# Patient Record
Sex: FEMALE | Race: WHITE | HISPANIC OR LATINO | Employment: FULL TIME | URBAN - METROPOLITAN AREA
[De-identification: names, ages, dates, MRNs, and addresses within clinical notes are randomized per-mention and may not be internally consistent; named-entity substitution may affect disease eponyms.]

---

## 2017-06-07 ENCOUNTER — TELEPHONE (OUTPATIENT)
Dept: FAMILY MEDICINE | Facility: CLINIC | Age: 62
End: 2017-06-07

## 2017-06-07 DIAGNOSIS — Z00.00 GENERAL MEDICAL EXAM: Primary | ICD-10-CM

## 2017-06-10 ENCOUNTER — LAB VISIT (OUTPATIENT)
Dept: LAB | Facility: HOSPITAL | Age: 62
End: 2017-06-10
Attending: FAMILY MEDICINE
Payer: COMMERCIAL

## 2017-06-10 DIAGNOSIS — Z00.00 GENERAL MEDICAL EXAM: ICD-10-CM

## 2017-06-10 LAB
ALBUMIN SERPL BCP-MCNC: 3.8 G/DL
ALP SERPL-CCNC: 108 U/L
ALT SERPL W/O P-5'-P-CCNC: 23 U/L
ANION GAP SERPL CALC-SCNC: 11 MMOL/L
AST SERPL-CCNC: 17 U/L
BASOPHILS # BLD AUTO: 0.02 K/UL
BASOPHILS NFR BLD: 0.3 %
BILIRUB SERPL-MCNC: 0.6 MG/DL
BUN SERPL-MCNC: 14 MG/DL
CALCIUM SERPL-MCNC: 9.9 MG/DL
CHLORIDE SERPL-SCNC: 103 MMOL/L
CHOLEST/HDLC SERPL: 5.7 {RATIO}
CO2 SERPL-SCNC: 26 MMOL/L
CREAT SERPL-MCNC: 0.7 MG/DL
DIFFERENTIAL METHOD: ABNORMAL
EOSINOPHIL # BLD AUTO: 0.1 K/UL
EOSINOPHIL NFR BLD: 0.9 %
ERYTHROCYTE [DISTWIDTH] IN BLOOD BY AUTOMATED COUNT: 14.1 %
EST. GFR  (AFRICAN AMERICAN): >60 ML/MIN/1.73 M^2
EST. GFR  (NON AFRICAN AMERICAN): >60 ML/MIN/1.73 M^2
GLUCOSE SERPL-MCNC: 99 MG/DL
HCT VFR BLD AUTO: 45.2 %
HDL/CHOLESTEROL RATIO: 17.5 %
HDLC SERPL-MCNC: 240 MG/DL
HDLC SERPL-MCNC: 42 MG/DL
HGB BLD-MCNC: 14.2 G/DL
LDLC SERPL CALC-MCNC: 150.6 MG/DL
LYMPHOCYTES # BLD AUTO: 2.7 K/UL
LYMPHOCYTES NFR BLD: 36.1 %
MCH RBC QN AUTO: 28 PG
MCHC RBC AUTO-ENTMCNC: 31.4 %
MCV RBC AUTO: 89 FL
MONOCYTES # BLD AUTO: 0.5 K/UL
MONOCYTES NFR BLD: 6.1 %
NEUTROPHILS # BLD AUTO: 4.2 K/UL
NEUTROPHILS NFR BLD: 56.3 %
NONHDLC SERPL-MCNC: 198 MG/DL
PLATELET # BLD AUTO: 195 K/UL
PMV BLD AUTO: 12.2 FL
POTASSIUM SERPL-SCNC: 4.4 MMOL/L
PROT SERPL-MCNC: 7.4 G/DL
RBC # BLD AUTO: 5.08 M/UL
SODIUM SERPL-SCNC: 140 MMOL/L
TRIGL SERPL-MCNC: 237 MG/DL
TSH SERPL DL<=0.005 MIU/L-ACNC: 2.65 UIU/ML
WBC # BLD AUTO: 7.51 K/UL

## 2017-06-10 PROCEDURE — 36415 COLL VENOUS BLD VENIPUNCTURE: CPT | Mod: PO

## 2017-06-10 PROCEDURE — 83036 HEMOGLOBIN GLYCOSYLATED A1C: CPT

## 2017-06-10 PROCEDURE — 85025 COMPLETE CBC W/AUTO DIFF WBC: CPT

## 2017-06-10 PROCEDURE — 80061 LIPID PANEL: CPT

## 2017-06-10 PROCEDURE — 84443 ASSAY THYROID STIM HORMONE: CPT

## 2017-06-10 PROCEDURE — 80053 COMPREHEN METABOLIC PANEL: CPT

## 2017-06-11 LAB
ESTIMATED AVG GLUCOSE: 126 MG/DL
HBA1C MFR BLD HPLC: 6 %

## 2017-06-12 ENCOUNTER — OFFICE VISIT (OUTPATIENT)
Dept: FAMILY MEDICINE | Facility: CLINIC | Age: 62
End: 2017-06-12
Payer: COMMERCIAL

## 2017-06-12 VITALS
SYSTOLIC BLOOD PRESSURE: 140 MMHG | WEIGHT: 216.5 LBS | HEIGHT: 63 IN | TEMPERATURE: 98 F | OXYGEN SATURATION: 97 % | DIASTOLIC BLOOD PRESSURE: 80 MMHG | HEART RATE: 81 BPM | BODY MASS INDEX: 38.36 KG/M2

## 2017-06-12 DIAGNOSIS — E78.5 HYPERLIPIDEMIA, UNSPECIFIED HYPERLIPIDEMIA TYPE: ICD-10-CM

## 2017-06-12 DIAGNOSIS — E66.01 SEVERE OBESITY (BMI 35.0-35.9 WITH COMORBIDITY): ICD-10-CM

## 2017-06-12 DIAGNOSIS — Z00.00 ANNUAL PHYSICAL EXAM: Primary | ICD-10-CM

## 2017-06-12 DIAGNOSIS — I10 ESSENTIAL HYPERTENSION: ICD-10-CM

## 2017-06-12 PROCEDURE — 99396 PREV VISIT EST AGE 40-64: CPT | Mod: S$GLB,,, | Performed by: FAMILY MEDICINE

## 2017-06-12 PROCEDURE — 99999 PR PBB SHADOW E&M-EST. PATIENT-LVL IV: CPT | Mod: PBBFAC,,, | Performed by: FAMILY MEDICINE

## 2017-06-12 NOTE — PATIENT INSTRUCTIONS
Aerobic Exercise for a Healthy Heart  Exercise is a lot more than an energy booster and a stress reliever. It also strengthens your heart muscle, lowers your blood pressure and cholesterol, and burns calories. It can also improve your resting muscle tone, and your mood.     Remember, some activity is better than none.    Choose an aerobic activity  Choose an activity that makes your heart and lungs work harder than they do when you rest or walk normally. This aerobic exercise can improve the way your heart and other muscles use oxygen. Make it fun by exercising with a friend and choosing an activity you enjoy. Here are some ideas:  · Walking  · Swimming  · Bicycling  · Stair climbing  · Dancing  · Jogging  · Gardening  Exercise regularly  If you havent been exercising regularly,  get your doctors OK first. Then start slowly.  Here are some tips:  · Begin exercising 3 times a week for 5 to 10 minutes at a time.  · When you feel comfortable, add a few minutes each session.  · Slowly build up to exercising 3 to 4 times each week. Each session should last for 40 minutes, on average, and involve moderate- to vigorous-intensity physical activity.  · If you have been given nitroglycerin, be sure to carry it when you exercise.  · If you get chest pain (angina) when youre exercising, stop what youre doing, take your nitroglycerin, and call your doctor.  Date Last Reviewed: 6/2/2016  © 9646-0075 [employer]. [address] [place] [address], [place] [address]. All rights reserved. This information is not intended as a substitute for professional medical care. Always follow your healthcare professional's instructions.

## 2017-06-12 NOTE — PROGRESS NOTES
Routine Office Visit    Patient Name: Keyona Herrera    : 1955  MRN: [id number]    Subjective:  Keyona is a 61 y.o. female who presents today for     1. Annual exam / establish care / new to me - pt states that she has been working on her diet since her health physical. Her cholesterol level has been high. She has been trying to exercise.     Review of Systems   Constitutional: Negative for chills and fever.   HENT: Negative for congestion.    Eyes: Negative for blurred vision.   Respiratory: Negative for cough.    Cardiovascular: Negative for chest pain.   Gastrointestinal: Negative for abdominal pain, constipation, diarrhea, heartburn, nausea and vomiting.   Genitourinary: Negative for dysuria.   Musculoskeletal: Negative for myalgias.   Skin: Negative for itching and rash.   Neurological: Negative for dizziness and headaches.   Psychiatric/Behavioral: Negative for depression.       Active Problem List  Patient Active Problem List   Diagnosis    Hyperlipidemia    Obesity    Dizziness       Past Surgical History  Past Surgical History:   Procedure Laterality Date     SECTION      CHOLECYSTECTOMY      COLONOSCOPY N/A 2016    Procedure: COLONOSCOPY;  Surgeon: Edy Chanel MD;  [facility];  Service: Endoscopy;  Laterality: N/A;    HYSTERECTOMY      INCISIONAL BREAST BIOPSY      surgery on foot         Family History  Family History   Problem Relation Age of Onset    Dementia Mother     Aneurysm Father     Hypertension      Cancer Maternal Grandfather        Social History  Social History     Social History    Marital status:      Spouse name: N/A    Number of children: N/A    Years of education: N/A     Occupational History    Not on file.     Social History Main Topics    Smoking status: Never Smoker    Smokeless tobacco: Never Used    Alcohol use Yes      Comment: socail drinker/ not often    Drug use: No    Sexual activity: Yes     Partners: Male  "    Other Topics Concern    Not on file     Social History Narrative    No narrative on file       Medications and Allergies  Reviewed and updated.   Current Outpatient Prescriptions   Medication Sig    coenzyme Q10 100 mg capsule Take 100 mg by mouth once daily.    flaxseed oil 1,000 mg Cap Take by mouth. 1 Capsule Oral Every day    meclizine (ANTIVERT) 25 mg tablet Take 1 tablet (25 mg total) by mouth 3 (three) times daily as needed for Dizziness.    multivitamin capsule Take by mouth. 1 Capsule Oral Every day    omega-3 fatty acids 1,000 mg Cap Take by mouth. 1 Capsule Oral Every day    polyethylene glycol (COLYTE) 240-22.72-6.72 -5.84 gram SolR Take 4,000 mLs (4 L total) by mouth as directed.    valacyclovir (VALTREX) 1000 MG tablet      No current facility-administered medications for this visit.        Physical Exam  BP (!) 140/80 (BP Location: Right arm, Patient Position: Sitting, BP Method: Manual)   Pulse 81   Temp 98 °F (36.7 °C) (Oral)   Ht 5' 3" (1.6 m)   Wt 98.2 kg (216 lb 7.9 oz)   SpO2 97%   BMI 38.35 kg/m²   Physical Exam   Constitutional: She is oriented to person, place, and time. She appears well-developed and well-nourished.   HENT:   Head: Normocephalic and atraumatic.   Eyes: Conjunctivae and EOM are normal. Pupils are equal, round, and reactive to light.   Neck: Normal range of motion. Neck supple.   Cardiovascular: Normal rate, regular rhythm and normal heart sounds.  Exam reveals no gallop and no friction rub.    No murmur heard.  Pulmonary/Chest: Breath sounds normal. No respiratory distress.   Abdominal: Soft. Bowel sounds are normal. She exhibits no distension. There is no tenderness.   Musculoskeletal: Normal range of motion.   Lymphadenopathy:     She has no cervical adenopathy.   Neurological: She is alert and oriented to person, place, and time.   Skin: Skin is warm.   Psychiatric: She has a normal mood and affect.         Assessment/Plan:  Keyona Herrera is a 61 " y.o. female who presents today for :    Annual physical exam  I personally reviewed patient's labs with patient   Pt with hyperlipidemia and prediabetes     Hyperlipidemia, unspecified hyperlipidemia type  -     Ambulatory Referral to [facility]     Essential hypertension  -     Ambulatory Referral to [facility]  The current medical regimen is effective;  continue present plan and medications.    Severe obesity (BMI 35.0-35.9 with comorbidity)  The patient is asked to make an attempt to improve diet and exercise patterns to aid in medical management of this problem.   Advise she likely has metabolic syndrome   Recommend weight loss and lifestyle modification   Diet and exercise planning discussed.  Decrease portion control and carbohydrate consumption.  Recommend 30 minutes of moderate intensity exercise 5 days/week.  Recommend calorie counting with [employer] and exercise videos on youtube:[employer].          Return in about 6 months (around 12/12/2017).

## 2017-06-26 ENCOUNTER — HOSPITAL ENCOUNTER (EMERGENCY)
Facility: OTHER | Age: 62
Discharge: HOME | End: 2017-06-26
Attending: EMERGENCY MEDICINE
Payer: COMMERCIAL

## 2017-06-26 VITALS
SYSTOLIC BLOOD PRESSURE: 144 MMHG | HEIGHT: 63 IN | BODY MASS INDEX: 38.27 KG/M2 | OXYGEN SATURATION: 98 % | HEART RATE: 75 BPM | RESPIRATION RATE: 18 BRPM | WEIGHT: 216 LBS | DIASTOLIC BLOOD PRESSURE: 84 MMHG | TEMPERATURE: 99 F

## 2017-06-26 DIAGNOSIS — R51.9 HEADACHE: Primary | ICD-10-CM

## 2017-06-26 DIAGNOSIS — H53.9 VISUAL DISTURBANCE: ICD-10-CM

## 2017-06-26 LAB — POCT GLUCOSE: 101 MG/DL (ref 70–110)

## 2017-06-26 PROCEDURE — 99284 EMERGENCY DEPT VISIT MOD MDM: CPT

## 2017-06-26 NOTE — ED TRIAGE NOTES
To ed with c/o headache/blurred vision. Rates headache 1/10 at present. Reports blurred vision resolved. Denies dizziness. Speech clear, no facial droop noted. States pcp told her she was borderline htn and diabetes, states no prescribed meds, wants her to control with diet. Reports hx vertigo, last antivert use ~ 3 months

## 2017-06-26 NOTE — ED PROVIDER NOTES
Encounter Date: 2017       History     Chief Complaint   Patient presents with    Headache     + HA  since 1 pm today patient reports she ibuprofen with no relief patient reports she ws told x 1 week ago she needed to watch her blood sugar because she was boardline diabetic + blurred vision Neg CP Neg SOB    Dizziness     + lightheadness since improved from 1 pm. patient gait steady upon walking to the room     Chief complaint: Headache  This is 61-year-old lady who began developing a headache shortly before 1 PM this afternoon.  Patient said her headache was initially 7 out of 10 but now is almost gone after taking aspirin and ibuprofen.  Headache is located to the back of her head.  She is unable to describe the pain.  She was concerned because she had blurry vision to her left eye for about 45 minutes.  This has resolved.  She denied eye pain, photophobia, fever or neck stiffness.  Patient also reported a sore throat.  She denies difficulty walking or speaking.          Review of patient's allergies indicates:  No Known Allergies  Past Medical History:   Diagnosis Date    HTN (hypertension)     Hyperlipidemia     Obesity      Past Surgical History:   Procedure Laterality Date     SECTION      CHOLECYSTECTOMY      COLONOSCOPY N/A 2016    Procedure: COLONOSCOPY;  Surgeon: Edy Chanel MD;  Location: [facility];  Service: Endoscopy;  Laterality: N/A;    HYSTERECTOMY      INCISIONAL BREAST BIOPSY      surgery on foot       Family History   Problem Relation Age of Onset    Dementia Mother     Aneurysm Father     Hypertension      Cancer Maternal Grandfather      Social History   Substance Use Topics    Smoking status: Never Smoker    Smokeless tobacco: Never Used    Alcohol use Yes      Comment: socail drinker/ not often     Review of Systems   Constitutional: Negative for fever.   HENT: Negative for sore throat.    Eyes: Positive for visual disturbance.   Respiratory: Negative for  shortness of breath.    Cardiovascular: Negative for chest pain.   Gastrointestinal: Negative for nausea.   Genitourinary: Negative for dysuria.   Musculoskeletal: Negative for back pain.   Skin: Negative for rash.   Neurological: Positive for headaches. Negative for weakness.   Hematological: Does not bruise/bleed easily.       Physical Exam     Initial Vitals [06/26/17 1635]   BP Pulse Resp Temp SpO2   (!) 144/84 75 18 99 °F (37.2 °C) 98 %      MAP       104         Physical Exam    Nursing note and vitals reviewed.  Constitutional: She appears well-developed and well-nourished.   HENT:   Head: Normocephalic and atraumatic.   Eyes: Conjunctivae and EOM are normal. Pupils are equal, round, and reactive to light.   Neck: Normal range of motion. Neck supple.   Cardiovascular: Normal rate and regular rhythm.   Pulmonary/Chest: Breath sounds normal.   Abdominal: Soft. There is no tenderness. There is no rebound and no guarding.   Musculoskeletal: Normal range of motion. She exhibits no edema or tenderness.   Neurological: She is alert and oriented to person, place, and time. She has normal strength. No cranial nerve deficit or sensory deficit.   Normal gait, good finger to nose   Skin: Skin is warm and dry.   Psychiatric: She has a normal mood and affect.         ED Course   Procedures  Labs Reviewed   POCT GLUCOSE             Medical Decision Making:   Initial Assessment:   61-year-old lady who complains of a headache and blurry vision.  Patient's blurry vision has resolved.  Her headache is almost gone.  Patient denies other symptoms.  She has no neurological deficits.  ED Management:  Secondary to the location of the headache and the blurry vision CT of the head will be done.  CT of the head shows no significant abnormalities.  Patient says she has to follow-up with her eye doctor.  She was also referred back to her primary care physician                   ED Course     Clinical Impression:   The primary encounter  diagnosis was Headache. A diagnosis of Visual disturbance was also pertinent to this visit.                           Aliza Sagastume MD  06/26/17 4990

## 2017-07-06 ENCOUNTER — OFFICE VISIT (OUTPATIENT)
Dept: FAMILY MEDICINE | Facility: CLINIC | Age: 62
End: 2017-07-06
Payer: COMMERCIAL

## 2017-07-06 VITALS
DIASTOLIC BLOOD PRESSURE: 68 MMHG | BODY MASS INDEX: 38.47 KG/M2 | SYSTOLIC BLOOD PRESSURE: 122 MMHG | WEIGHT: 217.13 LBS | OXYGEN SATURATION: 96 % | HEART RATE: 83 BPM | TEMPERATURE: 98 F | HEIGHT: 63 IN

## 2017-07-06 DIAGNOSIS — H53.8 BLURRY VISION: Primary | ICD-10-CM

## 2017-07-06 PROCEDURE — 99213 OFFICE O/P EST LOW 20 MIN: CPT | Mod: S$GLB,,, | Performed by: NURSE PRACTITIONER

## 2017-07-06 PROCEDURE — 99999 PR PBB SHADOW E&M-EST. PATIENT-LVL IV: CPT | Mod: PBBFAC,,, | Performed by: NURSE PRACTITIONER

## 2017-07-06 NOTE — PROGRESS NOTES
This dictation has been generated using Dragon Dictation some phonetic errors may occur.     Keyona was seen today for blurred vision.    Diagnoses and all orders for this visit:    Blurry vision     blurry vision unilateral.  Follow-up with ophthalmology.  CT at urgent care/ER was negative.  No focal abnormalities noted.  Call us if ophthalmology states no problem.  We can consider further testing.      Return if symptoms worsen or fail to improve.      ________________________________________________________________  ________________________________________________________________        Chief Complaint   Patient presents with    Blurred Vision     History of present illness  This 61 y.o. presents today for complaint of left-sided blurry vision.  Patient had similar problem on Monday.  She notes that it was central and left lateral.  It started on  at 1 PM.  She sought care later that day however her symptoms had resolved about 50 minutes after onset.  A CT of the head.  Yesterday at work she had another episode which lasted about 25 minutes.  It was more left lateral blurry vision.  She noticed some eye irritation.  She denies any drainage.  She doesn't have any double vision.  She does note some floaters described as circles in the left eye.  She had some headache symptoms otherwise no other associated symptoms.  Review of systems  No fever or chills  No focal neurologic changes.  No speech changes.  No unilateral weakness or numbness.  No speech changes.  No word aphasia.  Next    Past medical and social history reviewed.  Nonsmoker    Father with history of aneurysm.      Past Medical History:   Diagnosis Date    HTN (hypertension)     Hyperlipidemia     Obesity        Past Surgical History:   Procedure Laterality Date     SECTION      CHOLECYSTECTOMY      COLONOSCOPY N/A 2016    Procedure: COLONOSCOPY;  Surgeon: Edy Chanel MD;  Location: [facility];  Service: Endoscopy;   Laterality: N/A;    HYSTERECTOMY      INCISIONAL BREAST BIOPSY      surgery on foot         Family History   Problem Relation Age of Onset    Dementia Mother     Aneurysm Father     Hypertension      Cancer Maternal Grandfather        Social History     Social History    Marital status:      Spouse name: N/A    Number of children: N/A    Years of education: N/A     Social History Main Topics    Smoking status: Never Smoker    Smokeless tobacco: Never Used    Alcohol use Yes      Comment: socail drinker/ not often    Drug use: No    Sexual activity: Yes     Partners: Male     Other Topics Concern    None     Social History Narrative    None       Current Outpatient Prescriptions   Medication Sig Dispense Refill    coenzyme Q10 100 mg capsule Take 100 mg by mouth once daily.      flaxseed oil 1,000 mg Cap Take by mouth. 1 Capsule Oral Every day      meclizine (ANTIVERT) 25 mg tablet Take 1 tablet (25 mg total) by mouth 3 (three) times daily as needed for Dizziness. 60 tablet 1    multivitamin capsule Take by mouth. 1 Capsule Oral Every day      omega-3 fatty acids 1,000 mg Cap Take by mouth. 1 Capsule Oral Every day      polyethylene glycol (COLYTE) 240-22.72-6.72 -5.84 gram SolR Take 4,000 mLs (4 L total) by mouth as directed. 1 Bottle 0    valacyclovir (VALTREX) 1000 MG tablet   2     No current facility-administered medications for this visit.        Review of patient's allergies indicates:  No Known Allergies    Physical examination  Vitals Reviewed  Gen. Well-dressed well-nourished no apparent distress  Skin warm dry and intact.  No rashes noted.  HEENT.  TM intact bilateral with normal light reflex.  No mastoid tenderness during percussion.  Nares patent bilateral.  Pharynx is unremarkable.  No maxillary or frontal sinus tenderness when percussed.    Neck is supple without adenopathy  Chest.  Respirations are even unlabored.  Lungs are clear to auscultation.  Cardiac regular rate  and rhythm.  No chest wall adenopathy noted.  Neuro. Awake alert oriented x4.  Normal judgment and cognition noted.  Cranial nerves II through XII grossly intact.    Extremities no clubbing cyanosis or edema noted.     Call or return to clinic prn if these symptoms worsen or fail to improve as anticipated.

## 2017-12-18 ENCOUNTER — OFFICE VISIT (OUTPATIENT)
Dept: FAMILY MEDICINE | Facility: CLINIC | Age: 62
End: 2017-12-18
Payer: COMMERCIAL

## 2017-12-18 VITALS
SYSTOLIC BLOOD PRESSURE: 134 MMHG | DIASTOLIC BLOOD PRESSURE: 90 MMHG | HEIGHT: 63 IN | HEART RATE: 90 BPM | BODY MASS INDEX: 38.83 KG/M2 | TEMPERATURE: 99 F | OXYGEN SATURATION: 96 % | WEIGHT: 219.13 LBS

## 2017-12-18 DIAGNOSIS — J06.9 UPPER RESPIRATORY TRACT INFECTION, UNSPECIFIED TYPE: ICD-10-CM

## 2017-12-18 DIAGNOSIS — J10.1 INFLUENZA B: Primary | ICD-10-CM

## 2017-12-18 DIAGNOSIS — M79.10 MYALGIA: ICD-10-CM

## 2017-12-18 DIAGNOSIS — R50.9 FEVER AND CHILLS: ICD-10-CM

## 2017-12-18 LAB
CTP QC/QA: YES
FLUAV AG NPH QL: NEGATIVE
FLUBV AG NPH QL: POSITIVE

## 2017-12-18 PROCEDURE — 87804 INFLUENZA ASSAY W/OPTIC: CPT | Mod: QW,S$GLB,, | Performed by: NURSE PRACTITIONER

## 2017-12-18 PROCEDURE — 99999 PR PBB SHADOW E&M-EST. PATIENT-LVL IV: CPT | Mod: PBBFAC,,, | Performed by: NURSE PRACTITIONER

## 2017-12-18 PROCEDURE — 99214 OFFICE O/P EST MOD 30 MIN: CPT | Mod: S$GLB,,, | Performed by: NURSE PRACTITIONER

## 2017-12-18 RX ORDER — OSELTAMIVIR PHOSPHATE 75 MG/1
75 CAPSULE ORAL 2 TIMES DAILY
Qty: 10 CAPSULE | Refills: 0 | Status: SHIPPED | OUTPATIENT
Start: 2017-12-18 | End: 2017-12-23

## 2017-12-18 RX ORDER — CODEINE PHOSPHATE AND GUAIFENESIN 10; 100 MG/5ML; MG/5ML
5 SOLUTION ORAL 3 TIMES DAILY PRN
Qty: 118 ML | Refills: 0 | Status: SHIPPED | OUTPATIENT
Start: 2017-12-18 | End: 2017-12-28

## 2017-12-18 NOTE — LETTER
December 18, 2017      Keyona Herrera   [address] [place] [address]             [facility]  [address] [facility]  [place] [address]  Phone: [phone]  Fax: [phone] Keyona Herrera    Was treated here on 12/18/2017    May Return to work/school on 12/21/2017    No Restrictions            Wicho Acharya NP

## 2017-12-18 NOTE — PROGRESS NOTES
Subjective:       Patient ID: Keyona Herrera is a 62 y.o. female.    Chief Complaint: Cough; Chills; and Generalized Body Aches    Cough   This is a new problem. The current episode started 1 to 4 weeks ago. The problem has been gradually worsening. The problem occurs every few minutes. The cough is non-productive. Associated symptoms include chills, a fever (subjective), headaches, myalgias, nasal congestion, postnasal drip, rhinorrhea and a sore throat. Pertinent negatives include no chest pain, ear congestion, ear pain, heartburn, hemoptysis, rash, shortness of breath, sweats, weight loss or wheezing. Nothing aggravates the symptoms.     Review of Systems   Constitutional: Positive for chills, fatigue and fever (subjective). Negative for activity change, appetite change and weight loss.   HENT: Positive for congestion, postnasal drip, rhinorrhea, sneezing and sore throat. Negative for ear pain, sinus pain, sinus pressure and voice change.    Respiratory: Positive for cough. Negative for hemoptysis, chest tightness, shortness of breath and wheezing.    Cardiovascular: Negative for chest pain.   Gastrointestinal: Negative for heartburn, nausea and vomiting.   Musculoskeletal: Positive for back pain and myalgias. Negative for arthralgias.   Skin: Negative for rash.   Neurological: Positive for headaches. Negative for dizziness, weakness and light-headedness.       Objective:      Physical Exam   Constitutional: She is oriented to person, place, and time. She appears well-developed and well-nourished. She has a sickly appearance. No distress.   HENT:   Head: Normocephalic and atraumatic.   Right Ear: Tympanic membrane, external ear and ear canal normal.   Left Ear: Tympanic membrane, external ear and ear canal normal.   Nose: Mucosal edema present.   Mouth/Throat: Oropharynx is clear and moist. Mucous membranes are not dry. No oropharyngeal exudate, posterior oropharyngeal edema or posterior oropharyngeal  erythema.   Eyes: Conjunctivae and EOM are normal. Pupils are equal, round, and reactive to light.   Cardiovascular: Normal rate, regular rhythm and normal heart sounds.    No murmur heard.  Pulmonary/Chest: Effort normal and breath sounds normal. She has no wheezes. She has no rales.   Lymphadenopathy:     She has no cervical adenopathy.   Neurological: She is alert and oriented to person, place, and time.   Skin: Skin is warm and dry. Capillary refill takes less than 2 seconds.   Psychiatric: She has a normal mood and affect.   Nursing note and vitals reviewed.      Assessment:       1. Influenza B    2. Upper respiratory tract infection, unspecified type    3. Fever and chills    4. Myalgia        Plan:       Keyona was seen today for cough, chills and generalized body aches.    Diagnoses and all orders for this visit:    Influenza B  -     oseltamivir (TAMIFLU) 75 MG capsule; Take 1 capsule (75 mg total) by mouth 2 (two) times daily.  -     guaifenesin-codeine 100-10 mg/5 ml (TUSSI-ORGANIDIN NR)  mg/5 mL syrup; Take 5 mLs by mouth 3 (three) times daily as needed for Cough.    Upper respiratory tract infection, unspecified type  -     guaifenesin-codeine 100-10 mg/5 ml (TUSSI-ORGANIDIN NR)  mg/5 mL syrup; Take 5 mLs by mouth 3 (three) times daily as needed for Cough.    Fever and chills  -     POCT Influenza A/B    Myalgia  -     POCT Influenza A/B    juice your temperature several times a day. If your fever is 100.4°F for more than a day, call your doctor.   Relax, lie down. Go to bed if you want. Just get off your feet and rest. Also, drink plenty of fluids to avoid dehydration.   Tylenol 500 mg or ibuprofen 200 mg 2 tabs every 6 hours as needed for fever and pain  Breathe steam to open blocked nasal passages.  a hot shower or use a vaporizer. Be careful not to get burned by the steam.   Saline nasal sprays and decongestant tablets help open a stuffy nose. Dry up a runny nose or postnasal  drip with antihistamines. Recommend Claritin D daily   Gargle every 2 hours with 1/4 teaspoon of salt dissolved in 1/2 cup of warm water. Suck on throat lozenges and cough drops to moisten your throat.   If coughing is a problem, take an expectorant to loosen mucus. If you have a dry cough, use a cough suppressant. Recommend Delsym.   Put fluid back into your body. Take frequent sips of clear liquids such as water or broth. Do not drink beverages with a lot of sugar in them, such as juices and sodas. These can make diarrhea worse. Also, do not drink sport drinks, such as Gatorade, which dont have the right mix of water, sugar, and minerals, and can make the symptoms worse.   As your appetite returns, you can resume your normal diet. Ask your doctor whether there are any foods you should avoid.     When to Call Your Doctor   When you first notice symptoms, ask your healthcare provider about antiviral medication. If taken soon after flu symptoms start, this can help you get well sooner. (Antibiotics should not be taken for colds or flu.) Also, call your doctor if you have any of the following symptoms or if you arent feeling better after 7 days:   Shortness of breath   Pain or pressure in the chest or abdomen   Worsening symptoms, especially after a period of improvement   Fever of 100.4°F or higher, or fever that doesnt go down with medication   Sudden dizziness or confusion   Severe or continued vomiting   Signs of dehydration, including extreme thirst, dark urine, infrequent urination, dry mouth   Spotted, red, or very sore throat    © 1758-6712 [employer], [address] [place] [address], [place] [address]. All rights reserved. This information is not intended as a substitute for professional medical care. Always follow your healthcare professional's instructions.

## 2017-12-18 NOTE — PATIENT INSTRUCTIONS

## 2017-12-29 ENCOUNTER — OFFICE VISIT (OUTPATIENT)
Dept: FAMILY MEDICINE | Facility: CLINIC | Age: 62
End: 2017-12-29
Payer: COMMERCIAL

## 2017-12-29 ENCOUNTER — HOSPITAL ENCOUNTER (OUTPATIENT)
Dept: RADIOLOGY | Facility: HOSPITAL | Age: 62
Discharge: HOME | End: 2017-12-29
Attending: NURSE PRACTITIONER
Payer: COMMERCIAL

## 2017-12-29 VITALS
BODY MASS INDEX: 38.67 KG/M2 | HEIGHT: 63 IN | DIASTOLIC BLOOD PRESSURE: 80 MMHG | SYSTOLIC BLOOD PRESSURE: 140 MMHG | WEIGHT: 218.25 LBS | TEMPERATURE: 99 F | OXYGEN SATURATION: 97 % | HEART RATE: 83 BPM

## 2017-12-29 DIAGNOSIS — R10.9 RIGHT FLANK PAIN: ICD-10-CM

## 2017-12-29 DIAGNOSIS — N39.0 URINARY TRACT INFECTION WITH HEMATURIA, SITE UNSPECIFIED: ICD-10-CM

## 2017-12-29 DIAGNOSIS — R10.30 LOWER ABDOMINAL PAIN: Primary | ICD-10-CM

## 2017-12-29 DIAGNOSIS — R10.31 ABDOMINAL PAIN, RLQ: ICD-10-CM

## 2017-12-29 DIAGNOSIS — R10.30 LOWER ABDOMINAL PAIN: ICD-10-CM

## 2017-12-29 DIAGNOSIS — R31.9 URINARY TRACT INFECTION WITH HEMATURIA, SITE UNSPECIFIED: ICD-10-CM

## 2017-12-29 DIAGNOSIS — M54.50 ACUTE RIGHT-SIDED LOW BACK PAIN WITHOUT SCIATICA: ICD-10-CM

## 2017-12-29 LAB
BILIRUB SERPL-MCNC: NEGATIVE MG/DL
BLOOD URINE, POC: 250
COLOR, POC UA: YELLOW
GLUCOSE UR QL STRIP: NEGATIVE
KETONES UR QL STRIP: NEGATIVE
LEUKOCYTE ESTERASE URINE, POC: NEGATIVE
NITRITE, POC UA: NEGATIVE
PH, POC UA: 5
PROTEIN, POC: NORMAL
SPECIFIC GRAVITY, POC UA: 1.02
UROBILINOGEN, POC UA: NEGATIVE

## 2017-12-29 PROCEDURE — 99214 OFFICE O/P EST MOD 30 MIN: CPT | Mod: 25,S$GLB,, | Performed by: NURSE PRACTITIONER

## 2017-12-29 PROCEDURE — 74177 CT ABD & PELVIS W/CONTRAST: CPT | Mod: 26,,, | Performed by: RADIOLOGY

## 2017-12-29 PROCEDURE — 96372 THER/PROPH/DIAG INJ SC/IM: CPT | Mod: S$GLB,,, | Performed by: INTERNAL MEDICINE

## 2017-12-29 PROCEDURE — 25500020 PHARM REV CODE 255: Performed by: NURSE PRACTITIONER

## 2017-12-29 PROCEDURE — 87086 URINE CULTURE/COLONY COUNT: CPT

## 2017-12-29 PROCEDURE — 87077 CULTURE AEROBIC IDENTIFY: CPT

## 2017-12-29 PROCEDURE — 74177 CT ABD & PELVIS W/CONTRAST: CPT | Mod: TC

## 2017-12-29 PROCEDURE — 81002 URINALYSIS NONAUTO W/O SCOPE: CPT | Mod: S$GLB,,, | Performed by: NURSE PRACTITIONER

## 2017-12-29 PROCEDURE — 87186 SC STD MICRODIL/AGAR DIL: CPT

## 2017-12-29 PROCEDURE — 99999 PR PBB SHADOW E&M-EST. PATIENT-LVL V: CPT | Mod: PBBFAC,,, | Performed by: NURSE PRACTITIONER

## 2017-12-29 PROCEDURE — 87088 URINE BACTERIA CULTURE: CPT

## 2017-12-29 RX ORDER — KETOROLAC TROMETHAMINE 30 MG/ML
60 INJECTION, SOLUTION INTRAMUSCULAR; INTRAVENOUS
Status: COMPLETED | OUTPATIENT
Start: 2017-12-29 | End: 2017-12-29

## 2017-12-29 RX ORDER — IBUPROFEN 600 MG/1
600 TABLET ORAL 3 TIMES DAILY
Qty: 30 TABLET | Refills: 0 | Status: SHIPPED | OUTPATIENT
Start: 2017-12-29 | End: 2018-11-29 | Stop reason: SDUPTHER

## 2017-12-29 RX ORDER — CIPROFLOXACIN 250 MG/1
250 TABLET, FILM COATED ORAL 2 TIMES DAILY
Qty: 6 TABLET | Refills: 0 | Status: SHIPPED | OUTPATIENT
Start: 2017-12-29 | End: 2018-01-01

## 2017-12-29 RX ORDER — CYCLOBENZAPRINE HCL 10 MG
10 TABLET ORAL 3 TIMES DAILY PRN
Qty: 30 TABLET | Refills: 0 | Status: SHIPPED | OUTPATIENT
Start: 2017-12-29 | End: 2018-01-28

## 2017-12-29 RX ADMIN — IOHEXOL 100 ML: 350 INJECTION, SOLUTION INTRAVENOUS at 04:12

## 2017-12-29 RX ADMIN — IOHEXOL 15 ML: 300 INJECTION, SOLUTION INTRAVENOUS at 04:12

## 2017-12-29 RX ADMIN — KETOROLAC TROMETHAMINE 60 MG: 30 INJECTION, SOLUTION INTRAMUSCULAR; INTRAVENOUS at 11:12

## 2017-12-29 NOTE — PATIENT INSTRUCTIONS
Abdominal Pain    Abdominal pain is pain in the stomach or belly area. Everyone has this pain from time to time. In many cases it goes away on its own. But abdominal pain can sometimes be due to a serious problem, such as appendicitis. So its important to know when to seek help.  Causes of abdominal pain  There are many possible causes of abdominal pain. Common causes in adults include:  · Constipation, diarrhea, or gas  · Stomach acid flowing back up into the esophagus (acid reflux or heartburn)  · Severe acid reflux, called GERD (gastroesophageal reflux disease)  · A sore in the lining of the stomach or small intestine (peptic ulcer)  · Inflammation of the gallbladder, liver, or pancreas  · Gallstones or kidney stones  · Appendicitis   · Intestinal blockage   · An internal organ pushing through a muscle or other tissue (hernia)  · Urinary tract infections  · In women, menstrual cramps, fibroids, or endometriosis  · Inflammation or infection of the intestines  Diagnosing the cause of abdominal pain  Your healthcare provider will do a physical exam help find the cause of your pain. If needed, tests will be ordered. Belly pain has many possible causes. So it can be hard to find the reason for your pain. Giving details about your pain can help. Tell your provider where and when you feel the pain, and what makes it better or worse. Also let your provider know if you have other symptoms such as:  · Fever  · Tiredness  · Upset stomach (nausea)  · Vomiting  · Changes in bathroom habits  Treating abdominal pain  Some causes of pain need emergency medical treatment right away. These include appendicitis or a bowel blockage. Other problems can be treated with rest, fluids, or medicines. Your healthcare provider can give you specific instructions for treatment or self-care based on what is causing your pain.  If you have vomiting or diarrhea, sip water or other clear fluids. When you are ready to eat solid foods again,  start with small amounts of easy-to-digest, low-fat foods. These include apple sauce, toast, or crackers.   When to seek medical care  Call 911 or go to the hospital right away if you:  · Cant pass stool and are vomiting  · Are vomiting blood or have bloody diarrhea or black, tarry diarrhea  · Have chest, neck, or shoulder pain  · Feel like you might pass out  · Have pain in your shoulder blades with nausea  · Have sudden, severe belly pain  · Have new, severe pain unlike any you have felt before  · Have a belly that is rigid, hard, and tender to touch  Call your healthcare provider if you have:  · Pain for more than 5 days  · Bloating for more than 2 days  · Diarrhea for more than 5 days  · A fever of 100.4°F (38.0°C) or higher, or as directed by your provider  · Pain that gets worse  · Weight loss for no reason  · Continued lack of appetite  · Blood in your stool  How to prevent abdominal pain  Here are some tips to help prevent abdominal pain:  · Eat smaller amounts of food at one time.  · Avoid greasy, fried, or other high-fat foods.  · Avoid foods that give you gas.  · Exercise regularly.  · Drink plenty of fluids.  To help prevent GERD symptoms:  · Quit smoking.  · Reduce alcohol and certain foods that increase stomach acid.  · Avoid aspirin and over-the-counter pain and fever medicines (NSAIDS or nonsteroidal anti-inflammatory drugs), if possible  · Lose extra weight.  · Finish eating at least 2 hours before you go to bed or lie down.  · Raise the head of your bed.  Date Last Reviewed: 7/1/2016  © 4782-1297 [employer]. [address] [place] [address], [place] [address]. All rights reserved. This information is not intended as a substitute for professional medical care. Always follow your healthcare professional's instructions.

## 2017-12-29 NOTE — PROGRESS NOTES
"Subjective:       Patient ID: Keyona Herrera is a 62 y.o. female.    Chief Complaint: Abdominal Pain (lower abdominal pain that moves towards her back)    Abdominal Pain   This is a new problem. The current episode started in the past 7 days. The problem occurs constantly. The problem has been unchanged. The pain is located in the suprapubic region and RLQ. The pain is at a severity of 6/10. The pain is moderate. The abdominal pain radiates to the back and right flank. Pertinent negatives include no anorexia, arthralgias, belching, constipation, diarrhea, dysuria, fever, flatus, frequency, headaches, hematochezia, hematuria, melena, myalgias, nausea, vomiting or weight loss. Associated symptoms comments: "urine smell strong"   . The pain is aggravated by certain positions and movement. The pain is relieved by sitting up. Treatments tried: advil. The treatment provided mild relief.     Review of Systems   Constitutional: Negative for fever and weight loss.   Gastrointestinal: Positive for abdominal pain. Negative for anorexia, constipation, diarrhea, flatus, hematochezia, melena, nausea and vomiting.   Genitourinary: Negative for dysuria, frequency and hematuria.   Musculoskeletal: Negative for arthralgias and myalgias.   Neurological: Negative for headaches.       Objective:      Physical Exam   Constitutional: She is oriented to person, place, and time. Vital signs are normal. She appears well-developed and well-nourished.   HENT:   Head: Normocephalic and atraumatic.   Right Ear: External ear normal.   Left Ear: External ear normal.   Nose: Nose normal.   Mouth/Throat: Oropharynx is clear and moist. No oropharyngeal exudate.   Cardiovascular: Normal rate, regular rhythm and normal heart sounds.    Pulmonary/Chest: Effort normal and breath sounds normal.   Abdominal: Soft. Bowel sounds are normal. There is no hepatosplenomegaly, splenomegaly or hepatomegaly. There is tenderness in the right lower quadrant and " suprapubic area. There is no rigidity, no rebound, no guarding, no CVA tenderness, no tenderness at McBurney's point and negative No's sign.   Neurological: She is alert and oriented to person, place, and time.   Skin: Skin is warm, dry and intact. Capillary refill takes less than 2 seconds.   Psychiatric: She has a normal mood and affect.       Assessment:       1. Lower abdominal pain    2. Urinary tract infection with hematuria, site unspecified    3. Acute right-sided low back pain without sciatica    4. Right flank pain    5. Abdominal pain, RLQ        Plan:       Keyona was seen today for abdominal pain.    Diagnoses and all orders for this visit:    Lower abdominal pain  -     POCT URINE DIPSTICK WITHOUT MICROSCOPE  -     Urine culture  -     CT Abdomen Pelvis With Contrast; Future    Urinary tract infection with hematuria, site unspecified  -     ciprofloxacin HCl (CIPRO) 250 MG tablet; Take 1 tablet (250 mg total) by mouth 2 (two) times daily.    Acute right-sided low back pain without sciatica  -     cyclobenzaprine (FLEXERIL) 10 MG tablet; Take 1 tablet (10 mg total) by mouth 3 (three) times daily as needed.  -     ibuprofen (ADVIL,MOTRIN) 600 MG tablet; Take 1 tablet (600 mg total) by mouth 3 (three) times daily.  -     ketorolac injection 60 mg; Inject 2 mLs (60 mg total) into the muscle one time.    Right flank pain  -     CT Abdomen Pelvis With Contrast; Future  -     Comprehensive metabolic panel; Future    Abdominal pain, RLQ  -     CT Abdomen Pelvis With Contrast; Future  -     Comprehensive metabolic panel; Future      Patient discharged to home in stable condition with instructions to:   1. Please take all meds as prescribed.  2. Follow-up with your primary care doctor   3. Return precautions discussed and patient and/or family/caretaker understands to return to the emergency room for any concerns including worsening of your current symptoms, fever, chills, night sweats, worsening pain,  chest pain, shortness of breath, nausea, vomiting, diarrhea, bleeding, headache, difficulty talking, visual disturbances, weakness, numbness or any other acute concerns

## 2018-01-02 ENCOUNTER — TELEPHONE (OUTPATIENT)
Dept: FAMILY MEDICINE | Facility: CLINIC | Age: 63
End: 2018-01-02

## 2018-01-02 LAB — BACTERIA UR CULT: NORMAL

## 2018-01-02 NOTE — TELEPHONE ENCOUNTER
----- Message from Abiola Hernandez sent at 1/2/2018  9:58 AM CST -----  Contact: Self  Pt requesting results. Please call [phone].

## 2018-08-11 ENCOUNTER — HOSPITAL ENCOUNTER (OUTPATIENT)
Dept: RADIOLOGY | Facility: HOSPITAL | Age: 63
Discharge: HOME | End: 2018-08-11
Attending: NURSE PRACTITIONER
Payer: COMMERCIAL

## 2018-08-11 ENCOUNTER — OFFICE VISIT (OUTPATIENT)
Dept: FAMILY MEDICINE | Facility: CLINIC | Age: 63
End: 2018-08-11
Payer: COMMERCIAL

## 2018-08-11 VITALS
WEIGHT: 218.25 LBS | BODY MASS INDEX: 38.67 KG/M2 | HEART RATE: 79 BPM | TEMPERATURE: 98 F | OXYGEN SATURATION: 97 % | DIASTOLIC BLOOD PRESSURE: 88 MMHG | HEIGHT: 63 IN | SYSTOLIC BLOOD PRESSURE: 147 MMHG

## 2018-08-11 DIAGNOSIS — M25.562 ACUTE PAIN OF LEFT KNEE: Primary | ICD-10-CM

## 2018-08-11 DIAGNOSIS — M25.562 ACUTE PAIN OF LEFT KNEE: ICD-10-CM

## 2018-08-11 PROCEDURE — 96372 THER/PROPH/DIAG INJ SC/IM: CPT | Mod: S$GLB,,, | Performed by: NURSE PRACTITIONER

## 2018-08-11 PROCEDURE — 73562 X-RAY EXAM OF KNEE 3: CPT | Mod: TC,FY,PO,LT

## 2018-08-11 PROCEDURE — 3077F SYST BP >= 140 MM HG: CPT | Mod: CPTII,S$GLB,, | Performed by: NURSE PRACTITIONER

## 2018-08-11 PROCEDURE — 99213 OFFICE O/P EST LOW 20 MIN: CPT | Mod: 25,S$GLB,, | Performed by: NURSE PRACTITIONER

## 2018-08-11 PROCEDURE — 73562 X-RAY EXAM OF KNEE 3: CPT | Mod: 26,LT,, | Performed by: RADIOLOGY

## 2018-08-11 PROCEDURE — 99999 PR PBB SHADOW E&M-EST. PATIENT-LVL IV: CPT | Mod: PBBFAC,,, | Performed by: NURSE PRACTITIONER

## 2018-08-11 PROCEDURE — 3079F DIAST BP 80-89 MM HG: CPT | Mod: CPTII,S$GLB,, | Performed by: NURSE PRACTITIONER

## 2018-08-11 PROCEDURE — 3008F BODY MASS INDEX DOCD: CPT | Mod: CPTII,S$GLB,, | Performed by: NURSE PRACTITIONER

## 2018-08-11 RX ORDER — KETOROLAC TROMETHAMINE 30 MG/ML
30 INJECTION, SOLUTION INTRAMUSCULAR; INTRAVENOUS
Status: COMPLETED | OUTPATIENT
Start: 2018-08-11 | End: 2018-08-11

## 2018-08-11 RX ORDER — CELECOXIB 400 MG/1
400 CAPSULE ORAL 2 TIMES DAILY
Qty: 60 CAPSULE | Refills: 0 | Status: SHIPPED | OUTPATIENT
Start: 2018-08-11 | End: 2019-01-21

## 2018-08-11 RX ADMIN — KETOROLAC TROMETHAMINE 30 MG: 30 INJECTION, SOLUTION INTRAMUSCULAR; INTRAVENOUS at 03:08

## 2018-08-11 NOTE — PROGRESS NOTES
Subjective:       Patient ID: Keyona Herrera is a 62 y.o. female who presents to urgent care with complaints of new onset of left knee pain, sits most of day at work, denies any injury or falls, says the pain began this week and has progressively gotten worse, has been taking tylenol with little help, knee is very stiff with first movement but after a few steps the pain is improved.      Chief Complaint: Knee Pain (left)    HPI  Review of Systems   Constitutional: Negative for appetite change, chills, fatigue, fever and unexpected weight change.   Respiratory: Negative for cough, chest tightness, shortness of breath and wheezing.    Cardiovascular: Negative for chest pain and leg swelling.   Gastrointestinal: Negative for abdominal distention, abdominal pain, anal bleeding, blood in stool, constipation, diarrhea, nausea, rectal pain and vomiting.   Genitourinary: Negative for difficulty urinating, dysuria, flank pain, frequency and hematuria.   Musculoskeletal: Positive for gait problem and joint swelling. Negative for back pain.   Neurological: Negative for syncope, weakness and numbness.   Hematological: Does not bruise/bleed easily.   Psychiatric/Behavioral: Negative for agitation, confusion, decreased concentration and hallucinations. The patient is not nervous/anxious and is not hyperactive.        Objective:      Physical Exam   Constitutional: She is oriented to person, place, and time. She appears well-developed and well-nourished. No distress.   HENT:   Head: Normocephalic.   Eyes: Pupils are equal, round, and reactive to light.   Cardiovascular: Normal rate, regular rhythm and normal heart sounds.    Pulmonary/Chest: Effort normal and breath sounds normal. No respiratory distress. She has no wheezes. She has no rales.   Abdominal: Soft. She exhibits no mass. There is no rebound and no guarding.   Musculoskeletal:        Left knee: She exhibits normal patellar mobility, no bony tenderness, normal  meniscus and no MCL laxity. Tenderness found. No medial joint line, no lateral joint line, no MCL, no LCL and no patellar tendon tenderness noted.   Neurological: She is alert and oriented to person, place, and time.   Skin: Skin is warm and dry.   Psychiatric: She has a normal mood and affect. Her behavior is normal. Judgment and thought content normal.   Nursing note and vitals reviewed.      Assessment:       1. Acute pain of left knee        Plan:       Knee xray:  [employer] DJD most severe at the femoral patellar joint    Keyona was seen today for knee pain.    Diagnoses and all orders for this visit:    Acute pain of left knee  -     Cancel: X-Ray Knee Complete 4 or More Views Left; Future  -     Ambulatory referral to Orthopedics    Other orders  -     ketorolac injection 30 mg; Inject 1 mL (30 mg total) into the muscle one time.  -     celecoxib (CELEBREX) 400 MG capsule; Take 1 capsule (400 mg total) by mouth 2 (two) times daily.        Education  Pt has been given instructions populated from [employer] database and those entered into patient instructions field and has verbalized understanding of the after visit summary and information contained wherein.    Follow Up  If no better 3-5 days fu with pcp  Will make referral to ortho  Xray results discussed with pt.    In Case of Emergency   May go to ER for acute shortness of breath, lightheadedness, fever, or any other emergent complaints or changes in condition.

## 2018-08-11 NOTE — PATIENT INSTRUCTIONS
Knee Pain  Knee pain is very common. Its especially common in active people who put a lot of pressure on their knees, like runners. It affects women more often than men.  Your kneecap (patella) is a thick, round bone. It covers and protects the front portion of your knee joint. It moves along a groove in your thighbone (femur) as part of the patellofemoral joint. A layer of cartilage surrounds the underside of your kneecap. This layer protects it from grinding against your femur.  When this cartilage softens and breaks down, it can cause knee pain. This is partly because of repetitive stress. The stress irritates the lining of the joint. This causes pain in the underlying bone.  What causes knee pain?  Many things can cause knee pain. You may have more than one cause. Some of these include:  · Overuse of the knee joint  · The kneecap doesnt line up with the tissue around it  · Damage to small nerves in the area  · Damage to the ligament-like structure that holds the kneecap in place (retinaculum)  · Breakdown of the bone under the cartilage  · Swelling in the soft tissues around the kneecap  · Injury  You might be more likely to have knee pain if you:  · Exercise a lot  · Recently increased the intensity of your workouts  · Have a body mass index (BMI) greater than 25  · Have poor alignment of your kneecap  · Walk with your feet turned overly outward or inward  · Have weakness in surrounding muscle groups (inner quad or hip adductor muscles)  · Have too much tightness in surrounding muscle groups (hamstrings or iliotibial band)  · Have a recent history of injury to the area  · Are female  Symptoms of knee pain  This type of knee pain is a dull, aching pain in the front of the knee in the area under and around the kneecap. This pain may start quickly or slowly. Your pain might be worse when you squat, run, or sit for a long time. You might also sometimes feel like your knee is giving out. You may have symptoms in  one or both of your knees.  Diagnosing knee pain  Your healthcare provider will ask about your medical history and your symptoms. Be sure to describe any activities that make your knee pain worse. He or she will look at your knee. This will include tests of your range of motion, strength, and areas of pain of your knee. Your knee alignment will be checked.  Your healthcare provider will need to rule out other causes of your knee pain, such as arthritis. You may need an imaging test, such as an X-ray or MRI.  Treatment for knee pain  Treatments that can help ease your symptoms may include:  · Avoiding activities for a while that make your pain worse, returning to activity over time  · Icing the outside of your knee when it causes you pain  · Taking over-the-counter pain medicine  · Wearing a knee brace or taping your knee to support it  · Wearing special shoe inserts to help keep your feet in the proper alignment  · Doing special exercises to stretch and strengthen the muscles around your hip and your knee  These steps help most people manage knee pain. But some cases of knee pain need to be treated with surgery. You may need surgery right away. Or you may need it later if other treatments dont work. Your healthcare provider may refer you to an orthopedic surgeon. He or she will talk with you about your choices.  Preventing knee pain  Losing weight and correcting excess muscle tightness or muscle weakness may help lower your risk.  In some cases, you can prevent knee pain. To help prevent a flare-up of knee pain, you do these things:  · Regularly do all the exercises your doctor or physical therapist advises  · Support your knee as advised by your doctor or physical therapist  · Increase training gradually, and ease up on training when needed  · Have an expert check your gait for running or other sporting activities  · Stretch properly before and after exercise  · Replace your running shoes regularly  · Lose excess  weight     When to call your healthcare provider  Call your healthcare provider right away if:  · Your symptoms dont get better after a few weeks of treatment  · You have any new symptoms   Date Last Reviewed: 4/1/2017  © 0770-3763 [employer]. [address] [place] [address], [place] [address]. All rights reserved. This information is not intended as a substitute for professional medical care. Always follow your healthcare professional's instructions.

## 2018-08-17 DIAGNOSIS — Z11.59 NEED FOR HEPATITIS C SCREENING TEST: ICD-10-CM

## 2018-11-29 ENCOUNTER — OFFICE VISIT (OUTPATIENT)
Dept: FAMILY MEDICINE | Facility: CLINIC | Age: 63
End: 2018-11-29
Payer: COMMERCIAL

## 2018-11-29 VITALS
WEIGHT: 221.88 LBS | HEART RATE: 78 BPM | OXYGEN SATURATION: 97 % | HEIGHT: 63 IN | DIASTOLIC BLOOD PRESSURE: 86 MMHG | SYSTOLIC BLOOD PRESSURE: 134 MMHG | TEMPERATURE: 98 F | BODY MASS INDEX: 39.31 KG/M2

## 2018-11-29 DIAGNOSIS — R03.0 BLOOD PRESSURE ELEVATED WITHOUT HISTORY OF HTN: ICD-10-CM

## 2018-11-29 DIAGNOSIS — G89.29 CHRONIC PAIN OF BOTH KNEES: Primary | ICD-10-CM

## 2018-11-29 DIAGNOSIS — M25.561 CHRONIC PAIN OF BOTH KNEES: Primary | ICD-10-CM

## 2018-11-29 DIAGNOSIS — M25.562 CHRONIC PAIN OF BOTH KNEES: Primary | ICD-10-CM

## 2018-11-29 DIAGNOSIS — M17.0 PRIMARY OSTEOARTHRITIS OF BOTH KNEES: ICD-10-CM

## 2018-11-29 DIAGNOSIS — J01.90 ACUTE RHINOSINUSITIS: ICD-10-CM

## 2018-11-29 PROCEDURE — 3075F SYST BP GE 130 - 139MM HG: CPT | Mod: CPTII,S$GLB,, | Performed by: NURSE PRACTITIONER

## 2018-11-29 PROCEDURE — 99214 OFFICE O/P EST MOD 30 MIN: CPT | Mod: 25,S$GLB,, | Performed by: NURSE PRACTITIONER

## 2018-11-29 PROCEDURE — 3079F DIAST BP 80-89 MM HG: CPT | Mod: CPTII,S$GLB,, | Performed by: NURSE PRACTITIONER

## 2018-11-29 PROCEDURE — 99999 PR PBB SHADOW E&M-EST. PATIENT-LVL IV: CPT | Mod: PBBFAC,,, | Performed by: NURSE PRACTITIONER

## 2018-11-29 PROCEDURE — 96372 THER/PROPH/DIAG INJ SC/IM: CPT | Mod: S$GLB,,, | Performed by: NURSE PRACTITIONER

## 2018-11-29 PROCEDURE — 3008F BODY MASS INDEX DOCD: CPT | Mod: CPTII,S$GLB,, | Performed by: NURSE PRACTITIONER

## 2018-11-29 RX ORDER — FLUTICASONE PROPIONATE 50 MCG
1 SPRAY, SUSPENSION (ML) NASAL DAILY
Qty: 1 BOTTLE | Refills: 0 | Status: ON HOLD | OUTPATIENT
Start: 2018-11-29 | End: 2022-11-14 | Stop reason: HOSPADM

## 2018-11-29 RX ORDER — IBUPROFEN 600 MG/1
600 TABLET ORAL DAILY
Qty: 30 TABLET | Refills: 0 | Status: SHIPPED | OUTPATIENT
Start: 2018-11-29 | End: 2019-01-21

## 2018-11-29 RX ORDER — KETOROLAC TROMETHAMINE 30 MG/ML
60 INJECTION, SOLUTION INTRAMUSCULAR; INTRAVENOUS
Status: COMPLETED | OUTPATIENT
Start: 2018-11-29 | End: 2018-11-29

## 2018-11-29 RX ORDER — BENZONATATE 200 MG/1
200 CAPSULE ORAL 3 TIMES DAILY PRN
Qty: 30 CAPSULE | Refills: 0 | Status: SHIPPED | OUTPATIENT
Start: 2018-11-29 | End: 2018-12-09

## 2018-11-29 RX ADMIN — KETOROLAC TROMETHAMINE 60 MG: 30 INJECTION, SOLUTION INTRAMUSCULAR; INTRAVENOUS at 04:11

## 2018-11-29 NOTE — PATIENT INSTRUCTIONS
Arthralgia    Arthralgia is the term for pain in or around the joint. It is a symptom, not a disease. This pain may involve one or more joints. In some cases, the pain moves from joint to joint.  There are many causes for joint pain. These include:  · Injury  · Osteoarthritis (wearing out of the joint surface)  · Gout (inflammation of the joint due to crystals in the joint fluid)  · Infection inside the joint    · Bursitis (inflammation of the fluid-filled sacs around the joint)  · Autoimmune disorders such as rheumatoid arthritis or lupus  · Tendonitis (inflammation of chords that attach muscle to bone)  Home care  · Rest the involved joint(s) until your symptoms improve.   · You may be prescribed pain medicine. If none is prescribed, you may use acetaminophen or ibuprofen to control pain and inflammation.  Follow-up care  Follow up with your healthcare provider or as advised.  When to seek medical advice  Contact your healthcare provider right away if any of the following occurs:  · Pain, swelling, or redness of joint increases  · Pain worsens or recurs after a period of improvement  · Pain moves to other joints  · You cannot bear weight on the affected joint   · You cannot move the affected joint  · Joint appears deformed  · New rash appears  · Fever of 100.4ºF (38ºC) or higher, or as directed by your healthcare provider  Date Last Reviewed: 3/1/2017  © 9913-4565 [employer]. [address] [place] [address], [place] [address]. All rights reserved. This information is not intended as a substitute for professional medical care. Always follow your healthcare professional's instructions.

## 2018-11-30 NOTE — PROGRESS NOTES
History of Present Illness   Keyona Herrera is a 63 y.o. woman with medical history as listed below who presents today for evaluation of knee pain and URI symptoms.    1. She reports persistent bilateral knee pain. She was initially seen during walk-in clinic about three months ago for left knee pain. Her x-ray revealed DJD. She reports she now has pain to bilateral knees L>R. She describes the pain as an ache with stiffness that is worse in the morning and when getting up after prolonged sitting. The pain does not radiate. There is no associated swelling, crepitus, redness, warmth, or obvious deformity. She is taking Ibuprofen with no relief.    2. She also reports two day history of cough/cold symptoms. She reports scratchy throat, nasal congestion, post nasal drip, dry cough, and sinus pressure. She has no fevers. She has not tried OTC medications for her symptoms.     She has no additional complaints and is otherwise healthy on today's visit.      Past Medical History:   Diagnosis Date    HTN (hypertension)     Hyperlipidemia     Obesity        Past Surgical History:   Procedure Laterality Date     SECTION      CHOLECYSTECTOMY      COLONOSCOPY N/A 2016    Procedure: COLONOSCOPY;  Surgeon: Edy Chanel MD;  Location: [facility];  Service: Endoscopy;  Laterality: N/A;    COLONOSCOPY N/A 2016    Performed by Edy [facility]el [facility] at [facility] ENDO    HYSTERECTOMY      INCISIONAL BREAST BIOPSY      surgery on foot         Social History     Socioeconomic History    Marital status:      Spouse name: None    Number of children: None    Years of education: None    Highest education level: None   Social Needs    Financial resource strain: None    Food insecurity - worry: None    Food insecurity - inability: None    Transportation needs - medical: None    Transportation needs - non-medical: None   Occupational History    None   Tobacco Use    Smoking status: Never Smoker     "Smokeless tobacco: Never Used   Substance and Sexual Activity    Alcohol use: Yes     Comment: socail drinker/ not often    Drug use: No    Sexual activity: Yes     Partners: Male   Other Topics Concern    None   Social History Narrative    None       Family History   Problem Relation Age of Onset    Dementia Mother     Aneurysm Father     Hypertension Unknown     Cancer Maternal Grandfather        Review of Systems  Review of Systems   Constitutional: Positive for malaise/fatigue. Negative for chills and fever.   HENT: Positive for congestion, ear pain and sore throat. Negative for ear discharge.    Eyes: Negative for discharge and redness.   Respiratory: Positive for cough. Negative for sputum production, shortness of breath and wheezing.    Cardiovascular: Negative for chest pain.   Gastrointestinal: Negative for nausea and vomiting.   Musculoskeletal: Positive for joint pain. Negative for falls.     A complete review of systems was otherwise negative.    Physical Exam  /86 (BP Location: Right arm, Patient Position: Sitting, BP Method: X-Large (Manual))   Pulse 78   Temp 98.4 °F (36.9 °C) (Oral)   Ht 5' 3" (1.6 m)   Wt 100.7 kg (221 lb 14.4 oz)   SpO2 97%   BMI 39.31 kg/m²   General appearance: alert, appears stated age, cooperative and no distress  Eyes: negative findings: lids and lashes normal and conjunctivae and sclerae normal  Ears: normal TM's and external ear canals both ears and bilateral middle ear effusion  Nose: clear discharge, moderate congestion, turbinates red, swollen, no sinus tenderness  Throat: lips, mucosa, and tongue normal; teeth and gums normal and moderate oropharyngeal erythema with clear post nasal drainage  Lungs: clear to auscultation bilaterally  Heart: regular rate and rhythm, S1, S2 normal, no murmur, click, rub or gallop  Extremities: extremities normal, atraumatic, no cyanosis or edema  Pulses: 2+ and symmetric  Skin: Skin color, texture, turgor normal. No " rashes or lesions  Lymph nodes: Cervical, supraclavicular, and axillary nodes normal.  Neurologic: Grossly normal  Musculoskeletal: Bilateral knees with FROM, no swelling/obvious deformity/erythema/crepitus, no evidence for instability, negative anterior and posterior drawer sign    Assessment/Plan  Chronic pain of both knees  One time IM dose of Toradol provided in clinic.  The Celebrex is making her drowsy, so Ibuprofen in the morning with Celebrex at bedtime.  Referral to orthopedics to discuss possible joint injections.  We discussed that she would also likely benefit from PT.  RTC RPN.  -     ketorolac injection 60 mg  -     Ambulatory referral to Orthopedics  -     ibuprofen (ADVIL,MOTRIN) 600 MG tablet; Take 1 tablet (600 mg total) by mouth once daily.  Dispense: 30 tablet; Refill: 0    Primary osteoarthritis of both knees  As above.  -     ketorolac injection 60 mg  -     Ambulatory referral to Orthopedics  -     ibuprofen (ADVIL,MOTRIN) 600 MG tablet; Take 1 tablet (600 mg total) by mouth once daily.  Dispense: 30 tablet; Refill: 0    Acute rhinosinusitis  Viral, antibiotics not indicated.  Flonase daily. May add second generation anti-histamine as needed.  Tessalon PRN cough.  Rest and stay hydrated.  RTC PRN.  -     fluticasone (FLONASE) 50 mcg/actuation nasal spray; 1 spray (50 mcg total) by Each Nare route once daily.  Dispense: 1 Bottle; Refill: 0  -     benzonatate (TESSALON) 200 MG capsule; Take 1 capsule (200 mg total) by mouth 3 (three) times daily as needed.  Dispense: 30 capsule; Refill: 0    Blood pressure elevated without history of HTN  Repeat blood pressure within normal limits.  We will have her return for a nurse visit, if elevated we will schedule her a follow-up appointment to discuss HTN medication.    She has verbalized understanding and is in agreement with plan of care.    Follow-up in about 1 week (around 12/6/2018), or if symptoms worsen or fail to improve, for nurse BP visit.

## 2018-12-07 ENCOUNTER — CLINICAL SUPPORT (OUTPATIENT)
Dept: FAMILY MEDICINE | Facility: CLINIC | Age: 63
End: 2018-12-07
Payer: COMMERCIAL

## 2018-12-07 ENCOUNTER — OFFICE VISIT (OUTPATIENT)
Dept: ORTHOPEDICS | Facility: CLINIC | Age: 63
End: 2018-12-07
Payer: COMMERCIAL

## 2018-12-07 VITALS
HEART RATE: 76 BPM | WEIGHT: 222 LBS | HEIGHT: 63 IN | SYSTOLIC BLOOD PRESSURE: 128 MMHG | BODY MASS INDEX: 39.34 KG/M2 | DIASTOLIC BLOOD PRESSURE: 80 MMHG

## 2018-12-07 VITALS — OXYGEN SATURATION: 98 % | SYSTOLIC BLOOD PRESSURE: 120 MMHG | DIASTOLIC BLOOD PRESSURE: 80 MMHG | HEART RATE: 76 BPM

## 2018-12-07 DIAGNOSIS — R03.0 BLOOD PRESSURE ELEVATED WITHOUT HISTORY OF HTN: Primary | ICD-10-CM

## 2018-12-07 DIAGNOSIS — M25.562 PAIN IN BOTH KNEES, UNSPECIFIED CHRONICITY: Primary | ICD-10-CM

## 2018-12-07 DIAGNOSIS — M17.0 PRIMARY OSTEOARTHRITIS OF BOTH KNEES: ICD-10-CM

## 2018-12-07 DIAGNOSIS — M25.561 PAIN IN BOTH KNEES, UNSPECIFIED CHRONICITY: Primary | ICD-10-CM

## 2018-12-07 PROCEDURE — 99999 PR PBB SHADOW E&M-EST. PATIENT-LVL II: CPT | Mod: PBBFAC,,,

## 2018-12-07 PROCEDURE — 3008F BODY MASS INDEX DOCD: CPT | Mod: CPTII,S$GLB,, | Performed by: ORTHOPAEDIC SURGERY

## 2018-12-07 PROCEDURE — 99499 UNLISTED E&M SERVICE: CPT | Mod: S$GLB,,, | Performed by: FAMILY MEDICINE

## 2018-12-07 PROCEDURE — 3079F DIAST BP 80-89 MM HG: CPT | Mod: CPTII,S$GLB,, | Performed by: ORTHOPAEDIC SURGERY

## 2018-12-07 PROCEDURE — 99999 PR PBB SHADOW E&M-EST. PATIENT-LVL III: CPT | Mod: PBBFAC,,, | Performed by: ORTHOPAEDIC SURGERY

## 2018-12-07 PROCEDURE — 3074F SYST BP LT 130 MM HG: CPT | Mod: CPTII,S$GLB,, | Performed by: ORTHOPAEDIC SURGERY

## 2018-12-07 PROCEDURE — 99204 OFFICE O/P NEW MOD 45 MIN: CPT | Mod: S$GLB,,, | Performed by: ORTHOPAEDIC SURGERY

## 2018-12-07 NOTE — PATIENT INSTRUCTIONS
Your knees were injected with two medications today: a numbing medicine (lidocaine) and a corticosteroid (kenalog).  The numbing medicine works immediately and works for a few hours. The steroid medication takes 2-3 days to work.   You may notice that your pain returns or even worsens after the numbing medicine wears off.    If pain worsens, treat with ice 30 minutes on and 10 minutes off the area, over the counter or prescription anti-inflammatories and rest.    Call for any redness, fevers, chills or inability to move the joint. Call the office if pain does not improve in 2-3 days.

## 2018-12-07 NOTE — LETTER
December 7, 2018      Kaela Wan, EDAURDO  [address]  [place] [address]           [facility]  [address] [place] [address]  Phone: [phone]          Patient: Keyona Herrera   MR Number: [id number]   YOB: 1955   Date of Visit: 12/7/2018       Dear Kaela Wan:    Thank you for referring Keyona Herrera to me for evaluation. Attached you will find relevant portions of my assessment and plan of care.    If you have questions, please do not hesitate to call me. I look forward to following Keyona Herrera along with you.    Sincerely,    Araseli Liang MD    Enclosure  CC:  No Recipients    If you would like to receive this communication electronically, please contact [email or web address] or [phone] to request more information on [employer] Link access.    For providers and/or their staff who would like to refer a patient to [facility], please contact us through our one-stop-shop provider referral line, [facility], at [phone].    If you feel you have received this communication in error or would no longer like to receive these types of communications, please e-mail [email or web address]

## 2018-12-07 NOTE — PROGRESS NOTES
Keyona Herrera 63 y.o. female is here today for Blood Pressure check.   History of HTN no.    Review of patient's allergies indicates:  No Known Allergies  Creatinine   Date Value Ref Range Status   12/29/2017 0.7 0.5 - 1.4 mg/dL Final     Sodium   Date Value Ref Range Status   12/29/2017 141 136 - 145 mmol/L Final     Potassium   Date Value Ref Range Status   12/29/2017 4.5 3.5 - 5.1 mmol/L Final   ]  Patient denies taking blood pressure medications on a regular basis at the same time of the day. Patient is not currently diagnosed with HTN and does not take medication.    Current Outpatient Medications:     benzonatate (TESSALON) 200 MG capsule, Take 1 capsule (200 mg total) by mouth 3 (three) times daily as needed., Disp: 30 capsule, Rfl: 0    celecoxib (CELEBREX) 400 MG capsule, Take 1 capsule (400 mg total) by mouth 2 (two) times daily., Disp: 60 capsule, Rfl: 0    coenzyme Q10 100 mg capsule, Take 100 mg by mouth once daily., Disp: , Rfl:     flaxseed oil 1,000 mg Cap, Take by mouth. 1 Capsule Oral Every day, Disp: , Rfl:     fluticasone (FLONASE) 50 mcg/actuation nasal spray, 1 spray (50 mcg total) by Each Nare route once daily., Disp: 1 Bottle, Rfl: 0    ibuprofen (ADVIL,MOTRIN) 600 MG tablet, Take 1 tablet (600 mg total) by mouth once daily., Disp: 30 tablet, Rfl: 0    meclizine (ANTIVERT) 25 mg tablet, Take 1 tablet (25 mg total) by mouth 3 (three) times daily as needed for Dizziness., Disp: 60 tablet, Rfl: 1    multivitamin capsule, Take by mouth. 1 Capsule Oral Every day, Disp: , Rfl:     omega-3 fatty acids 1,000 mg Cap, Take by mouth. 1 Capsule Oral Every day, Disp: , Rfl:   Does patient have record of home blood pressure readings no.    Last dose of blood pressure medication was taken at N/A.  Patient is asymptomatic.   Complains of none.    Vitals:    12/07/18 1116   BP: 120/80   BP Location: Left arm   Patient Position: Sitting   BP Method: Large (Manual)   Pulse: 76   SpO2: 98%          Dr. Palomo notified.

## 2018-12-07 NOTE — PROGRESS NOTES
CC: bilateral knee pain    This patient was seen in consultation at the request of Kaela Wan      HPI: Keyona Herrera is a 63 y.o. female who presents today complaining of bilateral knee pain  R> L  Duration of symptoms:  Since August -- 4 months   Trauma or new activity: no  Pain is constant   2/10 at best and 10/10 at its worst  Aggravating factors: Getting up after sitting at her desk for a long period of time, stairs, walking long periods of time   Relieving factors: Walking   Radicular symptoms: no numbness, paresthesias and radiation of pain to the foot   Associated symptoms:  swelling and popping.    Prior treatment:  prescription NSAIDs with improvement in pain.   She takes ibuprofen during the day because she finds that the celebrex from her PCP makes her sleepy   Pain does interfere with activities of daily living .    This is the extent of the patient's complaints at this time.      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    HENT: Negative.    Eyes: Negative.    Respiratory: Negative.    Cardiovascular: Negative.    Gastrointestinal: Negative.    Genitourinary: Negative.    Musculoskeletal: Positive for joint pain.   Skin: Negative.    Neurological: Negative.    Endo/Heme/Allergies: Negative.    Psychiatric/Behavioral: Negative.          Review of patient's allergies indicates:  No Known Allergies      Current Outpatient Medications:     benzonatate (TESSALON) 200 MG capsule, Take 1 capsule (200 mg total) by mouth 3 (three) times daily as needed., Disp: 30 capsule, Rfl: 0    celecoxib (CELEBREX) 400 MG capsule, Take 1 capsule (400 mg total) by mouth 2 (two) times daily., Disp: 60 capsule, Rfl: 0    coenzyme Q10 100 mg capsule, Take 100 mg by mouth once daily., Disp: , Rfl:     flaxseed oil 1,000 mg Cap, Take by mouth. 1 Capsule Oral Every day, Disp: , Rfl:     fluticasone (FLONASE) 50 mcg/actuation nasal spray, 1  spray (50 mcg total) by Each Nare route once daily., Disp: 1 Bottle, Rfl: 0    ibuprofen (ADVIL,MOTRIN) 600 MG tablet, Take 1 tablet (600 mg total) by mouth once daily., Disp: 30 tablet, Rfl: 0    meclizine (ANTIVERT) 25 mg tablet, Take 1 tablet (25 mg total) by mouth 3 (three) times daily as needed for Dizziness., Disp: 60 tablet, Rfl: 1    multivitamin capsule, Take by mouth. 1 Capsule Oral Every day, Disp: , Rfl:     omega-3 fatty acids 1,000 mg Cap, Take by mouth. 1 Capsule Oral Every day, Disp: , Rfl:     Past Medical History:   Diagnosis Date    HTN (hypertension)     Hyperlipidemia     Obesity        Patient Active Problem List   Diagnosis    Hyperlipidemia    Obesity    Dizziness       Past Surgical History:   Procedure Laterality Date     SECTION      CHOLECYSTECTOMY      COLONOSCOPY N/A 2016    Procedure: COLONOSCOPY;  Surgeon: Edy Chanel MD;  Location: [facility] END[facility];  Service: Endoscopy;  Laterality: N/A;    COLONOSCOPY N/A 2016    Performed by Edy Chanel MD at [facility]    HYSTERECTOMY      INCISIONAL BREAST BIOPSY      surgery on foot         Social History     Tobacco Use    Smoking status: Never Smoker    Smokeless tobacco: Never Used   Substance Use Topics    Alcohol use: Yes     Comment: socail drinker/ not often    Drug use: No       Family History   Problem Relation Age of Onset    Dementia Mother     Aneurysm Father     Hypertension Unknown     Cancer Maternal Grandfather        Physical Exam:     Vitals:    18 0806   BP: 128/80   Pulse: 76      General: Weight: 100.7 kg (222 lb 0.1 oz) Body mass index is 39.33 kg/m².  Patient is alert, awake and oriented to time, place and person. Mood and affect are appropriate.  Patient does not appear to be in any distress, denies any constitutional symptoms and appears stated age.   HEENT: Pupils are equal and round, sclera are not injected. External examination of ears and nose reveals no abnormalities. Cranial  nerves II-X are grossly intact  Skin: no rashes, abrasions or open wounds on the affected extremity   Resp: No respiratory distress or audible wheezing   CV: 2+  pulses, all extremities warm and well perfused     Bilateral knees:     Localizes pain over medial joint line and anterior knee   Moderate effusion worse on right than left   No swelling or erythema   Active ROM: 0 - 110  Passive ROM: 0 - 110  Pain over anterior knee with deep flexion   + correctable varus deformity   Tender to palpation over patella and medial joint line   good  quadricep muscle tone and bulk  normal (0 - 2 mm) Anterior drawer   normal (0 - 2 mm) posterior drawer   negative valgus instability   negative varus instability   Normal  patellar tracking   + pain with  patellar compression   ltsi s/s/sp/dp/t   + ehl/fhl/ta/gs  2 + DP     Imaging:  3 views bilateral knees:  severe tricompartmental OA with subchondral sclerosis, joint space narrowing, osteophyte formation and loose bodies.      Assessment: 63 y.o. female with bilateral knee OA    I explained my diagnostic impression and the reasoning behind it in detail, using layman's terms.  Models and/or pictures were used to help in the explanation.  We discussed non operative and operative treatment options.    Plan:   - injection bilateral knees, see associated note   - Continue celebrex and ibuprofen     - Return to clinic in 6 months. Return sooner if symptoms worsen or fail to improve.    All questions were answered in detail. The patient is in full agreement with the treatment plan and will proceed accordingly.    A note notifying Kaela Wan of my findings was sent via the electronic medical record

## 2019-01-21 ENCOUNTER — OFFICE VISIT (OUTPATIENT)
Dept: ORTHOPEDICS | Facility: CLINIC | Age: 64
End: 2019-01-21
Payer: COMMERCIAL

## 2019-01-21 VITALS
HEIGHT: 63 IN | WEIGHT: 218.69 LBS | DIASTOLIC BLOOD PRESSURE: 100 MMHG | SYSTOLIC BLOOD PRESSURE: 120 MMHG | BODY MASS INDEX: 38.75 KG/M2 | HEART RATE: 82 BPM

## 2019-01-21 DIAGNOSIS — M17.0 PRIMARY OSTEOARTHRITIS OF BOTH KNEES: ICD-10-CM

## 2019-01-21 DIAGNOSIS — M17.11 PRIMARY OSTEOARTHRITIS OF RIGHT KNEE: Primary | ICD-10-CM

## 2019-01-21 PROCEDURE — 3008F BODY MASS INDEX DOCD: CPT | Mod: CPTII,S$GLB,, | Performed by: ORTHOPAEDIC SURGERY

## 2019-01-21 PROCEDURE — 3080F PR MOST RECENT DIASTOLIC BLOOD PRESSURE >= 90 MM HG: ICD-10-PCS | Mod: CPTII,S$GLB,, | Performed by: ORTHOPAEDIC SURGERY

## 2019-01-21 PROCEDURE — 99213 OFFICE O/P EST LOW 20 MIN: CPT | Mod: S$GLB,,, | Performed by: ORTHOPAEDIC SURGERY

## 2019-01-21 PROCEDURE — 3074F SYST BP LT 130 MM HG: CPT | Mod: CPTII,S$GLB,, | Performed by: ORTHOPAEDIC SURGERY

## 2019-01-21 PROCEDURE — 99999 PR PBB SHADOW E&M-EST. PATIENT-LVL III: CPT | Mod: PBBFAC,,, | Performed by: ORTHOPAEDIC SURGERY

## 2019-01-21 PROCEDURE — 99213 PR OFFICE/OUTPT VISIT, EST, LEVL III, 20-29 MIN: ICD-10-PCS | Mod: S$GLB,,, | Performed by: ORTHOPAEDIC SURGERY

## 2019-01-21 PROCEDURE — 3080F DIAST BP >= 90 MM HG: CPT | Mod: CPTII,S$GLB,, | Performed by: ORTHOPAEDIC SURGERY

## 2019-01-21 PROCEDURE — 3074F PR MOST RECENT SYSTOLIC BLOOD PRESSURE < 130 MM HG: ICD-10-PCS | Mod: CPTII,S$GLB,, | Performed by: ORTHOPAEDIC SURGERY

## 2019-01-21 PROCEDURE — 99999 PR PBB SHADOW E&M-EST. PATIENT-LVL III: ICD-10-PCS | Mod: PBBFAC,,, | Performed by: ORTHOPAEDIC SURGERY

## 2019-01-21 PROCEDURE — 3008F PR BODY MASS INDEX (BMI) DOCUMENTED: ICD-10-PCS | Mod: CPTII,S$GLB,, | Performed by: ORTHOPAEDIC SURGERY

## 2019-01-21 RX ORDER — CELECOXIB 400 MG/1
400 CAPSULE ORAL 2 TIMES DAILY
Qty: 60 CAPSULE | Refills: 0 | Status: SHIPPED | OUTPATIENT
Start: 2019-01-21 | End: 2019-01-25 | Stop reason: ALTCHOICE

## 2019-01-21 NOTE — PROGRESS NOTES
Follow up visit    History of Present Illness:   Keyona comes to the office for follow up evaluation of bilateral knee pain.  She was last seen in December and underwent injections to both knees. She got good relief in both knees but reports that the pain recently came back in the right knee (about 5 weeks of relief).  Her pain has not returned in the left knee.  She is out of celebrex.    ROS: unremarkable and no change since last visit    Physical Examination:    NAD  Right knee   No swelling or erythema   Active ROM: 0 - 110  Passive ROM: 0 - 110  Pain over anterior knee with deep flexion   Tender to palpation over patella and medial joint line   good  quadricep muscle tone and bulk  ltsi s/s/sp/dp/t   + ehl/fhl/ta/gs  2 + DP    NV status: Unchanged    Radiographic imaging: no new imaging    Assessment/Plan:  1. Bilateral knee arthritis, right more symptomatic than left     We discussed the etiology of persistent pain and further treatment options including operative treatment and further non operative treatment  1. Celebrex refill, take PRN  2. Activity modification. Patient will continue with activity modification till the pain is controlled and then gradually introduce strenous activities.  3. Physical therapy: A prescription of physical therapy was given to the patient  4. Pre auth for synvisc series to right knee   5. Will call to schedule injections once approved     All questions were answered in detail. The patient  verbalized the understanding of the treatment plan and is in full agreement with the treatment plan.

## 2019-01-24 ENCOUNTER — TELEPHONE (OUTPATIENT)
Dept: ORTHOPEDICS | Facility: CLINIC | Age: 64
End: 2019-01-24

## 2019-01-24 NOTE — TELEPHONE ENCOUNTER
Spoken with patient with the change of medication: celebrex to meloxicam and she should not take the mobic and the celebrex. She cannot take the mobic with any OTC pain medications except tylenol. Patient understood what was told to her. Thanks

## 2019-01-25 ENCOUNTER — TELEPHONE (OUTPATIENT)
Dept: ORTHOPEDICS | Facility: CLINIC | Age: 64
End: 2019-01-25

## 2019-01-25 RX ORDER — MELOXICAM 7.5 MG/1
7.5 TABLET ORAL DAILY
Qty: 30 TABLET | Refills: 0 | Status: SHIPPED | OUTPATIENT
Start: 2019-01-25 | End: 2022-06-03

## 2019-01-26 ENCOUNTER — OFFICE VISIT (OUTPATIENT)
Dept: FAMILY MEDICINE | Facility: CLINIC | Age: 64
End: 2019-01-26
Payer: COMMERCIAL

## 2019-01-26 VITALS
OXYGEN SATURATION: 96 % | RESPIRATION RATE: 17 BRPM | SYSTOLIC BLOOD PRESSURE: 130 MMHG | BODY MASS INDEX: 38.98 KG/M2 | HEIGHT: 63 IN | WEIGHT: 220 LBS | DIASTOLIC BLOOD PRESSURE: 85 MMHG | TEMPERATURE: 99 F | HEART RATE: 81 BPM

## 2019-01-26 DIAGNOSIS — M17.0 BILATERAL PRIMARY OSTEOARTHRITIS OF KNEE: Primary | ICD-10-CM

## 2019-01-26 PROCEDURE — 99214 OFFICE O/P EST MOD 30 MIN: CPT | Mod: 25,S$GLB,, | Performed by: FAMILY MEDICINE

## 2019-01-26 PROCEDURE — 99214 PR OFFICE/OUTPT VISIT, EST, LEVL IV, 30-39 MIN: ICD-10-PCS | Mod: 25,S$GLB,, | Performed by: FAMILY MEDICINE

## 2019-01-26 PROCEDURE — 96372 PR INJECTION,THERAP/PROPH/DIAG2ST, IM OR SUBCUT: ICD-10-PCS | Mod: S$GLB,,, | Performed by: FAMILY MEDICINE

## 2019-01-26 PROCEDURE — 3008F PR BODY MASS INDEX (BMI) DOCUMENTED: ICD-10-PCS | Mod: CPTII,S$GLB,, | Performed by: FAMILY MEDICINE

## 2019-01-26 PROCEDURE — 3075F PR MOST RECENT SYSTOLIC BLOOD PRESS GE 130-139MM HG: ICD-10-PCS | Mod: CPTII,S$GLB,, | Performed by: FAMILY MEDICINE

## 2019-01-26 PROCEDURE — 99999 PR PBB SHADOW E&M-EST. PATIENT-LVL III: ICD-10-PCS | Mod: PBBFAC,,, | Performed by: FAMILY MEDICINE

## 2019-01-26 PROCEDURE — 3079F PR MOST RECENT DIASTOLIC BLOOD PRESSURE 80-89 MM HG: ICD-10-PCS | Mod: CPTII,S$GLB,, | Performed by: FAMILY MEDICINE

## 2019-01-26 PROCEDURE — 3075F SYST BP GE 130 - 139MM HG: CPT | Mod: CPTII,S$GLB,, | Performed by: FAMILY MEDICINE

## 2019-01-26 PROCEDURE — 3079F DIAST BP 80-89 MM HG: CPT | Mod: CPTII,S$GLB,, | Performed by: FAMILY MEDICINE

## 2019-01-26 PROCEDURE — 96372 THER/PROPH/DIAG INJ SC/IM: CPT | Mod: S$GLB,,, | Performed by: FAMILY MEDICINE

## 2019-01-26 PROCEDURE — 3008F BODY MASS INDEX DOCD: CPT | Mod: CPTII,S$GLB,, | Performed by: FAMILY MEDICINE

## 2019-01-26 PROCEDURE — 99999 PR PBB SHADOW E&M-EST. PATIENT-LVL III: CPT | Mod: PBBFAC,,, | Performed by: FAMILY MEDICINE

## 2019-01-26 RX ORDER — KETOROLAC TROMETHAMINE 10 MG/1
10 TABLET, FILM COATED ORAL EVERY 8 HOURS PRN
Qty: 60 TABLET | Refills: 0 | Status: SHIPPED | OUTPATIENT
Start: 2019-01-26 | End: 2019-08-20

## 2019-01-26 RX ORDER — KETOROLAC TROMETHAMINE 10 MG/1
10 TABLET, FILM COATED ORAL EVERY 8 HOURS PRN
Qty: 60 TABLET | Refills: 0 | Status: SHIPPED | OUTPATIENT
Start: 2019-01-26 | End: 2019-01-26 | Stop reason: SDUPTHER

## 2019-01-26 RX ORDER — KETOROLAC TROMETHAMINE 30 MG/ML
60 INJECTION, SOLUTION INTRAMUSCULAR; INTRAVENOUS
Status: COMPLETED | OUTPATIENT
Start: 2019-01-26 | End: 2019-01-26

## 2019-01-26 RX ADMIN — KETOROLAC TROMETHAMINE 60 MG: 30 INJECTION, SOLUTION INTRAMUSCULAR; INTRAVENOUS at 09:01

## 2019-01-26 NOTE — PROGRESS NOTES
Subjective:       Patient ID: Keyona Herrera is a 63 y.o. female.    Chief Complaint: Knee Pain (rt knee pain)    HPI   64 yo female presents for R knee pain. Has a hx of OA and follows ortho. Currently she is waiting approval for synvisc. Pt was given mobic but feels that has not helped. States the pain is severe. Endorses difficulty ambulating.     Review of Systems   Constitutional: Negative.    HENT: Negative.    Respiratory: Negative.    Cardiovascular: Negative.    Gastrointestinal: Negative.    Genitourinary: Negative.    Musculoskeletal:        B/l knee pain. R>L   Neurological: Negative.    Psychiatric/Behavioral: Negative.         Past Medical History:   Diagnosis Date    HTN (hypertension)     Hyperlipidemia     Obesity      Past Surgical History:   Procedure Laterality Date     SECTION      CHOLECYSTECTOMY      COLONOSCOPY N/A 2016    [facility]    HYSTERECTOMY      INCISIONAL BREAST BIOPSY      surgery on foot       Family History   Problem Relation Age of Onset    Dementia Mother     Aneurysm Father     Hypertension Unknown     Cancer Maternal Grandfather      Social History     Socioeconomic History    Marital status:      Spouse name: None    Number of children: None    Years of education: None    Highest education level: None   Social Needs    Financial resource strain: None    Food insecurity - worry: None    Food insecurity - inability: None    Transportation needs - medical: None    Transportation needs - non-medical: None   Occupational History    None   Tobacco Use    Smoking status: Never Smoker    Smokeless tobacco: Never Used   Substance and Sexual Activity    Alcohol use: Yes     Comment: socail drinker/ not often    Drug use: No    Sexual activity: Yes     Partners: Male   Other Topics Concern    None   Social History Narrative    None        Objective:      Vitals:    19 0847   BP: 130/85   Pulse:  81   Resp: 17   Temp: 98.6 °F (37 °C)     Physical Exam   Constitutional: She is oriented to person, place, and time. No distress.   HENT:   Head: Normocephalic and atraumatic.   Eyes: Conjunctivae are normal.   Neck: Neck supple.   Cardiovascular: Normal rate, regular rhythm and normal heart sounds. Exam reveals no gallop and no friction rub.   No murmur heard.  Pulmonary/Chest: Effort normal and breath sounds normal. She has no wheezes. She has no rales.   Musculoskeletal:        Right knee: She exhibits decreased range of motion. Tenderness found.   Neurological: She is alert and oriented to person, place, and time.   Skin: Skin is warm and dry.   Psychiatric: She has a normal mood and affect. Her behavior is normal. Judgment and thought content normal.        Assessment:       1. Bilateral primary osteoarthritis of knee        Plan:       Bilateral primary osteoarthritis of knee  - Advised pt to switch mobic to toradol. Advised pt to f/u with ortho about pain. Will give toradol injection today.  -     ketorolac injection 60 mg  -     ketorolac (TORADOL) 10 mg tablet; Take 1 tablet (10 mg total) by mouth every 8 (eight) hours as needed for Pain.  Dispense: 60 tablet; Refill: 0      Follow-up if symptoms worsen or fail to improve.            Bentley Borrego MD  1/26/2019 9:09 AM

## 2019-02-05 ENCOUNTER — OFFICE VISIT (OUTPATIENT)
Dept: ORTHOPEDICS | Facility: CLINIC | Age: 64
End: 2019-02-05
Payer: COMMERCIAL

## 2019-02-05 ENCOUNTER — HOSPITAL ENCOUNTER (OUTPATIENT)
Dept: RADIOLOGY | Facility: HOSPITAL | Age: 64
Discharge: HOME | End: 2019-02-05
Attending: ORTHOPAEDIC SURGERY
Payer: COMMERCIAL

## 2019-02-05 VITALS
BODY MASS INDEX: 39.23 KG/M2 | SYSTOLIC BLOOD PRESSURE: 135 MMHG | RESPIRATION RATE: 18 BRPM | WEIGHT: 221.44 LBS | HEART RATE: 80 BPM | DIASTOLIC BLOOD PRESSURE: 85 MMHG

## 2019-02-05 DIAGNOSIS — M25.561 CHRONIC PAIN OF BOTH KNEES: Primary | ICD-10-CM

## 2019-02-05 DIAGNOSIS — G89.29 CHRONIC PAIN OF BOTH KNEES: Primary | ICD-10-CM

## 2019-02-05 DIAGNOSIS — G89.29 CHRONIC PAIN OF BOTH KNEES: ICD-10-CM

## 2019-02-05 DIAGNOSIS — M17.11 PRIMARY OSTEOARTHRITIS OF RIGHT KNEE: ICD-10-CM

## 2019-02-05 DIAGNOSIS — M25.561 CHRONIC PAIN OF BOTH KNEES: ICD-10-CM

## 2019-02-05 DIAGNOSIS — M25.562 CHRONIC PAIN OF BOTH KNEES: Primary | ICD-10-CM

## 2019-02-05 DIAGNOSIS — M25.562 CHRONIC PAIN OF BOTH KNEES: ICD-10-CM

## 2019-02-05 PROCEDURE — 99214 PR OFFICE/OUTPT VISIT, EST, LEVL IV, 30-39 MIN: ICD-10-PCS | Mod: 57,S$GLB,, | Performed by: ORTHOPAEDIC SURGERY

## 2019-02-05 PROCEDURE — 3008F BODY MASS INDEX DOCD: CPT | Mod: CPTII,S$GLB,, | Performed by: ORTHOPAEDIC SURGERY

## 2019-02-05 PROCEDURE — 99999 PR PBB SHADOW E&M-EST. PATIENT-LVL III: CPT | Mod: PBBFAC,,, | Performed by: ORTHOPAEDIC SURGERY

## 2019-02-05 PROCEDURE — 99214 OFFICE O/P EST MOD 30 MIN: CPT | Mod: 57,S$GLB,, | Performed by: ORTHOPAEDIC SURGERY

## 2019-02-05 PROCEDURE — 73565 X-RAY EXAM OF KNEES: CPT | Mod: TC

## 2019-02-05 PROCEDURE — 73565 X-RAY EXAM OF KNEES: CPT | Mod: 26,,, | Performed by: RADIOLOGY

## 2019-02-05 PROCEDURE — 3079F PR MOST RECENT DIASTOLIC BLOOD PRESSURE 80-89 MM HG: ICD-10-PCS | Mod: CPTII,S$GLB,, | Performed by: ORTHOPAEDIC SURGERY

## 2019-02-05 PROCEDURE — 3075F SYST BP GE 130 - 139MM HG: CPT | Mod: CPTII,S$GLB,, | Performed by: ORTHOPAEDIC SURGERY

## 2019-02-05 PROCEDURE — 99999 PR PBB SHADOW E&M-EST. PATIENT-LVL III: ICD-10-PCS | Mod: PBBFAC,,, | Performed by: ORTHOPAEDIC SURGERY

## 2019-02-05 PROCEDURE — 3008F PR BODY MASS INDEX (BMI) DOCUMENTED: ICD-10-PCS | Mod: CPTII,S$GLB,, | Performed by: ORTHOPAEDIC SURGERY

## 2019-02-05 PROCEDURE — 3075F PR MOST RECENT SYSTOLIC BLOOD PRESS GE 130-139MM HG: ICD-10-PCS | Mod: CPTII,S$GLB,, | Performed by: ORTHOPAEDIC SURGERY

## 2019-02-05 PROCEDURE — 3079F DIAST BP 80-89 MM HG: CPT | Mod: CPTII,S$GLB,, | Performed by: ORTHOPAEDIC SURGERY

## 2019-02-05 PROCEDURE — 73565 XR KNEE AP STANDING BILATERAL: ICD-10-PCS | Mod: 26,,, | Performed by: RADIOLOGY

## 2019-02-05 NOTE — LETTER
February 5, 2019      Gretel Faustin, EDUARDO  [address]  [facility] [address]           [facility]  [address]  [facility] [address]  Phone: [phone]          Patient: Keyona Herrera   MR Number: [id number]   YOB: 1955   Date of Visit: 2/5/2019       Dear Gretel Faustin:    Thank you for referring Keyona Herrera to me for evaluation. Attached you will find relevant portions of my assessment and plan of care.    If you have questions, please do not hesitate to call me. I look forward to following Keyona Herrera along with you.    Sincerely,    [facility] MD    Enclosure  CC:  No Recipients    If you would like to receive this communication electronically, please contact [email or web address] or [phone] to request more information on [employer] Link access.    For providers and/or their staff who would like to refer a patient to [facility], please contact us through our one-stop-shop provider referral line, [facility], at [phone].    If you feel you have received this communication in error or would no longer like to receive these types of communications, please e-mail [email or web address]

## 2019-02-05 NOTE — PROGRESS NOTES
HPI:    Keyona Herrera is a 63 y.o. female who is here today for   Chief Complaint   Patient presents with    Left Knee - Pain    Right Knee - Pain    Consult     right knee   .     Duration: 12 months  Intensity: severe  Character of pain: dull  Location: She reports that the pain is predominately  medial  Patient's pain increases with activity.  Pain is increased with weightbearing and interferes with activities of daily living.    She has tried conservative management including NSAIDS, injections, and activity modification without relief.    She has discussed options with her family and wishes to schedule TKA.     PMSFSH reviewed per clinic record       Past Medical History:   Diagnosis Date    HTN (hypertension)     Hyperlipidemia     Obesity           Current Outpatient Medications:     coenzyme Q10 100 mg capsule, Take 100 mg by mouth once daily., Disp: , Rfl:     flaxseed oil 1,000 mg Cap, Take by mouth. 1 Capsule Oral Every day, Disp: , Rfl:     fluticasone (FLONASE) 50 mcg/actuation nasal spray, 1 spray (50 mcg total) by Each Nare route once daily., Disp: 1 Bottle, Rfl: 0    ketorolac (TORADOL) 10 mg tablet, Take 1 tablet (10 mg total) by mouth every 8 (eight) hours as needed for Pain., Disp: 60 tablet, Rfl: 0    meclizine (ANTIVERT) 25 mg tablet, Take 1 tablet (25 mg total) by mouth 3 (three) times daily as needed for Dizziness., Disp: 60 tablet, Rfl: 1    multivitamin capsule, Take by mouth. 1 Capsule Oral Every day, Disp: , Rfl:     omega-3 fatty acids 1,000 mg Cap, Take by mouth. 1 Capsule Oral Every day, Disp: , Rfl:     meloxicam (MOBIC) 7.5 MG tablet, Take 1 tablet (7.5 mg total) by mouth once daily. Take once a day for two weeks then as needed. Take with food, Disp: 30 tablet, Rfl: 0     Review of patient's allergies indicates:  No Known Allergies     ROS  Constitutional: Negative for fever, Negative for weight loss  HENT Negative for congestion  Cardiovascular: Negative chest  pain  Respiratory: Negative Shortness of breath  Heme: Negative excessive bleeding  Skin:NegativeItching, Negative breakdown  Musculoskeletal:Positive for back pain, Positive for joint pain, Negative muscle pain, Negative muscle weakness  Neurological: Negative for numbness and paresthesias   Psychiatric/Behavioral: Negative altered mental status, Negative for depression    Physical Exam:   /85   Pulse 80   Resp 18   Wt 100.4 kg (221 lb 7.2 oz)   BMI 39.23 kg/m²   General appearance: This is a well-developed, Well nourished female No obvious acute distress.  Psychiatric: normal mood and affect;  pleasant and conversant; judgment and thought content normal  Gait is coordinated. Patient has antalgic gait to the right  Cardiovascular: There are no swelling or varicosities present.   Respiratory: normal respiratory effort   Lymphatic: no adenopathy   Neurologic: alert and oriented to person, place, and time   Deep Tendon Reflexes are normal;  Coordination and Balance: Normal   Musculoskeletal   Neck    ROM shows normal flexion and extension and lateral rotation    Palpation: Non-tender    Stability is normal    Strength is normal    Skin is normal without masses and lesions    Sensation is intact to light touch   Back    ROM showsabnormal flexion, extension and     rotation    Palpation shows no masses    Stability is normal    Strength to flexion and extension well maintained    Core strength is diminished    Skin shows no rashes or cafe au lait spots;     Sensation is intact to light touch  Right hip   Range of motion normal    Left Hip  Range of motionnormal    Right Knee  Swelling Mild  TendernessMedial joint line  Range of Motion: 5-110  Motion is painfulYes  Crepitance presentYes    Right Leg  Neurologic Intact  Pulses Intact    Left Knee: Swelling None  TendernessMedial joint line  Range of Motion: 120   Motion is painful No    Left Leg   Neurologic Intact  Pulses Intact    Radiograph: Show severe  degenerative arthritis with subchondral sclerosis, periarticular osteophytes and narrowing of joint space.  Angle: mild varus    Physical therapy is contraindicated due to potential bone loss on this severe arthritic joint.    Assessment:  Knee arthritis right      She will need to be cleared in our PreOp center.         .    She  has a past medical history of HTN (hypertension), Hyperlipidemia, and Obesity. . We will have to take this into account. She  will be followed by the hospitalist service while in the hospital.       We have gone over the hospitalization and recovery with her as well.  This is typically around 2 weeks on a walker and transition to a cane after that.  She will have home health likely for 3-4 weeks and then transition to outpatient if necessary.  She is agreement with this plan of care and is ready to proceed.  I will see her back for clinical recheck at the 2-week postop cheryl.  I will see her back for clinical recheck for any other questions or problems as needed and certainly for any other issues in the interim.    We have discussed risks of total knee replacement which include but are not limited to blood clots in the legs that can travel to the lungs (pulmonary embolism). Pulmonary embolism can cause shortness of breath, chest pain, and even shock. Other risks include urinary tract infection, nausea and vomiting (usually related to pain medication), chronic knee pain and stiffness, bleeding into the knee joint, nerve damage, blood vessel injury, and infection of the knee which can require re-operation. Furthermore, the risks of anesthesia include potential heart, lung, kidney, and liver damage.

## 2019-02-11 ENCOUNTER — TELEPHONE (OUTPATIENT)
Dept: ORTHOPEDICS | Facility: CLINIC | Age: 64
End: 2019-02-11

## 2019-02-11 NOTE — TELEPHONE ENCOUNTER
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 Patient also needs information for her pre-op.    left a message that I didn't forget about her  I 'm working elsewhere   Will call her before I leave at 4 :30

## 2019-02-12 ENCOUNTER — ANESTHESIA EVENT (OUTPATIENT)
Dept: SURGERY | Facility: HOSPITAL | Age: 64
End: 2019-02-12
Payer: COMMERCIAL

## 2019-02-12 DIAGNOSIS — M79.606 PAIN OF LOWER EXTREMITY, UNSPECIFIED LATERALITY: Primary | ICD-10-CM

## 2019-02-12 DIAGNOSIS — Z91.89 AT RISK OF UTI: ICD-10-CM

## 2019-02-12 DIAGNOSIS — M17.9 OSTEOARTHRITIS OF KNEE, UNSPECIFIED (CODE): Primary | ICD-10-CM

## 2019-02-12 NOTE — ANESTHESIA PREPROCEDURE EVALUATION
Anesthesia Assessment: Preoperative EQUATION  Patient Active Problem List   Diagnosis    Hyperlipidemia    Obesity    Primary osteoarthritis of both knees    Primary osteoarthritis of right knee       Planned Procedure: Procedure(s) (LRB):  REPLACEMENT-KNEE-TOTAL (Right)  Requested Anesthesia Type:Femoral Block  Surgeon: John L. [facility] Jr., MD  Service: Orthopedics  Known or anticipated Date of Surgery:3/4/2019    Plan:    Testing:  Hematology Profile, BMP, PT/INR, EKG and UA   Pre-anesthesia  visit       Visit focus: possible regional anesthesia and/or nerve block      Consultation:IM Perioperative Hospitalist     Moraima Wolff RN 02/12/2019 02/12/2019  Keyona Herrera is a 63 y.o., female.    Anesthesia Evaluation    I have reviewed the Patient Summary Reports.    I have reviewed the Nursing Notes.   I have reviewed the Medications.     Review of Systems  Anesthesia Hx:  No problems with previous Anesthesia    Social:  Non-Smoker, No Alcohol Use    Hematology/Oncology:  Hematology Normal   Oncology Normal     EENT/Dental:EENT/Dental Normal   Cardiovascular:   Hypertension (off medication since weight loss- 5-10 years ago) hyperlipidemia Housework, cooks, grocery shopping.  Can climb a flight of stairs. Denies chest pain or sob    Was evaluated in 2015 for chest pain.  Had stress test and echo.  Patient reports she was told everything was normal.    Pulmonary:  Possible Obstructive Sleep Apnea , (STOP/BANG) Symptoms A - Age > 50, P - Pressure being treated for high BP, B - BMI > 35 and N - Neck circumference > 40 cms    Renal/:  Renal/ Normal     Hepatic/GI:   Denies PUD. GERD (rolaids prn. can lie flat) Denies Liver Disease.    Musculoskeletal:  Musculoskeletal General/Symptoms: Functional capacity is ambulatory without assistance. Occasional leg cramps Joint Disease:  Arthritis,  Osteoarthritis    Neurological:   Denies CVA. Denies Seizures.  Pain , onset is chronic , location of knee , quality of sharp, aching/dull , severity is a 5 , precipitating factors are walking and standing too long , alleviating factors are rest, stretching exercise. Osteoarthritis  Denies Peripheral Neuropathy    Endocrine:  Endocrine Normal    Dermatological:  Skin Normal    Psych:  Psychiatric Normal           Physical Exam  General:  Well nourished    Airway/Jaw/Neck:  Airway Findings: Mouth Opening: Small, but > 3cm Tongue: Normal  General Airway Assessment: Adult  Jaw/Neck Findings:  Neck ROM: Normal ROM  Neck Findings:  Girth Increased, Short Neck      Dental:  Dental Findings: (upper left bridge)        Mental Status:  Mental Status Findings:  Cooperative, Alert and Oriented         Anesthesia Plan  Type of Anesthesia, risks & benefits discussed:  Anesthesia Type:  general, MAC, regional, spinal, epidural  Patient's Preference:   Intra-op Monitoring Plan:   Intra-op Monitoring Plan Comments:   Post Op Pain Control Plan:   Post Op Pain Control Plan Comments:   Induction:   IV  Beta Blocker:  Patient is not currently on a Beta-Blocker (No further documentation required).       Informed Consent: Patient understands risks and agrees with Anesthesia plan.  Questions answered. Anesthesia consent signed with patient.  ASA Score: 1     Day of Surgery Review of History & Physical:    H&P update referred to the surgeon.         Ready For Surgery From Anesthesia Perspective.      Labs and ekg reviewed, UA-occult blood 2+-reviewed by Dr. Billings.     Discharge plans:  Lalo [phone] and daughter Margaret Hess [phone]    Please refer to , Internal Medicine, perioperative risk assessment and recommendations.2/26     Moraima Wolff, RN 02/20/2019

## 2019-02-12 NOTE — PRE ADMISSION SCREENING
Anesthesia Assessment: Preoperative EQUATION    Planned Procedure: Procedure(s) (LRB):  REPLACEMENT-KNEE-TOTAL (Right)  Requested Anesthesia Type:Femoral Block  Surgeon: [facility] L. [facility] Jr., MD  Service: Orthopedics  Known or anticipated Date of Surgery:3/4/2019    Plan:    Testing:  Hematology Profile, BMP, PT/INR, EKG and UA   Pre-anesthesia  visit       Visit focus: possible regional anesthesia and/or nerve block      Consultation:IM Perioperative Hospitalist     Moraima Wolff RN 02/12/2019

## 2019-02-13 ENCOUNTER — TELEPHONE (OUTPATIENT)
Dept: ORTHOPEDICS | Facility: CLINIC | Age: 64
End: 2019-02-13

## 2019-02-13 NOTE — TELEPHONE ENCOUNTER
We spoke  I went over all of her appointments coming up  I mailed the ortho, appts separately   She  Said she understood

## 2019-02-20 ENCOUNTER — HOSPITAL ENCOUNTER (OUTPATIENT)
Dept: CARDIOLOGY | Facility: CLINIC | Age: 64
Discharge: HOME | End: 2019-02-20
Payer: COMMERCIAL

## 2019-02-20 ENCOUNTER — HOSPITAL ENCOUNTER (OUTPATIENT)
Dept: PREADMISSION TESTING | Facility: HOSPITAL | Age: 64
Discharge: HOME | End: 2019-02-20
Attending: ANESTHESIOLOGY
Payer: COMMERCIAL

## 2019-02-20 VITALS
OXYGEN SATURATION: 98 % | BODY MASS INDEX: 38.87 KG/M2 | TEMPERATURE: 99 F | HEIGHT: 63 IN | SYSTOLIC BLOOD PRESSURE: 144 MMHG | HEART RATE: 81 BPM | DIASTOLIC BLOOD PRESSURE: 74 MMHG | WEIGHT: 219.38 LBS

## 2019-02-20 DIAGNOSIS — M79.606 PAIN OF LOWER EXTREMITY, UNSPECIFIED LATERALITY: ICD-10-CM

## 2019-02-20 PROCEDURE — 93000 EKG 12-LEAD: ICD-10-PCS | Mod: S$GLB,,, | Performed by: INTERNAL MEDICINE

## 2019-02-20 PROCEDURE — 93000 ELECTROCARDIOGRAM COMPLETE: CPT | Mod: S$GLB,,, | Performed by: INTERNAL MEDICINE

## 2019-02-20 RX ORDER — BROMPHENIRAMINE MALEATE, DEXTROMETHORPHAN HBR, PHENYLEPHRINE HCL, DIPHENHYDRAMINE HCL, PHENYLEPHRINE HCL 0.52G
0.52 KIT ORAL DAILY
COMMUNITY
End: 2022-06-03

## 2019-02-20 RX ORDER — IBUPROFEN 200 MG
200 TABLET ORAL EVERY 6 HOURS PRN
COMMUNITY
End: 2019-08-20

## 2019-02-20 RX ORDER — ASPIRIN 81 MG/1
81 TABLET ORAL EVERY OTHER DAY
Status: ON HOLD | COMMUNITY
End: 2019-03-04 | Stop reason: HOSPADM

## 2019-02-20 NOTE — DISCHARGE INSTRUCTIONS
Your surgery has been scheduled for:__________________________________________    You should report to:  ____[facility], located on the [place] side of the first floor of the           [facility] ([phone])  ____[facility], located on the [facility] side of the            Second floor of the [facility] ([phone])  ____3rd Floor [facility] located on the [facility] side of the [facility] [phone]  Please Note   - Tell your doctor if you take Aspirin, products containing Aspirin, herbal medications  or blood thinners, such as Coumadin, Ticlid, or Plavix.  (Consult your provider regarding holding or stopping before surgery).  - Arrange for someone to drive you home following surgery.  You will not be allowed to leave the surgical facility alone or drive yourself home following sedation and anesthesia.  Before Surgery  - Stop taking all herbal medications 14days prior to surgery  - No Motrin/Advil (Ibuprofen) 7 days before surgery  - No Aleve (Naproxen) 7 days before surgery  - No Goody's/BC powder 7 days before surgery  - Stop Taking Asprin, products containing Asprin _____days before surgery  - Stop taking blood thinners_______days before surgery  - Refrain from drinking alcoholic beverages for 24hours before and after surgery  - Stop or limit smoking _________days before surgery  - You may take Tylenol for pain  Night before Surgery  - Take a shower or bath (shower is recommended).  Bathe with Hibiclens soap or an antibacterial soap from the neck down.  If not supplied by your surgeon, hibiclens soap will need to be purchased over the counter in pharmacy.  Rinse soap off thoroughly.  - Shampoo your hair with your regular shampoo                           Food and Beverage Instructions  1. Stop ALL solid food, gum, candy (including vitamins) 8 hours before surgery/procedure time.  2. The patient should be  ENCOURAGED to drink carbohydrate-rich clear liquids (sports drinks, clear juices) until 2 hours prior to surgery/procedure time.  3. CLEAR liquids include only water, black coffee NO creamer, clear oral rehydration drinks, clear sports drinks or clear fruit juices (no orange juice, no pulpy juices, no apple cider). Advise patients if they can read newsprint through the liquid, it qualifies as clear liquid.   4. IF IN DOUBT, drink water instead.   5. NOTHING  TO DRINK 2 hours before to arrival for surgery/procedure time. If you are told to take medication on the morning of surgery, it may be taken with a sip of water.     FOLLOW YOUR SURGEON'S INSTRUCTIONS REGARDING FOOD AND BEVERAGES IF DIFFERENT THAN ABOVE INSTRUCTIONS.    The Day of Surgery  - Take another bath or shower with hibiclens or any antibacterial soap, to reduce the chance of infection.  - Take heart and blood pressure medications with a small sip of water, as advised by the perioperative team.  - Do not take fluid pills  - You may brush your teeth and rinse your mouth, but do not swall any additional water.   - Do not apply perfumes, powder, body lotions or deodorant on the day of surgery.  - Nail polish should be removed.  - Do not wear makeup or moisturizer  - Wear comfortable clothes, such as a button front shirt and loose fitting pants.  - Leave all jewelry, including body piercings, and valuables at home.    - Bring any devices you will neeed after surgery such as crutches or canes.  - If you have sleep apnea, please bring your CPAP machine  In the event that your physical condition changes including the onset of a cold or respiratory illness, or if you have to delay or cancel your surgery, please notify your surgeon.    Anesthesia: Regional Anesthesia    Youre scheduled for surgery. During surgery, youll receive medicine called anesthesia to keep you comfortable and pain-free. Your surgeon has decided that youll receive regional anesthesia.  This sheet tells you what to expect with this type of anesthesia.  What is regional anesthesia?  Regional anesthesia numbs one region of your body. The anesthesia may be given around nerves or into veins in your arms, neck, or legs (nerve block or [place] block). Or it may be sent into the spinal fluid (spinal anesthesia) or into the space just outside the spinal fluid (epidural anesthesia). You may also be given sedatives to help you relax.  Nerve block or [place] block  A small area of the body, such as an arm or leg, can be numbed using a nerve block or Beatriz block.  · Nerve block. During a nerve block, your skin is numbed. A needle is then inserted near nerves that serve the area to be numbed. Anesthetic is sent through the needle.  · IV regional or [place] block. For this type of block, an IV line is put into a vein. The blood flow to the area to be numbed is blocked for a short time. Anesthetic is sent through the IV.  Spinal anesthesia  Spinal anesthesia numbs your body from about the waist down.  · Anesthetic is injected into the spinal fluid. This is a substance that surrounds the spinal cord in your spinal column. The anesthetic blocks pain traveling from the body to the brain.  · To receive the anesthetic, your skin is numbed at the injection site on your back.  · A needle is then inserted into the spinal space. Anesthetic is sent into the spinal fluid through the needle.  Epidural anesthesia  Epidural anesthesia is most commonly used during childbirth and may also be used after surgical procedures of the chest, belly, and legs.  · Anesthetic is injected into the epidural space. This is just outside the dural sac which contains the spinal fluid.  · To receive the anesthetic, your skin is numbed at the injection site on your back.  · A needle is then inserted into the epidural space. Anesthetic is sent into the epidural space through the needle.  · A small flexible catheter may be attached to the needle and left in  place. This allows for continuous injections or infusions of anesthetic.  Anesthesia tools and medicines that might be near you during your procedure  · Local anesthetic. This medicine is given through a needle numbs one region of your body.  · Electrocardiography leads (electrodes). These are used to record your heart rate and rhythm.  · Blood pressure cuff. A cuff is placed on your arm to keep track of your blood pressure.  · Pulse oximeter. This small clip is placed on the end of the finger. It measures your blood oxygen level.  · Sedatives. These medicines may be given through an IV. They help to relax you and keep you comfortable. You may stay awake or sleep lightly.  · Oxygen. You may be given oxygen through a facemask.  Risks and possible complications  Regional anesthesia carries some risks. These include:  · Nausea and vomiting  · Headache  · Backache  · Decreased blood pressure  · Allergic reaction to the anesthetic  · Ongoing numbness (rare)  · Irregular heartbeat (rare)  · Cardiac arrest (rare)   Date Last Reviewed: 12/1/2016  © 7522-6077 [employer] [place]. [address] [place] [address]. All rights reserved. This information is not intended as a substitute for professional medical care. Always follow your healthcare professional's instructions.

## 2019-02-26 ENCOUNTER — HOSPITAL ENCOUNTER (OUTPATIENT)
Dept: RADIOLOGY | Facility: HOSPITAL | Age: 64
Discharge: HOME | End: 2019-02-26
Attending: PHYSICIAN ASSISTANT
Payer: COMMERCIAL

## 2019-02-26 ENCOUNTER — INITIAL CONSULT (OUTPATIENT)
Dept: INTERNAL MEDICINE | Facility: CLINIC | Age: 64
End: 2019-02-26
Payer: COMMERCIAL

## 2019-02-26 ENCOUNTER — OFFICE VISIT (OUTPATIENT)
Dept: ORTHOPEDICS | Facility: CLINIC | Age: 64
End: 2019-02-26
Payer: COMMERCIAL

## 2019-02-26 VITALS
HEIGHT: 63 IN | SYSTOLIC BLOOD PRESSURE: 133 MMHG | OXYGEN SATURATION: 98 % | DIASTOLIC BLOOD PRESSURE: 84 MMHG | BODY MASS INDEX: 38.98 KG/M2 | TEMPERATURE: 98 F | WEIGHT: 220 LBS | RESPIRATION RATE: 18 BRPM | HEART RATE: 84 BPM

## 2019-02-26 VITALS — BODY MASS INDEX: 38.98 KG/M2 | WEIGHT: 220 LBS | HEIGHT: 63 IN

## 2019-02-26 DIAGNOSIS — M25.561 RIGHT KNEE PAIN, UNSPECIFIED CHRONICITY: ICD-10-CM

## 2019-02-26 DIAGNOSIS — E66.9 CLASS 2 OBESITY WITH BODY MASS INDEX (BMI) OF 38.0 TO 38.9 IN ADULT, UNSPECIFIED OBESITY TYPE, UNSPECIFIED WHETHER SERIOUS COMORBIDITY PRESENT: ICD-10-CM

## 2019-02-26 DIAGNOSIS — M17.0 PRIMARY OSTEOARTHRITIS OF BOTH KNEES: ICD-10-CM

## 2019-02-26 DIAGNOSIS — Z01.818 PREOP EXAMINATION: Primary | ICD-10-CM

## 2019-02-26 DIAGNOSIS — M17.11 PRIMARY OSTEOARTHRITIS OF RIGHT KNEE: Primary | ICD-10-CM

## 2019-02-26 DIAGNOSIS — M25.561 RIGHT KNEE PAIN, UNSPECIFIED CHRONICITY: Primary | ICD-10-CM

## 2019-02-26 DIAGNOSIS — E78.5 HYPERLIPIDEMIA, UNSPECIFIED HYPERLIPIDEMIA TYPE: ICD-10-CM

## 2019-02-26 PROCEDURE — 99244 OFF/OP CNSLTJ NEW/EST MOD 40: CPT | Mod: S$GLB,,, | Performed by: HOSPITALIST

## 2019-02-26 PROCEDURE — 73560 XR KNEE 1 OR 2 VIEW RIGHT: ICD-10-PCS | Mod: 26,RT,, | Performed by: RADIOLOGY

## 2019-02-26 PROCEDURE — 99999 PR PBB SHADOW E&M-EST. PATIENT-LVL III: CPT | Mod: PBBFAC,,, | Performed by: HOSPITALIST

## 2019-02-26 PROCEDURE — 99499 NO LOS: ICD-10-PCS | Mod: S$GLB,,, | Performed by: PHYSICIAN ASSISTANT

## 2019-02-26 PROCEDURE — 99999 PR PBB SHADOW E&M-EST. PATIENT-LVL III: ICD-10-PCS | Mod: PBBFAC,,, | Performed by: PHYSICIAN ASSISTANT

## 2019-02-26 PROCEDURE — 99499 UNLISTED E&M SERVICE: CPT | Mod: S$GLB,,, | Performed by: PHYSICIAN ASSISTANT

## 2019-02-26 PROCEDURE — 99244 PR OFFICE CONSULTATION,LEVEL IV: ICD-10-PCS | Mod: S$GLB,,, | Performed by: HOSPITALIST

## 2019-02-26 PROCEDURE — 99999 PR PBB SHADOW E&M-EST. PATIENT-LVL III: CPT | Mod: PBBFAC,,, | Performed by: PHYSICIAN ASSISTANT

## 2019-02-26 PROCEDURE — 73560 X-RAY EXAM OF KNEE 1 OR 2: CPT | Mod: TC,RT

## 2019-02-26 PROCEDURE — 73560 X-RAY EXAM OF KNEE 1 OR 2: CPT | Mod: 26,RT,, | Performed by: RADIOLOGY

## 2019-02-26 PROCEDURE — 99999 PR PBB SHADOW E&M-EST. PATIENT-LVL III: ICD-10-PCS | Mod: PBBFAC,,, | Performed by: HOSPITALIST

## 2019-02-26 NOTE — PROGRESS NOTES
[facility] - Pre Op Consult  Progress Note    Patient Name: Keyona Herrera  MRN: [id number]  Date of Evaluation- 02/26/2019  PCP- Cher Palomo MD    Future cases for Keyona Herrera [[id number]]     Case ID Status Date Time Phillip Procedure Provider Location    [id number] [facility] 3/4/2019  1:30  REPLACEMENT-KNEE-TOTAL [facility]. [facility] Jr., MD [[id number]] [facility] OR 2ND FLR      Rt    HPI:  History of present illness- I had the pleasure of meeting this pleasant 63 y.o. lady in the pre op clinic prior to her elective Orthopedic surgery. The patient is new to me . Keyona was accompanied by  Lalo, Daughter Margaret.  Goes by Joseline    I have obtained the history by speaking to the patient and by reviewing the electronic health records.    Events leading up to surgery / History of presenting illness -    She has been troubled with moderate Rt knee pain for about 6-7 months . Pain increases with activity and decreases with resting.    Relevant health conditions of significance for the perioperative period/ History of presenting illness -    Subjectively describes health as good    Not known to have heart disease , Diabetes Mellitus, Lung disease , HTN    Lives with    Works as a  for [employer]     With regards to Physical activity , reduced lately - due to Rt knee pain  During the weekends , does house work, garden work  Lives in a 2 story house with up stairs bed room ( about 10-11 steps )             Subjective/ Objective:          Chief complaint-Preoperative evaluation, Perioperative Medical management, complication reduction plan     Active cardiac conditions- none    Revised cardiac risk index predictors- none    Functional capacity -Examples of physical activity  house work and can take 1 flight of stairs----- She can undertake all the above activities without  chest pain,chest tightness, Shortness of breath ,dizziness,lightheadedness making her exercise tolerance more  than 4  "Mets.   Tiredness on exertion, on occasions not new ,stable over time - possibly attributes to weight     Review of Systems   Constitutional: Negative for chills and fever.        No unusual weight changes       HENT:        STOPBANG score 3 / 8      BMI> 35   Age over 50   Neck size over 40 CM     Eyes:        No new visual changes   Respiratory:        No cough , phlegm    No Hemoptysis   Cardiovascular:        As noted   Gastrointestinal:        No overt GI/ blood losses  Bowel movements- Regular   Gets colonoscopies    Endocrine:        Prednisone use > 20 mg daily for 3 weeks- None   Genitourinary: Negative for dysuria.        No urinary hesitancy    Musculoskeletal:        As above      Skin: Negative for rash.   Neurological: Negative for syncope.        No unilateral weakness   Hematological:          Aspirin use for preventive reasons    Psychiatric/Behavioral:        No Depression,Anxiety       No vascular stenting   No past medical history pertinent negatives.        No anesthesia, bleeding, cardiac problems , PONV with previous surgeries/procedures.  Medications and Allergies reviewed in epic.   FH- No anesthesia,bleeding / venous thrombosis , early onset heart disease in family   Family help post op     Physical Exam  Blood pressure 133/84, pulse 84, temperature 98 °F (36.7 °C), temperature source Oral, resp. rate 18, height 5' 3" (1.6 m), weight 99.8 kg (220 lb), SpO2 98 %.      Physical Exam  Constitutional- Vitals - Body mass index is 38.97 kg/m².,   Vitals:    02/26/19 0805   BP: 133/84   Pulse: 84   Resp:    Temp:      General appearance-Conscious,Coherent  Eyes- No conjunctival icterus,pupils  round  and reactive to light   ENT-Oral cavity- moist  , Hearing grossly normal   Neck- No thyromegaly ,Trachea -central, No jugular venous distension,   No Carotid Bruit   Cardiovascular -Heart Sounds- Normal  and  no murmur   , No gallop rhythm   Respiratory - Normal Respiratory Effort, Normal breath " sounds,  no wheeze  and  no forced expiratory wheeze    Peripheral pitting pedal edema-- none , no calf pain   Gastrointestinal -Soft abdomen, No palpable masses, Non Tender,Liver,Spleen not palpable. No-- free fluid and shifting dullness  Musculoskeletal- No finger Clubbing. Strength grossly normal   Lymphatic-No Palpable cervical, axillary,Inguinal lymphadenopathy   Psychiatric - normal effect,Orientation  Rt Dorsalis pedis pulses-palpable    Lt Dorsalis pedis pulses- palpable   Rt Posterior tibial pulses -palpable   Left posterior tibial pulses -palpable   Miscellaneous -  no renal bruit  Investigations  Lab and Imaging have been reviewed in [facility].    Review of Medicine tests    EKG- I had independently reviewed the EKG from--2/20/2019   It was reported to be showing     Normal sinus rhythm  Left anterior fascicular block  Lateral infarct (cited on or before 25-NOV-2014)  Abnormal ECG  When compared with ECG of 24-NOV-2015 14:42,  No significant change was found    May 2014     NORMAL MYOCARDIAL PERFUSION  1. The perfusion scan is free of evidence for myocardial ischemia or injury.   2. Resting wall motion is physiologic.   3. Resting LV function is normal.   4. The ventricular volumes are normal at rest and stress.   5. The extracardiac distribution of radioactivity is normal.     May 2014     1 - Hyperdynamic left ventricular systolic function (EF >65%).     2 - Trivial mitral regurgitation.     3 - Trivial tricuspid regurgitation.     Review of clinical lab tests:  Lab Results   Component Value Date    CREATININE 0.7 02/20/2019    HGB 14.5 02/20/2019     02/20/2019           Review of old records- Was done and information gathered regards to events leading to surgery and health conditions of significance in the perioperative period.        Preoperative cardiac risk assessment-  The patient does not have any active cardiac conditions . Revised cardiac risk index predictors-0 ---.Functional capacity is  more than 4 Mets. She will be undergoing a Orthopedic procedure that carries a intermediate risk     The estimated risk of the rate of adverse cardiac outcomes  0.4%    No further cardiac work up is indicated prior to proceeding with the surgery          American Society of Anesthesiologists Physical status classification ( ASA ) class: 3     Postoperative pulmonary complication risk assessment:      ARISCAT ( [place]) risk index- risk class -  Low, if duration of surgery is under 3 hours, intermediate, if duration of surgery is over 3 hours          Assessment/Plan:     Hyperlipidemia  Currently not able to exercise due to Rt knee problem   Hopefully likely to be more active after knee replacement   Tries to follow diet    Obesity  Gained weight since 2012 , since changed job to a  ( reduced physical activity )   Also less mobile from rt knee   Not known to  have sleep apnea , diabetes , fatty liver   reports mild snoring , but no apnea-  Encouraged weight loss    Primary osteoarthritis of both knees  Planning on rt knee replacement   She is seeing Orthopedics today - going to clarify Total versus partial         Preventive perioperative care    Thromboembolic prophylaxis:  Her risk factors for thrombosis include obesity, surgical procedure and age.I suggest  thromboembolic prophylaxis ( mechanical/pharmacological, weighing the risk benefits of pharmacological agent use considering amie procedural bleeding )  during the perioperative period.I suggested being active in the post operative period. The patient is a candidate for extended DVT prophylaxis     Postoperative pulmonary complication prophylaxis-Risk factors for post operative pulmonary complications include ASA class >2- I suggest incentive spirometry use, early ambulation and end tidal carbon dioxide monitoring  , oral care , head end of bed elevation      Renal complication prophylaxis- I suggest keeping her well hydrated .I suggested  drinking 2 litre's of water a day      Surgical site Infection Prophylaxis-I  suggest appropriate antibiotic for Prophylaxis against Surgical site infections        In view of regional anesthesia  the patient  is at risk of postoperative urinary retention.  I suggest avoidance / minimizing the of  Benzodiazepines,Anticholinergic medication,antihistamines ( Benadryl) , if possible in the perioperative period. I suggest using the minimum possible use of opioids for the minimum period of time in the perioperative period. Benadryl avoidance suggested      This visit was focused on Preoperative evaluation, Perioperative Medical management, complication reduction plans. I suggest that the patient follows up with primary care or relevant sub specialists for ongoing health care.    I appreciate the opportunity to be involved in this patients care. Please feel free to contact me if there were any questions about this consultation.    Patient is optimized     Patient  was instructed to call and update me about any changes to health,  medication, office visits ,testing out side of the [facility] operative care center , hospitalizations between now and surgery     Cheryl Billings MD  Perioperative Medicine  [facility]   Pager [phone]  -  Had chest discomfort in the past   Had Cardiac evaluation   History sounds musculoskeletal in nature   No longer having chest discomfort   ---  2/26- 16 55     UA from 2/26 showed no UTI

## 2019-02-26 NOTE — HPI
History of present illness- I had the pleasure of meeting this pleasant 63 y.o. lady in the pre op clinic prior to her elective Orthopedic surgery. The patient is new to me . Keyona was accompanied by  Lalo, Daughter Margaret.  Goes by Joseline    I have obtained the history by speaking to the patient and by reviewing the electronic health records.    Events leading up to surgery / History of presenting illness -    She has been troubled with moderate Rt knee pain for about 6-7 months . Pain increases with activity and decreases with resting.    Relevant health conditions of significance for the perioperative period/ History of presenting illness -    Subjectively describes health as good    Not known to have heart disease , Diabetes Mellitus, Lung disease , HTN    Lives with    Works as a  for [employer]     With regards to Physical activity , reduced lately - due to Rt knee pain  During the weekends , does house work, garden work  Lives in a 2 story house with up stairs bed room ( about 10-11 steps )

## 2019-02-26 NOTE — ASSESSMENT & PLAN NOTE
Planning on rt knee replacement   She is seeing Orthopedics today - going to clarify Total versus partial

## 2019-02-26 NOTE — ASSESSMENT & PLAN NOTE
Currently not able to exercise due to Rt knee problem   Hopefully likely to be more active after knee replacement   Tries to follow diet

## 2019-02-26 NOTE — OUTPATIENT SUBJECTIVE & OBJECTIVE
"Outpatient Subjective & Objective     Chief complaint-Preoperative evaluation, Perioperative Medical management, complication reduction plan     Active cardiac conditions- none    Revised cardiac risk index predictors- none    Functional capacity -Examples of physical activity  house work and can take 1 flight of stairs----- She can undertake all the above activities without  chest pain,chest tightness, Shortness of breath ,dizziness,lightheadedness making her exercise tolerance more  than 4 Mets.   Tiredness on exertion, on occasions not new ,stable over time - possibly attributes to weight     Review of Systems   Constitutional: Negative for chills and fever.        No unusual weight changes       HENT:        STOPBANG score 3 / 8      BMI> 35   Age over 50   Neck size over 40 CM     Eyes:        No new visual changes   Respiratory:        No cough , phlegm    No Hemoptysis   Cardiovascular:        As noted   Gastrointestinal:        No overt GI/ blood losses  Bowel movements- Regular   Gets colonoscopies    Endocrine:        Prednisone use > 20 mg daily for 3 weeks- None   Genitourinary: Negative for dysuria.        No urinary hesitancy    Musculoskeletal:        As above      Skin: Negative for rash.   Neurological: Negative for syncope.        No unilateral weakness   Hematological:          Aspirin use for preventive reasons    Psychiatric/Behavioral:        No Depression,Anxiety       No vascular stenting   No past medical history pertinent negatives.        No anesthesia, bleeding, cardiac problems , PONV with previous surgeries/procedures.  Medications and Allergies reviewed in epic.   FH- No anesthesia,bleeding / venous thrombosis , early onset heart disease in family   Family help post op     Physical Exam  Blood pressure 133/84, pulse 84, temperature 98 °F (36.7 °C), temperature source Oral, resp. rate 18, height 5' 3" (1.6 m), weight 99.8 kg (220 lb), SpO2 98 %.      Physical Exam  Constitutional- " Vitals - Body mass index is 38.97 kg/m².,   Vitals:    02/26/19 0805   BP: 133/84   Pulse: 84   Resp:    Temp:      General appearance-Conscious,Coherent  Eyes- No conjunctival icterus,pupils  round  and reactive to light   ENT-Oral cavity- moist  , Hearing grossly normal   Neck- No thyromegaly ,Trachea -central, No jugular venous distension,   No Carotid Bruit   Cardiovascular -Heart Sounds- Normal  and  no murmur   , No gallop rhythm   Respiratory - Normal Respiratory Effort, Normal breath sounds,  no wheeze  and  no forced expiratory wheeze    Peripheral pitting pedal edema-- none , no calf pain   Gastrointestinal -Soft abdomen, No palpable masses, Non Tender,Liver,Spleen not palpable. No-- free fluid and shifting dullness  Musculoskeletal- No finger Clubbing. Strength grossly normal   Lymphatic-No Palpable cervical, axillary,Inguinal lymphadenopathy   Psychiatric - normal effect,Orientation  Rt Dorsalis pedis pulses-palpable    Lt Dorsalis pedis pulses- palpable   Rt Posterior tibial pulses -palpable   Left posterior tibial pulses -palpable   Miscellaneous -  no renal bruit  Investigations  Lab and Imaging have been reviewed in epic.    Review of Medicine tests    EKG- I had independently reviewed the EKG from--2/20/2019   It was reported to be showing     Normal sinus rhythm  Left anterior fascicular block  Lateral infarct (cited on or before 25-NOV-2014)  Abnormal ECG  When compared with ECG of 24-NOV-2015 14:42,  No significant change was found    May 2014     NORMAL MYOCARDIAL PERFUSION  1. The perfusion scan is free of evidence for myocardial ischemia or injury.   2. Resting wall motion is physiologic.   3. Resting LV function is normal.   4. The ventricular volumes are normal at rest and stress.   5. The extracardiac distribution of radioactivity is normal.     May 2014     1 - Hyperdynamic left ventricular systolic function (EF >65%).     2 - Trivial mitral regurgitation.     3 - Trivial tricuspid  regurgitation.     Review of clinical lab tests:  Lab Results   Component Value Date    CREATININE 0.7 02/20/2019    HGB 14.5 02/20/2019     02/20/2019           Review of old records- Was done and information gathered regards to events leading to surgery and health conditions of significance in the perioperative period.    Outpatient Subjective & Objective

## 2019-02-26 NOTE — ASSESSMENT & PLAN NOTE
Gained weight since 2012 , since changed job to a  ( reduced physical activity )   Also less mobile from rt knee   Not known to  have sleep apnea , diabetes , fatty liver   reports mild snoring , but no apnea-  Encouraged weight loss

## 2019-02-26 NOTE — LETTER
February 26, 2019      Rhonda G Leopold, MD  [address]  [place] [address]           [facility]  [address] [facility] [address]  Phone: [phone]          Patient: Keyona Herrera   MR Number: [id number]   YOB: 1955   Date of Visit: 2/26/2019       Dear Dr. Rhonda G Leopold:    Thank you for referring Keyona Herrera to me for evaluation. Attached you will find relevant portions of my assessment and plan of care.    If you have questions, please do not hesitate to call me. I look forward to following Keyona Herrera along with you.    Sincerely,    Cheryl Billings MD    Enclosure  CC:  No Recipients    If you would like to receive this communication electronically, please contact [email or web address] or [phone] to request more information on [employer] access.    For providers and/or their staff who would like to refer a patient to [facility], please contact us through our one-stop-shop provider referral line, [facility], at [phone].    If you feel you have received this communication in error or would no longer like to receive these types of communications, please e-mail [email or web address]

## 2019-02-27 RX ORDER — ONDANSETRON 2 MG/ML
4 INJECTION INTRAMUSCULAR; INTRAVENOUS EVERY 8 HOURS PRN
Status: CANCELLED | OUTPATIENT
Start: 2019-02-27

## 2019-02-27 RX ORDER — PREGABALIN 25 MG/1
150 CAPSULE ORAL NIGHTLY
Status: CANCELLED | OUTPATIENT
Start: 2019-02-27

## 2019-02-27 RX ORDER — SODIUM CHLORIDE 0.9 % (FLUSH) 0.9 %
3 SYRINGE (ML) INJECTION EVERY 8 HOURS PRN
Status: CANCELLED | OUTPATIENT
Start: 2019-02-27

## 2019-02-27 RX ORDER — OXYCODONE HYDROCHLORIDE 5 MG/1
15 TABLET ORAL
Status: CANCELLED | OUTPATIENT
Start: 2019-02-27

## 2019-02-27 RX ORDER — PREGABALIN 25 MG/1
150 CAPSULE ORAL
Status: CANCELLED | OUTPATIENT
Start: 2019-02-27

## 2019-02-27 RX ORDER — MUPIROCIN 20 MG/G
1 OINTMENT TOPICAL 2 TIMES DAILY
Status: CANCELLED | OUTPATIENT
Start: 2019-02-27 | End: 2019-03-04

## 2019-02-27 RX ORDER — MUPIROCIN 20 MG/G
1 OINTMENT TOPICAL
Status: CANCELLED | OUTPATIENT
Start: 2019-02-27

## 2019-02-27 RX ORDER — CELECOXIB 100 MG/1
200 CAPSULE ORAL DAILY
Status: CANCELLED | OUTPATIENT
Start: 2019-02-27

## 2019-02-27 RX ORDER — FENTANYL CITRATE 50 UG/ML
25 INJECTION, SOLUTION INTRAMUSCULAR; INTRAVENOUS EVERY 5 MIN PRN
Status: CANCELLED | OUTPATIENT
Start: 2019-02-27

## 2019-02-27 RX ORDER — ROPIVACAINE HYDROCHLORIDE 2 MG/ML
8 INJECTION, SOLUTION EPIDURAL; INFILTRATION; PERINEURAL CONTINUOUS
Status: CANCELLED | OUTPATIENT
Start: 2019-02-27

## 2019-02-27 RX ORDER — LIDOCAINE HYDROCHLORIDE 10 MG/ML
1 INJECTION, SOLUTION EPIDURAL; INFILTRATION; INTRACAUDAL; PERINEURAL
Status: CANCELLED | OUTPATIENT
Start: 2019-02-27

## 2019-02-27 RX ORDER — OXYCODONE HYDROCHLORIDE 5 MG/1
10 TABLET ORAL
Status: CANCELLED | OUTPATIENT
Start: 2019-02-27

## 2019-02-27 RX ORDER — MIDAZOLAM HYDROCHLORIDE 5 MG/ML
1 INJECTION INTRAMUSCULAR; INTRAVENOUS EVERY 5 MIN PRN
Status: CANCELLED | OUTPATIENT
Start: 2019-02-27

## 2019-02-27 RX ORDER — MORPHINE SULFATE 10 MG/ML
2 INJECTION, SOLUTION INTRAMUSCULAR; INTRAVENOUS
Status: CANCELLED | OUTPATIENT
Start: 2019-02-27

## 2019-02-27 RX ORDER — SODIUM CHLORIDE 9 MG/ML
INJECTION, SOLUTION INTRAVENOUS CONTINUOUS
Status: CANCELLED | OUTPATIENT
Start: 2019-02-27 | End: 2019-02-28

## 2019-02-27 RX ORDER — BISACODYL 10 MG
10 SUPPOSITORY, RECTAL RECTAL EVERY 12 HOURS PRN
Status: CANCELLED | OUTPATIENT
Start: 2019-02-27

## 2019-02-27 RX ORDER — ACETAMINOPHEN 10 MG/ML
1000 INJECTION, SOLUTION INTRAVENOUS ONCE
Status: CANCELLED | OUTPATIENT
Start: 2019-02-27 | End: 2019-02-27

## 2019-02-27 RX ORDER — RAMELTEON 8 MG/1
8 TABLET ORAL NIGHTLY PRN
Status: CANCELLED | OUTPATIENT
Start: 2019-02-27

## 2019-02-27 RX ORDER — SODIUM CHLORIDE 9 MG/ML
INJECTION, SOLUTION INTRAVENOUS
Status: CANCELLED | OUTPATIENT
Start: 2019-02-27

## 2019-02-27 RX ORDER — NALOXONE HCL 0.4 MG/ML
0.02 VIAL (ML) INJECTION
Status: CANCELLED | OUTPATIENT
Start: 2019-02-27

## 2019-02-27 RX ORDER — ASPIRIN 81 MG/1
81 TABLET ORAL 2 TIMES DAILY
Status: CANCELLED | OUTPATIENT
Start: 2019-02-27

## 2019-02-27 RX ORDER — POLYETHYLENE GLYCOL 3350 17 G/17G
17 POWDER, FOR SOLUTION ORAL DAILY
Status: CANCELLED | OUTPATIENT
Start: 2019-02-27

## 2019-02-27 RX ORDER — OXYCODONE HYDROCHLORIDE 5 MG/1
5 TABLET ORAL
Status: CANCELLED | OUTPATIENT
Start: 2019-02-27

## 2019-02-27 RX ORDER — ACETAMINOPHEN 500 MG
1000 TABLET ORAL EVERY 6 HOURS
Status: CANCELLED | OUTPATIENT
Start: 2019-02-27 | End: 2019-03-01

## 2019-02-27 RX ORDER — CELECOXIB 100 MG/1
400 CAPSULE ORAL
Status: CANCELLED | OUTPATIENT
Start: 2019-02-27

## 2019-02-27 RX ORDER — FAMOTIDINE 20 MG/1
20 TABLET, FILM COATED ORAL 2 TIMES DAILY
Status: CANCELLED | OUTPATIENT
Start: 2019-02-27

## 2019-02-27 RX ORDER — AMOXICILLIN 250 MG
1 CAPSULE ORAL 2 TIMES DAILY
Status: CANCELLED | OUTPATIENT
Start: 2019-02-27

## 2019-02-27 NOTE — H&P (VIEW-ONLY)
CC: Right knee pain    Keyona Herrera is a 63 y.o. female with 6 month history of Right knee pain. Pain is worse with activity and weight bearing.  Patient has experienced interference of activities of daily living due to decreased range of motion and an increase in joint pain and swelling.  Patient has failed non-operative treatment including NSAIDs, corticosteroid injections, viscosupplement injections, and activity modification.  Keyona Herrera currently ambulates independently.     Relevant medical conditions of significance in perioperative period:  Obesity: BMI ~39. Pt attempting to lose weight but knee pain making it difficult to be active.    Past Medical History:   Diagnosis Date    HTN (hypertension)     Hyperlipidemia     Obesity        Past Surgical History:   Procedure Laterality Date     SECTION      2    CHOLECYSTECTOMY      COLONOSCOPY N/A 2016    [facility]    HYSTERECTOMY      INCISIONAL BREAST BIOPSY      surgery on foot         Family History   Problem Relation Age of Onset    Dementia Mother     Aneurysm Father         AAA    Hypertension Unknown     Cancer Maternal Grandfather         colon cancer - ? age of onset       Review of patient's allergies indicates:  No Known Allergies      Current Outpatient Medications:     aspirin (ECOTRIN) 81 MG EC tablet, Take 81 mg by mouth every other day. , Disp: , Rfl:     coenzyme Q10 100 mg capsule, Take 100 mg by mouth once daily., Disp: , Rfl:     DM/acetaminophen/doxylamine (CORICIDIN HBP COLD-MULTI SYMPT ORAL), Take by mouth., Disp: , Rfl:     flaxseed oil 1,000 mg Cap, Take by mouth. 1 Capsule Oral Every day, Disp: , Rfl:     fluticasone (FLONASE) 50 mcg/actuation nasal spray, 1 spray (50 mcg total) by Each Nare route once daily., Disp: 1 Bottle, Rfl: 0    ibuprofen (ADVIL,MOTRIN) 200 MG tablet, Take 200 mg by mouth every 6 (six) hours as needed for Pain., Disp: , Rfl:      "ketorolac (TORADOL) 10 mg tablet, Take 1 tablet (10 mg total) by mouth every 8 (eight) hours as needed for Pain., Disp: 60 tablet, Rfl: 0    meclizine (ANTIVERT) 25 mg tablet, Take 1 tablet (25 mg total) by mouth 3 (three) times daily as needed for Dizziness., Disp: 60 tablet, Rfl: 1    meloxicam (MOBIC) 7.5 MG tablet, Take 1 tablet (7.5 mg total) by mouth once daily. Take once a day for two weeks then as needed. Take with food, Disp: 30 tablet, Rfl: 0    multivitamin capsule, Take by mouth. 1 Capsule Oral Every day, Disp: , Rfl:     omega-3 fatty acids 1,000 mg Cap, Take by mouth. 1 Capsule Oral Every day, Disp: , Rfl:     psyllium 0.52 gram capsule, Take 0.52 g by mouth once daily., Disp: , Rfl:     Review of Systems:  Constitutional: no fever or chills  Eyes: no visual changes  ENT: no nasal congestion or sore throat  Respiratory: no cough or shortness of breath  Cardiovascular: no chest pain or palpitations  Gastrointestinal: no nausea or vomiting, tolerating diet  Genitourinary: no hematuria or dysuria  Integument/Breast: no rash or pruritis  Hematologic/Lymphatic: no easy bruising or lymphadenopathy  Musculoskeletal: positive for knee pain  Neurological: no seizures or tremors  Behavioral/Psych: no auditory or visual hallucinations  Endocrine: no heat or cold intolerance    PE:  Ht 5' 3" (1.6 m)   Wt 99.8 kg (220 lb 0.3 oz)   BMI 38.97 kg/m²   General: Pleasant, cooperative, NAD   Gait: antalgic  HEENT: NCAT, sclera nonicteric   Lungs: Respirations clear bilaterally; equal and unlabored.   CV: S1S2; 2+ bilateral upper and lower extremity pulses.   Skin: Intact throughout with no rashes, erythema, or lesions  Extremities: No LE edema,  no erythema or warmth of the skin in either lower extremity.    Right knee exam:  Knee Range of Motion:5-110 active, pain with passive range of motion  Effusion:none  Condition of skin:intact  Location of tenderness:Medial joint line   Strength:4 of 5 quadriceps strength " and 4 of 5 hamstring strength  Stability: stable to testing    Hip Examination:full painless range of motion, without tenderness    Radiographs: Radiographs reveal advanced degenerative changes including subchondral cyst formation, subchondral sclerosis, osteophyte formation, joint space narrowing.     Knee Alignment:  Mild varus    Diagnosis: osteoarthritis Right knee    Plan: Right total knee arthroplasty    Due to the serious nature of total joint infection and the high prevalence of community acquired MRSA, vancomycin will be used perioperatively.

## 2019-02-27 NOTE — PROGRESS NOTES
Keyona Herrera is a 63 y.o. year old here today for a pre-operative visit in preparation for a Right total knee arthroplasty to be performed by  Dr. [facility] on 3/4/2019.  she was last seen and treated in the clinic on 2/5/2019. she will be medically optimized by the pre op center. There has been no significant change in medical status since last visit. No fever, chills, malaise, or unexplained weight change.      Allergies, Medications, past medical and surgical history reviewed.    Focused examination performed.    Dr. [facility] saw this patient today in clinic. All questions answered. Patient encouraged to call with questions. Contact information given.     Pre, amie, and post operative procedures and expectations discussed. Questions were answered. Keyona Herrera has been educated and is ready to proceed with surgery. Approximately 30 minutes was spent discussing surgical outcomes, plans, procedures pre, amie, and post operative expections and care.  Surgical consent signed.    Keyona Herrera will contact us if there are any questions, concerns, or changes in medical status prior to surgery.

## 2019-02-27 NOTE — H&P
CC: Right knee pain    Keyona Herrera is a 63 y.o. female with 6 month history of Right knee pain. Pain is worse with activity and weight bearing.  Patient has experienced interference of activities of daily living due to decreased range of motion and an increase in joint pain and swelling.  Patient has failed non-operative treatment including NSAIDs, corticosteroid injections, viscosupplement injections, and activity modification.  Keyona Herrera currently ambulates independently.     Relevant medical conditions of significance in perioperative period:  Obesity: BMI ~39. Pt attempting to lose weight but knee pain making it difficult to be active.    Past Medical History:   Diagnosis Date    HTN (hypertension)     Hyperlipidemia     Obesity        Past Surgical History:   Procedure Laterality Date     SECTION      2    CHOLECYSTECTOMY      COLONOSCOPY N/A 2016    [facility]    HYSTERECTOMY      INCISIONAL BREAST BIOPSY      surgery on foot         Family History   Problem Relation Age of Onset    Dementia Mother     Aneurysm Father         AAA    Hypertension Unknown     Cancer Maternal Grandfather         colon cancer - ? age of onset       Review of patient's allergies indicates:  No Known Allergies      Current Outpatient Medications:     aspirin (ECOTRIN) 81 MG EC tablet, Take 81 mg by mouth every other day. , Disp: , Rfl:     coenzyme Q10 100 mg capsule, Take 100 mg by mouth once daily., Disp: , Rfl:     DM/acetaminophen/doxylamine (CORICIDIN HBP COLD-MULTI SYMPT ORAL), Take by mouth., Disp: , Rfl:     flaxseed oil 1,000 mg Cap, Take by mouth. 1 Capsule Oral Every day, Disp: , Rfl:     fluticasone (FLONASE) 50 mcg/actuation nasal spray, 1 spray (50 mcg total) by Each Nare route once daily., Disp: 1 Bottle, Rfl: 0    ibuprofen (ADVIL,MOTRIN) 200 MG tablet, Take 200 mg by mouth every 6 (six) hours as needed for Pain., Disp: , Rfl:      "ketorolac (TORADOL) 10 mg tablet, Take 1 tablet (10 mg total) by mouth every 8 (eight) hours as needed for Pain., Disp: 60 tablet, Rfl: 0    meclizine (ANTIVERT) 25 mg tablet, Take 1 tablet (25 mg total) by mouth 3 (three) times daily as needed for Dizziness., Disp: 60 tablet, Rfl: 1    meloxicam (MOBIC) 7.5 MG tablet, Take 1 tablet (7.5 mg total) by mouth once daily. Take once a day for two weeks then as needed. Take with food, Disp: 30 tablet, Rfl: 0    multivitamin capsule, Take by mouth. 1 Capsule Oral Every day, Disp: , Rfl:     omega-3 fatty acids 1,000 mg Cap, Take by mouth. 1 Capsule Oral Every day, Disp: , Rfl:     psyllium 0.52 gram capsule, Take 0.52 g by mouth once daily., Disp: , Rfl:     Review of Systems:  Constitutional: no fever or chills  Eyes: no visual changes  ENT: no nasal congestion or sore throat  Respiratory: no cough or shortness of breath  Cardiovascular: no chest pain or palpitations  Gastrointestinal: no nausea or vomiting, tolerating diet  Genitourinary: no hematuria or dysuria  Integument/Breast: no rash or pruritis  Hematologic/Lymphatic: no easy bruising or lymphadenopathy  Musculoskeletal: positive for knee pain  Neurological: no seizures or tremors  Behavioral/Psych: no auditory or visual hallucinations  Endocrine: no heat or cold intolerance    PE:  Ht 5' 3" (1.6 m)   Wt 99.8 kg (220 lb 0.3 oz)   BMI 38.97 kg/m²   General: Pleasant, cooperative, NAD   Gait: antalgic  HEENT: NCAT, sclera nonicteric   Lungs: Respirations clear bilaterally; equal and unlabored.   CV: S1S2; 2+ bilateral upper and lower extremity pulses.   Skin: Intact throughout with no rashes, erythema, or lesions  Extremities: No LE edema,  no erythema or warmth of the skin in either lower extremity.    Right knee exam:  Knee Range of Motion:5-110 active, pain with passive range of motion  Effusion:none  Condition of skin:intact  Location of tenderness:Medial joint line   Strength:4 of 5 quadriceps strength " and 4 of 5 hamstring strength  Stability: stable to testing    Hip Examination:full painless range of motion, without tenderness    Radiographs: Radiographs reveal advanced degenerative changes including subchondral cyst formation, subchondral sclerosis, osteophyte formation, joint space narrowing.     Knee Alignment:  Mild varus    Diagnosis: osteoarthritis Right knee    Plan: Right total knee arthroplasty    Due to the serious nature of total joint infection and the high prevalence of community acquired MRSA, vancomycin will be used perioperatively.

## 2019-03-01 ENCOUNTER — TELEPHONE (OUTPATIENT)
Dept: ORTHOPEDICS | Facility: CLINIC | Age: 64
End: 2019-03-01

## 2019-03-01 NOTE — TELEPHONE ENCOUNTER
we spoke : Reminded to eat light the night before surgery, NPO after midnight, take hibclens shower the night before surgery  & again in the am , sleep on clean sheets  in clean clothes, no laxatives or stool softeners , report to the 2nd floor surgery unit for 10 :30 am Monday morning  She voiced understanding

## 2019-03-04 ENCOUNTER — HOSPITAL ENCOUNTER (OUTPATIENT)
Facility: HOSPITAL | Age: 64
LOS: 1 days | Discharge: HOME | End: 2019-03-05
Attending: ORTHOPAEDIC SURGERY | Admitting: ORTHOPAEDIC SURGERY
Payer: COMMERCIAL

## 2019-03-04 ENCOUNTER — ANESTHESIA (OUTPATIENT)
Dept: SURGERY | Facility: HOSPITAL | Age: 64
End: 2019-03-04
Payer: COMMERCIAL

## 2019-03-04 DIAGNOSIS — M17.11 PRIMARY OSTEOARTHRITIS OF RIGHT KNEE: Primary | ICD-10-CM

## 2019-03-04 LAB
ANION GAP SERPL CALC-SCNC: 8 MMOL/L
BUN SERPL-MCNC: 11 MG/DL
CALCIUM SERPL-MCNC: 8.6 MG/DL
CHLORIDE SERPL-SCNC: 107 MMOL/L
CO2 SERPL-SCNC: 24 MMOL/L
CREAT SERPL-MCNC: 0.7 MG/DL
EST. GFR  (AFRICAN AMERICAN): >60 ML/MIN/1.73 M^2
EST. GFR  (NON AFRICAN AMERICAN): >60 ML/MIN/1.73 M^2
GLUCOSE SERPL-MCNC: 104 MG/DL
POTASSIUM SERPL-SCNC: 4.4 MMOL/L
SODIUM SERPL-SCNC: 139 MMOL/L

## 2019-03-04 PROCEDURE — 64448 ADDUCTOR CANAL CATHETER: ICD-10-PCS | Mod: 59,RT,, | Performed by: ANESTHESIOLOGY

## 2019-03-04 PROCEDURE — 36000711: Performed by: ORTHOPAEDIC SURGERY

## 2019-03-04 PROCEDURE — 63600175 PHARM REV CODE 636 W HCPCS: Performed by: NURSE ANESTHETIST, CERTIFIED REGISTERED

## 2019-03-04 PROCEDURE — 27800517 HC TRAY,EPIDURAL-CONTINUOUS: Performed by: ANESTHESIOLOGY

## 2019-03-04 PROCEDURE — 63600175 PHARM REV CODE 636 W HCPCS: Performed by: PHYSICIAN ASSISTANT

## 2019-03-04 PROCEDURE — 63600175 PHARM REV CODE 636 W HCPCS: Performed by: ANESTHESIOLOGY

## 2019-03-04 PROCEDURE — 27447 PR TOTAL KNEE ARTHROPLASTY: ICD-10-PCS | Mod: RT,,, | Performed by: ORTHOPAEDIC SURGERY

## 2019-03-04 PROCEDURE — 25000003 PHARM REV CODE 250: Performed by: NURSE ANESTHETIST, CERTIFIED REGISTERED

## 2019-03-04 PROCEDURE — 36415 COLL VENOUS BLD VENIPUNCTURE: CPT

## 2019-03-04 PROCEDURE — 27201423 OPTIME MED/SURG SUP & DEVICES STERILE SUPPLY: Performed by: ORTHOPAEDIC SURGERY

## 2019-03-04 PROCEDURE — 25000003 PHARM REV CODE 250: Performed by: PHYSICIAN ASSISTANT

## 2019-03-04 PROCEDURE — C1776 JOINT DEVICE (IMPLANTABLE): HCPCS | Performed by: ORTHOPAEDIC SURGERY

## 2019-03-04 PROCEDURE — 71000039 HC RECOVERY, EACH ADD'L HOUR: Performed by: ORTHOPAEDIC SURGERY

## 2019-03-04 PROCEDURE — 36000710: Performed by: ORTHOPAEDIC SURGERY

## 2019-03-04 PROCEDURE — D9220A PRA ANESTHESIA: Mod: ANES,,, | Performed by: ANESTHESIOLOGY

## 2019-03-04 PROCEDURE — D9220A PRA ANESTHESIA: ICD-10-PCS | Mod: CRNA,,, | Performed by: NURSE ANESTHETIST, CERTIFIED REGISTERED

## 2019-03-04 PROCEDURE — 25000003 PHARM REV CODE 250: Performed by: ORTHOPAEDIC SURGERY

## 2019-03-04 PROCEDURE — 37000009 HC ANESTHESIA EA ADD 15 MINS: Performed by: ORTHOPAEDIC SURGERY

## 2019-03-04 PROCEDURE — 27447 TOTAL KNEE ARTHROPLASTY: CPT | Mod: RT,,, | Performed by: ORTHOPAEDIC SURGERY

## 2019-03-04 PROCEDURE — D9220A PRA ANESTHESIA: ICD-10-PCS | Mod: ANES,,, | Performed by: ANESTHESIOLOGY

## 2019-03-04 PROCEDURE — 27000221 HC OXYGEN, UP TO 24 HOURS

## 2019-03-04 PROCEDURE — 76942 ADDUCTOR CANAL CATHETER: ICD-10-PCS | Mod: 26,,, | Performed by: ANESTHESIOLOGY

## 2019-03-04 PROCEDURE — 88305 TISSUE EXAM BY PATHOLOGIST: CPT | Mod: 26,,, | Performed by: PATHOLOGY

## 2019-03-04 PROCEDURE — 88305 TISSUE EXAM BY PATHOLOGIST: CPT | Performed by: PATHOLOGY

## 2019-03-04 PROCEDURE — D9220A PRA ANESTHESIA: Mod: CRNA,,, | Performed by: NURSE ANESTHETIST, CERTIFIED REGISTERED

## 2019-03-04 PROCEDURE — 88311 TISSUE SPECIMEN TO PATHOLOGY - SURGERY: ICD-10-PCS | Mod: 26,,, | Performed by: PATHOLOGY

## 2019-03-04 PROCEDURE — 88311 DECALCIFY TISSUE: CPT | Mod: 26,,, | Performed by: PATHOLOGY

## 2019-03-04 PROCEDURE — 71000033 HC RECOVERY, INTIAL HOUR: Performed by: ORTHOPAEDIC SURGERY

## 2019-03-04 PROCEDURE — 63600175 PHARM REV CODE 636 W HCPCS: Performed by: ORTHOPAEDIC SURGERY

## 2019-03-04 PROCEDURE — 94761 N-INVAS EAR/PLS OXIMETRY MLT: CPT

## 2019-03-04 PROCEDURE — 88305 TISSUE SPECIMEN TO PATHOLOGY - SURGERY: ICD-10-PCS | Mod: 26,,, | Performed by: PATHOLOGY

## 2019-03-04 PROCEDURE — 27100025 HC TUBING, SET FLUID WARMER: Performed by: NURSE ANESTHETIST, CERTIFIED REGISTERED

## 2019-03-04 PROCEDURE — 80048 BASIC METABOLIC PNL TOTAL CA: CPT

## 2019-03-04 PROCEDURE — 37000008 HC ANESTHESIA 1ST 15 MINUTES: Performed by: ORTHOPAEDIC SURGERY

## 2019-03-04 PROCEDURE — 76942 ECHO GUIDE FOR BIOPSY: CPT | Performed by: ANESTHESIOLOGY

## 2019-03-04 PROCEDURE — 64448 NJX AA&/STRD FEM NRV NFS IMG: CPT | Mod: 59,RT,, | Performed by: ANESTHESIOLOGY

## 2019-03-04 PROCEDURE — C1713 ANCHOR/SCREW BN/BN,TIS/BN: HCPCS | Performed by: ORTHOPAEDIC SURGERY

## 2019-03-04 DEVICE — BASEPLATE TIB PSN CEM SZ C 5 R: Type: IMPLANTABLE DEVICE | Site: KNEE | Status: FUNCTIONAL

## 2019-03-04 DEVICE — CEMENT BONE WHOLE BATCH: Type: IMPLANTABLE DEVICE | Site: KNEE | Status: FUNCTIONAL

## 2019-03-04 DEVICE — PLUG BONE #5: Type: IMPLANTABLE DEVICE | Site: KNEE | Status: FUNCTIONAL

## 2019-03-04 DEVICE — IMPLANTABLE DEVICE: Type: IMPLANTABLE DEVICE | Site: KNEE | Status: FUNCTIONAL

## 2019-03-04 DEVICE — PATELLA PSN CEM POLY 8X29MM: Type: IMPLANTABLE DEVICE | Site: KNEE | Status: FUNCTIONAL

## 2019-03-04 DEVICE — PSN ASF PS 11 MM VE R 3-5 CD: Type: IMPLANTABLE DEVICE | Site: KNEE | Status: FUNCTIONAL

## 2019-03-04 RX ORDER — ASPIRIN 81 MG/1
81 TABLET ORAL 2 TIMES DAILY
Status: DISCONTINUED | OUTPATIENT
Start: 2019-03-04 | End: 2019-03-05 | Stop reason: HOSPADM

## 2019-03-04 RX ORDER — DOCUSATE SODIUM 100 MG/1
100 CAPSULE, LIQUID FILLED ORAL 2 TIMES DAILY PRN
Qty: 60 CAPSULE | Refills: 0 | Status: SHIPPED | OUTPATIENT
Start: 2019-03-04 | End: 2022-06-03

## 2019-03-04 RX ORDER — PREGABALIN 50 MG/1
150 CAPSULE ORAL NIGHTLY
Status: DISCONTINUED | OUTPATIENT
Start: 2019-03-04 | End: 2019-03-05 | Stop reason: HOSPADM

## 2019-03-04 RX ORDER — BISACODYL 10 MG
10 SUPPOSITORY, RECTAL RECTAL EVERY 12 HOURS PRN
Status: DISCONTINUED | OUTPATIENT
Start: 2019-03-04 | End: 2019-03-05 | Stop reason: HOSPADM

## 2019-03-04 RX ORDER — ROPIVACAINE HYDROCHLORIDE 5 MG/ML
INJECTION, SOLUTION EPIDURAL; INFILTRATION; PERINEURAL
Status: COMPLETED | OUTPATIENT
Start: 2019-03-04 | End: 2019-03-04

## 2019-03-04 RX ORDER — SODIUM CHLORIDE 0.9 % (FLUSH) 0.9 %
3 SYRINGE (ML) INJECTION EVERY 8 HOURS PRN
Status: DISCONTINUED | OUTPATIENT
Start: 2019-03-04 | End: 2019-03-05 | Stop reason: HOSPADM

## 2019-03-04 RX ORDER — PROPOFOL 10 MG/ML
VIAL (ML) INTRAVENOUS CONTINUOUS PRN
Status: DISCONTINUED | OUTPATIENT
Start: 2019-03-04 | End: 2019-03-04

## 2019-03-04 RX ORDER — PHENYLEPHRINE HYDROCHLORIDE 10 MG/ML
INJECTION INTRAVENOUS
Status: DISCONTINUED | OUTPATIENT
Start: 2019-03-04 | End: 2019-03-04

## 2019-03-04 RX ORDER — ROPIVACAINE HYDROCHLORIDE 2 MG/ML
8 INJECTION, SOLUTION EPIDURAL; INFILTRATION; PERINEURAL CONTINUOUS
Status: DISCONTINUED | OUTPATIENT
Start: 2019-03-04 | End: 2019-03-05

## 2019-03-04 RX ORDER — MIDAZOLAM HYDROCHLORIDE 1 MG/ML
1 INJECTION INTRAMUSCULAR; INTRAVENOUS EVERY 5 MIN PRN
Status: DISCONTINUED | OUTPATIENT
Start: 2019-03-04 | End: 2019-03-04 | Stop reason: HOSPADM

## 2019-03-04 RX ORDER — NALOXONE HCL 0.4 MG/ML
0.02 VIAL (ML) INJECTION
Status: DISCONTINUED | OUTPATIENT
Start: 2019-03-04 | End: 2019-03-05 | Stop reason: HOSPADM

## 2019-03-04 RX ORDER — FLUTICASONE PROPIONATE 50 MCG
1 SPRAY, SUSPENSION (ML) NASAL DAILY
Status: DISCONTINUED | OUTPATIENT
Start: 2019-03-05 | End: 2019-03-05 | Stop reason: HOSPADM

## 2019-03-04 RX ORDER — LIDOCAINE HYDROCHLORIDE 10 MG/ML
1 INJECTION, SOLUTION EPIDURAL; INFILTRATION; INTRACAUDAL; PERINEURAL
Status: COMPLETED | OUTPATIENT
Start: 2019-03-04 | End: 2019-03-04

## 2019-03-04 RX ORDER — FAMOTIDINE 20 MG/1
20 TABLET, FILM COATED ORAL 2 TIMES DAILY
Status: DISCONTINUED | OUTPATIENT
Start: 2019-03-04 | End: 2019-03-05 | Stop reason: HOSPADM

## 2019-03-04 RX ORDER — DEXAMETHASONE SODIUM PHOSPHATE 4 MG/ML
INJECTION, SOLUTION INTRA-ARTICULAR; INTRALESIONAL; INTRAMUSCULAR; INTRAVENOUS; SOFT TISSUE
Status: DISCONTINUED | OUTPATIENT
Start: 2019-03-04 | End: 2019-03-04

## 2019-03-04 RX ORDER — ASPIRIN 325 MG
325 TABLET, DELAYED RELEASE (ENTERIC COATED) ORAL 2 TIMES DAILY
Qty: 60 TABLET | Refills: 0 | Status: SHIPPED | OUTPATIENT
Start: 2019-03-04 | End: 2022-10-27

## 2019-03-04 RX ORDER — MORPHINE SULFATE 2 MG/ML
2 INJECTION, SOLUTION INTRAMUSCULAR; INTRAVENOUS
Status: DISCONTINUED | OUTPATIENT
Start: 2019-03-04 | End: 2019-03-05 | Stop reason: HOSPADM

## 2019-03-04 RX ORDER — VANCOMYCIN HYDROCHLORIDE 500 MG/10ML
INJECTION, POWDER, LYOPHILIZED, FOR SOLUTION INTRAVENOUS
Status: DISCONTINUED | OUTPATIENT
Start: 2019-03-04 | End: 2019-03-04 | Stop reason: HOSPADM

## 2019-03-04 RX ORDER — CEFAZOLIN SODIUM 1 G/3ML
2 INJECTION, POWDER, FOR SOLUTION INTRAMUSCULAR; INTRAVENOUS
Status: DISCONTINUED | OUTPATIENT
Start: 2019-03-04 | End: 2019-03-04 | Stop reason: HOSPADM

## 2019-03-04 RX ORDER — SODIUM CHLORIDE 9 MG/ML
INJECTION, SOLUTION INTRAVENOUS
Status: COMPLETED | OUTPATIENT
Start: 2019-03-04 | End: 2019-03-04

## 2019-03-04 RX ORDER — AMOXICILLIN 250 MG
1 CAPSULE ORAL 2 TIMES DAILY
Status: DISCONTINUED | OUTPATIENT
Start: 2019-03-04 | End: 2019-03-05 | Stop reason: HOSPADM

## 2019-03-04 RX ORDER — PREGABALIN 150 MG/1
150 CAPSULE ORAL
Status: COMPLETED | OUTPATIENT
Start: 2019-03-04 | End: 2019-03-04

## 2019-03-04 RX ORDER — SODIUM CHLORIDE 9 MG/ML
INJECTION, SOLUTION INTRAVENOUS CONTINUOUS
Status: ACTIVE | OUTPATIENT
Start: 2019-03-04 | End: 2019-03-05

## 2019-03-04 RX ORDER — VANCOMYCIN HYDROCHLORIDE
1500
Status: COMPLETED | OUTPATIENT
Start: 2019-03-05 | End: 2019-03-05

## 2019-03-04 RX ORDER — OXYCODONE AND ACETAMINOPHEN 10; 325 MG/1; MG/1
1 TABLET ORAL EVERY 4 HOURS PRN
Qty: 50 TABLET | Refills: 0 | Status: SHIPPED | OUTPATIENT
Start: 2019-03-04 | End: 2019-03-19 | Stop reason: SDUPTHER

## 2019-03-04 RX ORDER — MUPIROCIN 20 MG/G
1 OINTMENT TOPICAL
Status: COMPLETED | OUTPATIENT
Start: 2019-03-04 | End: 2019-03-04

## 2019-03-04 RX ORDER — FENTANYL CITRATE 50 UG/ML
25 INJECTION, SOLUTION INTRAMUSCULAR; INTRAVENOUS EVERY 5 MIN PRN
Status: DISCONTINUED | OUTPATIENT
Start: 2019-03-04 | End: 2019-03-04 | Stop reason: HOSPADM

## 2019-03-04 RX ORDER — OXYCODONE HYDROCHLORIDE 10 MG/1
10 TABLET ORAL
Status: DISCONTINUED | OUTPATIENT
Start: 2019-03-04 | End: 2019-03-05 | Stop reason: HOSPADM

## 2019-03-04 RX ORDER — RAMELTEON 8 MG/1
8 TABLET ORAL NIGHTLY PRN
Status: DISCONTINUED | OUTPATIENT
Start: 2019-03-04 | End: 2019-03-05 | Stop reason: HOSPADM

## 2019-03-04 RX ORDER — OXYCODONE HYDROCHLORIDE 5 MG/1
5 TABLET ORAL
Status: DISCONTINUED | OUTPATIENT
Start: 2019-03-04 | End: 2019-03-05 | Stop reason: HOSPADM

## 2019-03-04 RX ORDER — POLYETHYLENE GLYCOL 3350 17 G/17G
17 POWDER, FOR SOLUTION ORAL DAILY
Status: DISCONTINUED | OUTPATIENT
Start: 2019-03-05 | End: 2019-03-05 | Stop reason: HOSPADM

## 2019-03-04 RX ORDER — GENTAMICIN SULFATE 40 MG/ML
INJECTION, SOLUTION INTRAMUSCULAR; INTRAVENOUS
Status: DISCONTINUED | OUTPATIENT
Start: 2019-03-04 | End: 2019-03-04 | Stop reason: HOSPADM

## 2019-03-04 RX ORDER — GLYCOPYRROLATE 0.2 MG/ML
INJECTION INTRAMUSCULAR; INTRAVENOUS
Status: DISCONTINUED | OUTPATIENT
Start: 2019-03-04 | End: 2019-03-04

## 2019-03-04 RX ORDER — ONDANSETRON 2 MG/ML
4 INJECTION INTRAMUSCULAR; INTRAVENOUS EVERY 8 HOURS PRN
Status: DISCONTINUED | OUTPATIENT
Start: 2019-03-04 | End: 2019-03-05 | Stop reason: HOSPADM

## 2019-03-04 RX ORDER — FENTANYL CITRATE 50 UG/ML
25 INJECTION, SOLUTION INTRAMUSCULAR; INTRAVENOUS EVERY 5 MIN PRN
Status: COMPLETED | OUTPATIENT
Start: 2019-03-04 | End: 2019-03-04

## 2019-03-04 RX ORDER — SODIUM CHLORIDE 9 MG/ML
INJECTION, SOLUTION INTRAVENOUS CONTINUOUS PRN
Status: DISCONTINUED | OUTPATIENT
Start: 2019-03-04 | End: 2019-03-04

## 2019-03-04 RX ORDER — CELECOXIB 200 MG/1
400 CAPSULE ORAL
Status: COMPLETED | OUTPATIENT
Start: 2019-03-04 | End: 2019-03-04

## 2019-03-04 RX ORDER — MIDAZOLAM HYDROCHLORIDE 1 MG/ML
INJECTION, SOLUTION INTRAMUSCULAR; INTRAVENOUS
Status: DISCONTINUED | OUTPATIENT
Start: 2019-03-04 | End: 2019-03-04

## 2019-03-04 RX ORDER — CELECOXIB 200 MG/1
200 CAPSULE ORAL DAILY
Status: DISCONTINUED | OUTPATIENT
Start: 2019-03-05 | End: 2019-03-05 | Stop reason: HOSPADM

## 2019-03-04 RX ORDER — LIDOCAINE HCL/PF 100 MG/5ML
SYRINGE (ML) INTRAVENOUS
Status: DISCONTINUED | OUTPATIENT
Start: 2019-03-04 | End: 2019-03-04

## 2019-03-04 RX ORDER — ACETAMINOPHEN 500 MG
1000 TABLET ORAL EVERY 6 HOURS
Status: DISCONTINUED | OUTPATIENT
Start: 2019-03-04 | End: 2019-03-05 | Stop reason: HOSPADM

## 2019-03-04 RX ORDER — MUPIROCIN 20 MG/G
1 OINTMENT TOPICAL 2 TIMES DAILY
Status: DISCONTINUED | OUTPATIENT
Start: 2019-03-04 | End: 2019-03-05 | Stop reason: HOSPADM

## 2019-03-04 RX ORDER — SODIUM CHLORIDE 0.9 % (FLUSH) 0.9 %
3 SYRINGE (ML) INJECTION
Status: DISCONTINUED | OUTPATIENT
Start: 2019-03-04 | End: 2019-03-04 | Stop reason: HOSPADM

## 2019-03-04 RX ORDER — VANCOMYCIN HYDROCHLORIDE
1500
Status: COMPLETED | OUTPATIENT
Start: 2019-03-04 | End: 2019-03-04

## 2019-03-04 RX ORDER — KETAMINE HCL IN 0.9 % NACL 50 MG/5 ML
SYRINGE (ML) INTRAVENOUS
Status: DISCONTINUED | OUTPATIENT
Start: 2019-03-04 | End: 2019-03-04

## 2019-03-04 RX ORDER — CEFAZOLIN SODIUM 1 G/3ML
2 INJECTION, POWDER, FOR SOLUTION INTRAMUSCULAR; INTRAVENOUS
Status: COMPLETED | OUTPATIENT
Start: 2019-03-04 | End: 2019-03-04

## 2019-03-04 RX ORDER — ASPIRIN 81 MG/1
81 TABLET ORAL 2 TIMES DAILY
Qty: 60 TABLET | Refills: 0 | Status: SHIPPED | OUTPATIENT
Start: 2019-03-04 | End: 2019-04-03

## 2019-03-04 RX ORDER — ACETAMINOPHEN 10 MG/ML
1000 INJECTION, SOLUTION INTRAVENOUS ONCE
Status: COMPLETED | OUTPATIENT
Start: 2019-03-04 | End: 2019-03-04

## 2019-03-04 RX ADMIN — CELECOXIB 400 MG: 200 CAPSULE ORAL at 11:03

## 2019-03-04 RX ADMIN — ROPIVACAINE HYDROCHLORIDE 10 ML: 5 INJECTION, SOLUTION EPIDURAL; INFILTRATION; PERINEURAL at 12:03

## 2019-03-04 RX ADMIN — PREGABALIN 150 MG: 50 CAPSULE ORAL at 09:03

## 2019-03-04 RX ADMIN — SODIUM CHLORIDE: 0.9 INJECTION, SOLUTION INTRAVENOUS at 12:03

## 2019-03-04 RX ADMIN — CEFAZOLIN 2 G: 330 INJECTION, POWDER, FOR SOLUTION INTRAMUSCULAR; INTRAVENOUS at 02:03

## 2019-03-04 RX ADMIN — Medication 1500 MG: at 11:03

## 2019-03-04 RX ADMIN — FENTANYL CITRATE 25 MCG: 50 INJECTION INTRAMUSCULAR; INTRAVENOUS at 04:03

## 2019-03-04 RX ADMIN — SODIUM CHLORIDE: 0.9 INJECTION, SOLUTION INTRAVENOUS at 04:03

## 2019-03-04 RX ADMIN — MUPIROCIN 1 G: 20 OINTMENT TOPICAL at 09:03

## 2019-03-04 RX ADMIN — Medication 10 MG: at 02:03

## 2019-03-04 RX ADMIN — MORPHINE SULFATE 2 MG: 2 INJECTION, SOLUTION INTRAMUSCULAR; INTRAVENOUS at 06:03

## 2019-03-04 RX ADMIN — PREGABALIN 150 MG: 75 CAPSULE ORAL at 11:03

## 2019-03-04 RX ADMIN — LIDOCAINE HYDROCHLORIDE 20 MG: 20 INJECTION, SOLUTION INTRAVENOUS at 01:03

## 2019-03-04 RX ADMIN — MUPIROCIN 1 G: 20 OINTMENT TOPICAL at 11:03

## 2019-03-04 RX ADMIN — PHENYLEPHRINE HYDROCHLORIDE 100 MCG: 10 INJECTION INTRAVENOUS at 02:03

## 2019-03-04 RX ADMIN — GLYCOPYRROLATE 0.2 MG: 0.2 INJECTION, SOLUTION INTRAMUSCULAR; INTRAVENOUS at 02:03

## 2019-03-04 RX ADMIN — SODIUM CHLORIDE, SODIUM GLUCONATE, SODIUM ACETATE, POTASSIUM CHLORIDE, MAGNESIUM CHLORIDE, SODIUM PHOSPHATE, DIBASIC, AND POTASSIUM PHOSPHATE: .53; .5; .37; .037; .03; .012; .00082 INJECTION, SOLUTION INTRAVENOUS at 02:03

## 2019-03-04 RX ADMIN — ROPIVACAINE HYDROCHLORIDE 8 ML/HR: 2 INJECTION, SOLUTION EPIDURAL; INFILTRATION at 05:03

## 2019-03-04 RX ADMIN — DEXAMETHASONE SODIUM PHOSPHATE 8 MG: 4 INJECTION, SOLUTION INTRAMUSCULAR; INTRAVENOUS at 02:03

## 2019-03-04 RX ADMIN — STANDARDIZED SENNA CONCENTRATE AND DOCUSATE SODIUM 1 TABLET: 8.6; 5 TABLET, FILM COATED ORAL at 09:03

## 2019-03-04 RX ADMIN — Medication 30 MG: at 01:03

## 2019-03-04 RX ADMIN — SODIUM CHLORIDE, SODIUM GLUCONATE, SODIUM ACETATE, POTASSIUM CHLORIDE, MAGNESIUM CHLORIDE, SODIUM PHOSPHATE, DIBASIC, AND POTASSIUM PHOSPHATE: .53; .5; .37; .037; .03; .012; .00082 INJECTION, SOLUTION INTRAVENOUS at 03:03

## 2019-03-04 RX ADMIN — CEFAZOLIN 2 G: 330 INJECTION, POWDER, FOR SOLUTION INTRAMUSCULAR; INTRAVENOUS at 09:03

## 2019-03-04 RX ADMIN — OXYCODONE HYDROCHLORIDE 15 MG: 5 TABLET ORAL at 04:03

## 2019-03-04 RX ADMIN — FENTANYL CITRATE 50 MCG: 50 INJECTION INTRAMUSCULAR; INTRAVENOUS at 12:03

## 2019-03-04 RX ADMIN — Medication 10 MG: at 03:03

## 2019-03-04 RX ADMIN — ACETAMINOPHEN 1000 MG: 10 INJECTION, SOLUTION INTRAVENOUS at 04:03

## 2019-03-04 RX ADMIN — MIDAZOLAM HYDROCHLORIDE 1 MG: 1 INJECTION, SOLUTION INTRAMUSCULAR; INTRAVENOUS at 01:03

## 2019-03-04 RX ADMIN — OXYCODONE HYDROCHLORIDE 15 MG: 5 TABLET ORAL at 10:03

## 2019-03-04 RX ADMIN — MEPIVACAINE HYDROCHLORIDE 3 ML: 15 INJECTION, SOLUTION EPIDURAL; INFILTRATION at 02:03

## 2019-03-04 RX ADMIN — PROPOFOL 100 MCG/KG/MIN: 10 INJECTION, EMULSION INTRAVENOUS at 01:03

## 2019-03-04 RX ADMIN — LIDOCAINE HYDROCHLORIDE 0.2 MG: 10 INJECTION, SOLUTION EPIDURAL; INFILTRATION; INTRACAUDAL; PERINEURAL at 11:03

## 2019-03-04 RX ADMIN — SODIUM CHLORIDE: 0.9 INJECTION, SOLUTION INTRAVENOUS at 11:03

## 2019-03-04 RX ADMIN — ASPIRIN 81 MG: 81 TABLET, COATED ORAL at 09:03

## 2019-03-04 RX ADMIN — FAMOTIDINE 20 MG: 20 TABLET ORAL at 09:03

## 2019-03-04 RX ADMIN — FENTANYL CITRATE 25 MCG: 50 INJECTION INTRAMUSCULAR; INTRAVENOUS at 05:03

## 2019-03-04 RX ADMIN — MIDAZOLAM HYDROCHLORIDE 1 MG: 1 INJECTION, SOLUTION INTRAMUSCULAR; INTRAVENOUS at 12:03

## 2019-03-04 RX ADMIN — ACETAMINOPHEN 1000 MG: 500 TABLET ORAL at 05:03

## 2019-03-04 NOTE — BRIEF OP NOTE
[facility]  Surgery Department  Operative Note    SUMMARY     Date of Procedure: 3/4/2019     Procedure: Procedure(s) (LRB):  REPLACEMENT-KNEE-TOTAL (Right)     Surgeon(s) and Role:     * [facility]., MD - Primary    Assisting Surgeon: None    Pre-Operative Diagnosis: Osteoarthritis of knee, unspecified (CODE) [M17.9]    Post-Operative Diagnosis: Post-Op Diagnosis Codes:     * Osteoarthritis of knee, unspecified (CODE) [M17.9]    Anesthesia: Femoral Block    Technical Procedures Used:  PS       Description of the Findings of the Procedure: Varus    Significant Surgical Tasks Conducted by the Assistant(s), if Applicable: closure    Complications: No    Estimated Blood Loss (EBL): 100cc             Implants:   Implant Name Type Inv. Item Serial No.  Lot No. LRB No. Used   PLUG BONE #5 - [id number]  PLUG BONE #5  [employer] Wra[employer]USTINO. [id number] Right 1   SCREW HEX HEAD - [id number]  SCREW HEX HEAD  [employer]  Right 1   BIT DRILL PIN TROCAR 3.2MM - [id number]  BIT DRILL PIN TROCAR 3.2MM  [employer],INC  Right 2   SCREW HEX HEAD 3.5X38MM - [id number]  SCREW HEX HEAD 3.5X38MM  [employer],INC  Right 2   simplex bone cement     [id number] Right 2   PATELLA PSN SABA POLY 8X29MM - [id number]  PATELLA PSN SABA POLY 8X29MM  [employer]END[employer]ZA,INC [id number] Right 1   BASEPLATE TIB PSN SABA SZ C 5 R - [id number]  BASEPLATE TIB PSN SABA SZ C 5 R  [employer]ENDOZA,INC [id number] Right 1   COMP FEM POST PSN SZ 5 RT - [id number]  COMP FEM POST PSN SZ 5 RT  [employer],INC [id number] Right 1   PSN ASF PS 11 MM VE R 3-5 CD - [id number]  PSN ASF PS 11 MM VE R 3-5 CD  [employer]ZA,INC [id number] Right 1       Specimens:   Specimen (12h ago, onward)    Start     Ordered    03/04/19 1435  Specimen to Pathology - Surgery  Once     Comments:  1.) Right knee bone and soft tissue - Permanent     Start Status   03/04/19 1435 Collected (03/04/19 1435)       03/04/19 1435                  Condition: Good    Disposition: PACU - hemodynamically stable.    Attestation:  I was present and scrubbed for the entire procedure.

## 2019-03-04 NOTE — NURSING TRANSFER
Nursing Transfer Note      3/4/2019     Transfer To: [address]    Transfer via stretcher    Transfer with  O2, IV pole, polar ice    Transported by PCT    Medicines sent: MIVF, ropivicaine     Chart send with patient: Yes    Notified: spouse    Patient reassessed at: 3/4/19

## 2019-03-04 NOTE — OP NOTE
DATE OF PROCEDURE:  03/04/2019.    SURGEON:  [facility], Jr., M.D.    ASSISTANT:  Ej Lance.    OPERATION PERFORMED:  Right total knee arthroplasty.    PREOPERATIVE DIAGNOSIS:  Osteoarthritis, right knee.    POSTOPERATIVE DIAGNOSIS:  Osteoarthritis, right knee.    COMPONENTS USED:  Joselyn Persona knee system.  We used a size 5 femur narrow   posterior stabilized; a size C tibia, 5-degree stemmed; and an 11 mm articular   insert, posterior stabilized, vitamin E, highly cross-linked poly, and 29 mm   patella.    OPERATIVE TECHNIQUE:  Estimated blood loss, 100 mL.  Resected bone and tissue   sent to Pathology for routine examination.  The patient was placed supine on the   operating table.  Spinal anesthesia was introduced.  The right leg was prepped   and draped in sterile fashion.  The room was laminar airflow.  The patient was   given preoperative antibiotics and the OR team wore the sterile exhaust suits in   order to minimize risk for infection.  A foot pump was placed on the left foot   to help prevent DVT.  Right leg was exsanguinated and the tourniquet was   inflated to 300 pounds of pressure.  A straight anterior incision was made.    Sharp dissection was carried down to the extensor mechanism.  The extensor   mechanism was opened in a straight Insall approach.  Partial release of the   medial collateral ligament was performed to correct some slight varus deformity.    The intramedullary guide was placed in the femur.  Distal cut was made at 5   degrees of valgus on the +2 setting.  There was a slight flexion contracture.    We made the proximal tibial cut, removing about 3 to 4 from the medial side and   about 7 to 8 from the lateral side.  We then measured the femur for a 5 and cut   posteriorly for the 5.  The flexion gap was a little bigger than the extension   gap.  We took another 2 off the extension gap.  I went ahead and did the notch   and chamfer-plasty on the femur, sized the tibia  to a C, and drilled and   prepared it with the same rotation as the femur.  The patella was 22 mm and we   cut it at about 14.  I then Pulsavac'd and dried the surfaces, cemented the   tibial baseplate, then the femoral component, removed the excess cement, put the   11 mm trial in, put the knee out in extension, and cemented the patella.  While   the cement was hardening, we bathed the knee in the Betadine solution.  Then, I   put the actual insert in, closed the extensor mechanism with 1 Vicryl over   tranexamic acid and vancomycin powder, closed the subcutaneous tissues with 0   and 3-0 Vicryl, and closed the skin with a running subcuticular 3-0 Monocryl and   skin adhesive.  Sterile surgical dressing was applied.  There were no   complications to the procedure.      AZAM  dd: 03/04/2019 16:12:50 (CST)  td: 03/04/2019 17:44:44 (CST)  Doc ID   #[id number]  Job ID #[id number]    CC:

## 2019-03-04 NOTE — TRANSFER OF CARE
"Anesthesia Transfer of Care Note    Patient: Keyona Herrera    Procedure(s) Performed: Procedure(s) (LRB):  REPLACEMENT-KNEE-TOTAL (Right)    Patient location: PACU    Anesthesia Type: spinal and general    Transport from OR: Transported from OR on 6-10 L/min O2 by face mask with adequate spontaneous ventilation    Post pain: adequate analgesia    Post assessment: no apparent anesthetic complications and tolerated procedure well    Post vital signs: stable    Level of consciousness: awake    Nausea/Vomiting: no nausea/vomiting    Complications: none    Transfer of care protocol was followed      Last vitals:   Visit Vitals  /65 (BP Location: Right arm, Patient Position: Lying)   Pulse 73   Temp 36.8 °C (98.3 °F)   Resp 20   Ht 5' 3" (1.6 m)   Wt 99.3 kg (219 lb)   SpO2 99%   Breastfeeding? No   BMI 38.79 kg/m²     "

## 2019-03-04 NOTE — ANESTHESIA PROCEDURE NOTES
Spinal    Diagnosis: right knee pain  Patient location during procedure: OR  Start time: 3/4/2019 1:45 PM  Timeout: 3/4/2019 1:45 PM  End time: 3/4/2019 1:50 PM  Staffing  Anesthesiologist: Ej Leroy Jr., MD  Performed: anesthesiologist   Preanesthetic Checklist  Completed: patient identified, site marked, surgical consent, pre-op evaluation, timeout performed, IV checked, risks and benefits discussed and monitors and equipment checked  Spinal Block  Patient position: sitting  Prep: ChloraPrep  Patient monitoring: heart rate and continuous pulse ox  Approach: midline  Location: L3-4  Injection technique: single shot  CSF Fluid: blood-tinged free-flowing CSF  Needle  Needle type: Quincke   Needle gauge: 25 G  Needle length: 3.5 in  Additional Documentation: incremental injection, negative aspiration for heme and no paresthesia on injection  Needle localization: anatomical landmarks  Assessment  Ease of block: easy  Patient's tolerance of the procedure: comfortable throughout block

## 2019-03-04 NOTE — ANESTHESIA PROCEDURE NOTES
Adductor Canal Catheter    Patient location during procedure: pre-op   Block not for primary anesthetic.  Reason for block: at surgeon's request and post-op pain management   Post-op Pain Location: Right knee  Start time: 3/4/2019 12:11 PM  Timeout: 3/4/2019 12:09 PM   End time: 3/4/2019 12:31 PM  Staffing  Anesthesiologist: Elias Ramirez MD  Other anesthesia staff: Celio Dyer MD  Performed: other anesthesia staff   Preanesthetic Checklist  Completed: patient identified, site marked, surgical consent, pre-op evaluation, timeout performed, IV checked, risks and benefits discussed and monitors and equipment checked  Peripheral Block  Patient position: supine  Prep: ChloraPrep and site prepped and draped  Patient monitoring: heart rate, cardiac monitor, continuous pulse ox, continuous capnometry and frequent blood pressure checks  Block type: adductor canal  Laterality: right  Injection technique: continuous  Needle  Needle type: Tuohy   Needle gauge: 17 G  Needle length: 3.5 in  Needle localization: anatomical landmarks and ultrasound guidance  Catheter type: spring wound  Catheter size: 19 G  Test dose: lidocaine 1.5% with Epi 1-to-200,000 and negative   -ultrasound image captured on disc.  Assessment  Injection assessment: negative aspiration, negative parasthesia and local visualized surrounding nerve  Paresthesia pain: none  Heart rate change: no  Slow fractionated injection: yes  Additional Notes  VSS.  [facility] RN monitoring vitals throughout procedure.  Patient tolerated procedure well.

## 2019-03-04 NOTE — PLAN OF CARE
[facility]    HOME HEALTH ORDERS  FACE TO FACE ENCOUNTER    Patient Name: Keyona Herrera  YOB: 1955    PCP: Cher Palomo MD   PCP Address: [address] / [place]  PCP Phone Number: [phone]  PCP Fax: [phone]    Encounter Date: 03/04/2019    Admit to Home Health    Diagnoses:  Active Hospital Problems    Diagnosis  POA    Primary osteoarthritis of right knee [M17.11]  Yes      Resolved Hospital Problems   No resolved problems to display.       Future Appointments   Date Time Provider Department Center   3/19/2019  1:30 PM Sada Solorzano PA-C [facility]           I have seen and examined this patient face to face today. My clinical findings that support the need for the home health skilled services and home bound status are the following:  Weakness/numbness causing balance and gait disturbance due to Joint Replacement making it taxing to leave home.    Allergies:Review of patient's allergies indicates:  No Known Allergies    Diet: regular diet    Activities: activity as tolerated    Home Health Admitting Clinician:   SN/PT to complete comprehensive assessment including routine vital signs. Instruct on disease process and s/s of complications to report to MD. Follow specific home health arthoplasty protocol. Review/verify medication list sent home with the patient at time of discharge  and instruct patient/caregiver as needed. If coumadin ordered, coumadin clinic to manage INR with INR draws 2x per week with a goal to maintain INR between 1.8 and 2.2. Frequency may be adjusted depending on start of care date.    Notify MD if SBP > 160 or < 90; DBP > 90 or < 50; HR > 120 or < 50; Temp > 101    Home Medical Equipment:  Walker, 3-1 bedside commode, transfer tub bench    CONSULTS:    Physical Therapy may admit if patient not on coumadin, PT to perform comprehensive assessment if performing admit visit and generate therapy plan of care. Evaluate  for home safety and equipment needs; Establish/upgrade home exercise program. Perform/instruct on therapeutic exercises, gait training, transfer training, and Range of Motion.      OTHER: (only select if patient needs other therapy disciplines)  Occupational Therapy to evaluate and treat. Evaluate home environment for safety and equipment needs. Perform/Instruct on transfers, ADL training, ROM, and therapeutic exercises.   to evaluate for community resources/long-range planning.  Aide to provide assistance with personal care, ADLs, and vital signs.    MISCELLANEOUS CARE:  Routine Skin for Bedridden Patients: Instruct patient/caregiver to apply moisture barrier cream to all skin folds and wet areas in perineal area daily and after baths and all bowel movements.    WOUND CARE ORDERS:  Assess Surgical Incision/DSRG each TX  Aquacel AG drsg applied post-op leave on 14 days post op. Call MD if any drainage reaches border to border of drsg horizontally, s/s of infection, temp >101, induration, swelling or redness.  If dressing is removed per MD order, then apply island dressing, change/teach caregiver to perform daily dressing change if island dressing present.    Medications: Review discharge medications with patient and family and provide education.      Current Discharge Medication List      CONTINUE these medications which have NOT CHANGED    Details   aspirin (ECOTRIN) 81 MG EC tablet Take 81 mg by mouth every other day.       coenzyme Q10 100 mg capsule Take 100 mg by mouth once daily.      DM/acetaminophen/doxylamine (CORICIDIN HBP COLD-MULTI SYMPT ORAL) Take by mouth.      flaxseed oil 1,000 mg Cap Take by mouth. 1 Capsule Oral Every day      fluticasone (FLONASE) 50 mcg/actuation nasal spray 1 spray (50 mcg total) by Each Nare route once daily.  Qty: 1 Bottle, Refills: 0    Associated Diagnoses: Acute rhinosinusitis      ibuprofen (ADVIL,MOTRIN) 200 MG tablet Take 200 mg by mouth every 6 (six) hours  as needed for Pain.      ketorolac (TORADOL) 10 mg tablet Take 1 tablet (10 mg total) by mouth every 8 (eight) hours as needed for Pain.  Qty: 60 tablet, Refills: 0    Associated Diagnoses: Bilateral primary osteoarthritis of knee      meloxicam (MOBIC) 7.5 MG tablet Take 1 tablet (7.5 mg total) by mouth once daily. Take once a day for two weeks then as needed. Take with food  Qty: 30 tablet, Refills: 0      multivitamin capsule Take by mouth. 1 Capsule Oral Every day      omega-3 fatty acids 1,000 mg Cap Take by mouth. 1 Capsule Oral Every day      psyllium 0.52 gram capsule Take 0.52 g by mouth once daily.      meclizine (ANTIVERT) 25 mg tablet Take 1 tablet (25 mg total) by mouth 3 (three) times daily as needed for Dizziness.  Qty: 60 tablet, Refills: 1    Associated Diagnoses: BPPV (benign paroxysmal positional vertigo)             I certify that this patient is confined to her home and needs intermittent skilled nursing care, physical therapy and occupational therapy.

## 2019-03-05 VITALS
HEIGHT: 63 IN | TEMPERATURE: 99 F | SYSTOLIC BLOOD PRESSURE: 138 MMHG | HEART RATE: 80 BPM | RESPIRATION RATE: 18 BRPM | DIASTOLIC BLOOD PRESSURE: 84 MMHG | OXYGEN SATURATION: 93 % | BODY MASS INDEX: 38.8 KG/M2 | WEIGHT: 219 LBS

## 2019-03-05 LAB
BASOPHILS # BLD AUTO: 0.03 K/UL
BASOPHILS NFR BLD: 0.2 %
DIFFERENTIAL METHOD: ABNORMAL
EOSINOPHIL # BLD AUTO: 0 K/UL
EOSINOPHIL NFR BLD: 0 %
ERYTHROCYTE [DISTWIDTH] IN BLOOD BY AUTOMATED COUNT: 13.8 %
HCT VFR BLD AUTO: 37.5 %
HGB BLD-MCNC: 12 G/DL
IMM GRANULOCYTES # BLD AUTO: 0.06 K/UL
IMM GRANULOCYTES NFR BLD AUTO: 0.5 %
LYMPHOCYTES # BLD AUTO: 1.5 K/UL
LYMPHOCYTES NFR BLD: 11.5 %
MCH RBC QN AUTO: 29.1 PG
MCHC RBC AUTO-ENTMCNC: 32 G/DL
MCV RBC AUTO: 91 FL
MONOCYTES # BLD AUTO: 0.7 K/UL
MONOCYTES NFR BLD: 5.6 %
NEUTROPHILS # BLD AUTO: 10.8 K/UL
NEUTROPHILS NFR BLD: 82.2 %
NRBC BLD-RTO: 0 /100 WBC
PLATELET # BLD AUTO: 209 K/UL
PMV BLD AUTO: 11.1 FL
RBC # BLD AUTO: 4.13 M/UL
WBC # BLD AUTO: 13.08 K/UL

## 2019-03-05 PROCEDURE — 25000003 PHARM REV CODE 250: Performed by: PHYSICIAN ASSISTANT

## 2019-03-05 PROCEDURE — 97165 OT EVAL LOW COMPLEX 30 MIN: CPT

## 2019-03-05 PROCEDURE — 63600175 PHARM REV CODE 636 W HCPCS: Performed by: ORTHOPAEDIC SURGERY

## 2019-03-05 PROCEDURE — 97535 SELF CARE MNGMENT TRAINING: CPT

## 2019-03-05 PROCEDURE — 63600175 PHARM REV CODE 636 W HCPCS: Performed by: PHYSICIAN ASSISTANT

## 2019-03-05 PROCEDURE — 85025 COMPLETE CBC W/AUTO DIFF WBC: CPT

## 2019-03-05 PROCEDURE — 97161 PT EVAL LOW COMPLEX 20 MIN: CPT

## 2019-03-05 PROCEDURE — 36415 COLL VENOUS BLD VENIPUNCTURE: CPT

## 2019-03-05 PROCEDURE — 97116 GAIT TRAINING THERAPY: CPT

## 2019-03-05 PROCEDURE — 99212 PR OFFICE/OUTPT VISIT, EST, LEVL II, 10-19 MIN: ICD-10-PCS | Mod: ICN,,, | Performed by: ANESTHESIOLOGY

## 2019-03-05 PROCEDURE — 99212 OFFICE O/P EST SF 10 MIN: CPT | Mod: ICN,,, | Performed by: ANESTHESIOLOGY

## 2019-03-05 RX ADMIN — Medication 1500 MG: at 02:03

## 2019-03-05 RX ADMIN — ROPIVACAINE HYDROCHLORIDE 8 ML/HR: 2 INJECTION, SOLUTION EPIDURAL; INFILTRATION at 02:03

## 2019-03-05 RX ADMIN — MUPIROCIN 1 G: 20 OINTMENT TOPICAL at 09:03

## 2019-03-05 RX ADMIN — POLYETHYLENE GLYCOL 3350 17 G: 17 POWDER, FOR SOLUTION ORAL at 09:03

## 2019-03-05 RX ADMIN — Medication: at 12:03

## 2019-03-05 RX ADMIN — OXYCODONE HYDROCHLORIDE 5 MG: 5 TABLET ORAL at 09:03

## 2019-03-05 RX ADMIN — OXYCODONE HYDROCHLORIDE 5 MG: 5 TABLET ORAL at 02:03

## 2019-03-05 RX ADMIN — CELECOXIB 200 MG: 200 CAPSULE ORAL at 09:03

## 2019-03-05 RX ADMIN — STANDARDIZED SENNA CONCENTRATE AND DOCUSATE SODIUM 1 TABLET: 8.6; 5 TABLET, FILM COATED ORAL at 09:03

## 2019-03-05 RX ADMIN — ASPIRIN 81 MG: 81 TABLET, COATED ORAL at 09:03

## 2019-03-05 RX ADMIN — ACETAMINOPHEN 1000 MG: 500 TABLET ORAL at 12:03

## 2019-03-05 RX ADMIN — FAMOTIDINE 20 MG: 20 TABLET ORAL at 09:03

## 2019-03-05 RX ADMIN — ACETAMINOPHEN 1000 MG: 500 TABLET ORAL at 05:03

## 2019-03-05 NOTE — PLAN OF CARE
Problem: Occupational Therapy Goal  Goal: Occupational Therapy Goal  Goals to be met by: 3/15/19     Patient will increase functional independence with ADLs by performing:    UE Dressing with Modified [place].  LE Dressing with Supervision using A/E with RW to stand.  Grooming while standing with RW with Supervision.  Toileting from bedside commode with Supervision for hygiene and clothing management.   Supine to sit with Modified [place].  Stand pivot transfers with Supervision with RW to bed and DME..    Outcome: Ongoing (interventions implemented as appropriate)  JAROD Stone/DEJON      3/5/2019

## 2019-03-05 NOTE — ANESTHESIA POST-OP PAIN MANAGEMENT
Acute Pain Service Progress Note    03/05/2019    Pt and family present, consent to converting adductor PNC to On Q.  Site CDI.  Educated regarding continued pain management, fall risk, signs of complications, continued monitoring, as well as discontinuing catheter on 3/7.  Two numbers obtained for APS to follow up.  Understanding verbalized.    Uriel Ann  CA-1/PGY-2  PSH

## 2019-03-05 NOTE — PLAN OF CARE
Covering SW:    Referral send to [facility] for [facility]/PT assess and tx through [facility] [facility] [employer] fa[employer] system.  [facility] ph# [phone]/f#[phone].    Pt is scheduled to dc today w/spouse(Lalo Herrera,  cell# [phone]).    Enid Watson, [facility]  [id number]

## 2019-03-05 NOTE — PROGRESS NOTES
[facility]  Orthopedics  Progress Note    Patient Name: Keyona Herrera  MRN: [id number]  Admission Date: 3/4/2019  Hospital Length of Stay: 1 days  Attending Provider: [facility]., MD  Primary Care Provider: Cher Palomo MD  Follow-up For: Procedure(s) (LRB):  REPLACEMENT-KNEE-TOTAL (Right)    Post-Operative Day: 1 Day Post-Op  Subjective:     Principal Problem:Primary osteoarthritis of right knee    Principal Orthopedic Problem: s/p right tka    Interval History: Patient seen and examined at bedside. Day 1 s/p righyt tka doing well Ambulated with PT yesterday.   No acute events overnight.  Pain controlled.        Review of patient's allergies indicates:  No Known Allergies    Current Facility-Administered Medications   Medication    acetaminophen tablet 1,000 mg    aspirin EC tablet 81 mg    bisacodyl suppository 10 mg    celecoxib capsule 200 mg    famotidine tablet 20 mg    fluticasone 50 mcg/actuation nasal spray 50 mcg    morphine injection 2 mg    morphine injection 2 mg    morphine injection 2 mg    mupirocin 2 % ointment 1 g    naloxone 0.4 mg/mL injection 0.02 mg    ondansetron injection 4 mg    oxyCODONE immediate release tablet 10 mg    oxyCODONE immediate release tablet 15 mg    oxyCODONE immediate release tablet 5 mg    polyethylene glycol packet 17 g    pregabalin capsule 150 mg    promethazine (PHENERGAN) 6.25 mg in dextrose 5 % 50 mL IVPB    ramelteon tablet 8 mg    ropivacaine (PF) 2 mg/ml (0.2%) infusion    ropivacaine 0.2% ON-Q C-BLOC 400 ML (SELECT A FLOW)    senna-docusate 8.6-50 mg per tablet 1 tablet    sodium chloride 0.9% flush 3 mL     Objective:     Vital Signs (Most Recent):  Temp: 98.6 °F (37 °C) (03/05/19 0735)  Pulse: 73 (03/05/19 0735)  Resp: 18 (03/05/19 0735)  BP: 112/61 (03/05/19 0735)  SpO2: (!) 93 % (03/05/19 0735) Vital Signs (24h Range):  Temp:  [96.3 °F (35.7 °C)-98.6 °F (37 °C)] 98.6 °F (37 °C)  Pulse:  [65-90] 73  Resp:   "[10-20] 18  SpO2:  [91 %-100 %] 93 %  BP: (106-147)/(56-80) 112/61     Weight: 99.3 kg (219 lb)  Height: 5' 3" (160 cm)  Body mass index is 38.79 kg/m².      Intake/Output Summary (Last 24 hours) at 3/5/2019 0748  Last data filed at 3/5/2019 0400  Gross per 24 hour   Intake 4142.33 ml   Output --   Net 4142.33 ml       Ortho/SPM Exam  Physical Exam:  NAD, A/O x 3.  Wound c/d/i with clean dressing.  No focal motor or sensory deficits noted.      Significant Labs: CBC: No results for input(s): WBC, HGB, HCT, PLT in the last 48 hours.    Significant Imaging: I have reviewed and interpreted all pertinent imaging results/findings.    Assessment/Plan:     * Primary osteoarthritis of right knee    Keyona Herrera is a 63 y.o. female POD 1 s/p right TKA doing well.        - Weight bearing status: wbat  - Pain control: Per APS  - Antibiotics: post op vanc/ancef  - DVT Prophylaxis: asa 81 bid , SCD's at all times when not ambulating.  - PT/OT  - Lines/Drains: none  - Dispo: home with  today                 Ej Lance MD  Orthopedics  [facility]  "

## 2019-03-05 NOTE — ASSESSMENT & PLAN NOTE
Keyona Herrera is a 63 y.o. female POD 1 s/p right TKA doing well.        - Weight bearing status: wbat  - Pain control: Per APS  - Antibiotics: post op vanc/ancef  - DVT Prophylaxis: asa 81 bid , SCD's at all times when not ambulating.  - PT/OT  - Lines/Drains: none  - Dispo: home with HH today

## 2019-03-05 NOTE — ADDENDUM NOTE
Addendum  created 03/05/19 1448 by Archie Domínguez MD    Charge Capture section accepted, Sign clinical note

## 2019-03-05 NOTE — PT/OT/SLP EVAL
Occupational Therapy   Evaluation / Treatment    Name: Keyona Herrera  MRN: [id number]  Admitting Diagnosis:  Primary osteoarthritis of right knee 1 Day Post-Op    Recommendations:     Discharge Recommendations: other (see comments)(HHOT)  Discharge Equipment Recommendations:  walker, rolling, commode, shower chair  Barriers to discharge:  None    Assessment:     Keyona Herrera is a 63 y.o. female with a medical diagnosis of Primary osteoarthritis of right knee.  She presents with performance deficits affecting function including : weakness, impaired endurance, impaired self care skills, impaired functional mobilty, gait instability, pain, decreased ROM, orthopedic precautions, impaired cardiopulmonary response to activity.      Rehab Prognosis: Good; patient would benefit from acute skilled OT services to address these deficits and reach maximum level of function.       Pt presented with a low complexity OT evaluation.  Pt required a brief an expanded review of medical history and occupational profile.  Pt demod 1-3 performance deficits (physical, cognitive, or psychosocial) resulting in limitations and engagement restrictions.  Clinical decision making required low analytical complexity with limited treatment options.  Pt with no cormorbidities and required no modification of task/assistance with assessment.      Physical- skills refer to impairments of body structure or functions, balance, mobility; strength, endurance, FMC, GMC, sensation, dexterity, and posture.  Cognitive- skills refer to ability to attend, communicate, perceive, think, understand, problem solve, mentally sequence, learn, and remember resulting in ability to organize occupational performance in timely and safe manner.    Psychosocial- skills refer to interpersonal interactions, habits, routines, and behaviors, active use of coping strategies, and environmental adaptations to appropriately participate in everyday tasks and situations.    Plan:     Patient to be seen 3 x/week to address the above listed problems via self-care/home management, therapeutic activities, therapeutic exercises  · Plan of Care Expires:  4/5/19  · Plan of Care Reviewed with: patient, spouse    Subjective     Chief Complaint: (R) TKA  Patient/Family Comments/goals: Pt wants to return to PLOF    Occupational Profile:  Living Environment: Pt lives in 2 story home with . Home has 4 CAT in front and 1 CAT from garage.  Pt's  has set up living quarters on 1st floor for Pt but there is only a half bath on 1st floor. Pt was (I) in all aspects PTA and was working full time as a .  will be home with Pt for a week post D/C and their daughter will be with Pt from that point on until no longer needed to assist..   Roles and Routines: Wife, employee, and mother.  Equipment Used at Home:  none      Pain/Comfort:  · Pain Rating 1: 4/10  · Location - Side 1: Right  · Location - Orientation 1: generalized  · Location 1: knee  · Pain Addressed 1: Reposition  · Pain Rating Post-Intervention 1: 4/10    Patients cultural, spiritual, [employer] conflicts given the current situation: no    Objective:     Communicated with: nurse prior to session.  Patient found supine with FCD, cryotherapy upon OT entry to room.    General Precautions: Standard, fall   Orthopedic Precautions:RLE weight bearing as tolerated   Braces:       Occupational Performance:    Bed Mobility:    · Patient completed Rolling/Turning to Left with  stand by assistance  · Patient completed Scooting/Bridging with stand by assistance  · Patient completed Supine to Sit with stand by assistance    Functional Mobility/Transfers:  · Patient completed Sit <> Stand Transfer with contact guard assistance  with  rolling walker   · Patient completed Bed <> Chair Transfer using Stand Pivot technique with contact guard assistance with rolling walker  · Patient completed Toilet Transfer Stand Pivot technique  with contact guard assistance with  rolling walker  · Functional Mobility: Pt ambulated from EOB to restroom with RW and SBA a distance of ~15ft then back to bedside chair ~15 ft with same level of assist.    Activities of Daily Living:  · Grooming: stand by assistance standing with RW sinkside to wash hand .  · Upper Body Dressing: supervision set up only.  · Lower Body Dressing: minimum assistance with (A) to don /doff (R) sock and to thread (R) foot into panties. RW to steady when standing to manage clothing over hips with RW and CGA provided.  · Toileting: contact guard assistance when standing to manage panties over hips with BSC and RW for safety.    Cognitive/Visual Perceptual:  Cognitive/Psychosocial Skills:     -       Oriented to: Person, Place, Time and Situation   -       Follows Commands/attention:Follows multistep  commands  -       Communication: clear/fluent  -       Memory: No Deficits noted  -       Safety awareness/insight to disability: intact   -       Mood/Affect/Coping skills/emotional control: Appropriate to situation  Visual/Perceptual:      -Intact in all aspects    Physical Exam:  Balance: -       WFLs seated with SBA when standing with RW.  Postural examination/scapula alignment:    -       No postural abnormalities identified  Skin integrity: Visible skin intact  Edema:  None noted  Sensation:    -       Intact  Motor Planning: -       WFLS  Dominant hand: -       Right  Upper Extremity Range of Motion:     -       Right Upper Extremity: WNL  -       Left Upper Extremity: WNL  Upper Extremity Strength:    -       Right Upper Extremity: WNL  -       Left Upper Extremity: WNL   Strength:    -       Right Upper Extremity: WFL  -       Left Upper Extremity: WFL  Fine Motor Coordination:    -       Intact  Neurological: -       Grossly intact      Treatment & Education:  Pt and  edu on Plan of care,  safety when performing functional transfers, self care tasks and functional  standing activities needing A/D to steady..  - White board updated  - Self care tasks completed-- as noted above    Education:    Patient left up in chair with all lines intact, call button in reach and  present    [facility] 6 Click ADL:  [facility] Total Score: 19      GOALS:   Multidisciplinary Problems     Occupational Therapy Goals        Problem: Occupational Therapy Goal    Goal Priority Disciplines Outcome Interventions   Occupational Therapy Goal     OT, PT/OT Ongoing (interventions implemented as appropriate)    Description:  Goals to be met by: 3/15/19     Patient will increase functional independence with ADLs by performing:    UE Dressing with Modified [place].  LE Dressing with Supervision using A/E with RW to stand.  Grooming while standing with RW with Supervision.  Toileting from bedside commode with Supervision for hygiene and clothing management.   Supine to sit with Modified [place].  Stand pivot transfers with Supervision with RW to bed and DME..                      History:     Past Medical History:   Diagnosis Date    HTN (hypertension)     Hyperlipidemia     Obesity        Past Surgical History:   Procedure Laterality Date     SECTION      2    CHOLECYSTECTOMY      COLONOSCOPY N/A 2016    Performed by Edy Chanel MD at [facility]    HYSTERECTOMY      INCISIONAL BREAST BIOPSY      surgery on foot         Time Tracking:     OT Date of Treatment: 19  OT Start Time: 844  OT Stop Time: 912  OT Total Time (min): 28 min    Billable Minutes:Evaluation 18  Self Care/Home Management 10    Uriel Fuentes OTR/L  3/5/2019

## 2019-03-05 NOTE — PLAN OF CARE
Problem: Physical Therapy Goal  Goal: Physical Therapy Goal  Goals to be met by: 3/15/2019    Patient will increase functional independence with mobility by performing:    Supine <> sit with Modified [place].  Sit <> stand transfer with Supervision using Rolling Walker.  Bed <> chair transfer via Stand Pivot with Supervision using Rolling Walker.  Gait  x 100 feet with Supervision using Rolling Walker to prepare for community ambulation and endurance activities.  Able to tolerate exercise for 15-20 reps with independence.  Ascend/descend 1 stairs with right Handrails Contact Guard Assistance using Rolling Walker.      Outcome: Ongoing (interventions implemented as appropriate)    Pt would benefit from HHPT upon discharge.  Appropriate transfer level with nursing staff: Bed <> Chair:  Stand Pivot with stand by assistance with RW.    Gabriella Rosa PT, DPT  3/5/2019  Pager: [phone]

## 2019-03-05 NOTE — ANESTHESIA POSTPROCEDURE EVALUATION
"Anesthesia Post Evaluation    Patient: Keyona Herrera    Procedure(s) Performed: Procedure(s) (LRB):  REPLACEMENT-KNEE-TOTAL (Right)    Final Anesthesia Type: spinal  Patient location during evaluation: PACU  Patient participation: Yes- Able to Participate  Level of consciousness: awake and alert and oriented  Post-procedure vital signs: reviewed and stable  Pain management: adequate  Airway patency: patent  PONV status at discharge: No PONV  Anesthetic complications: no      Cardiovascular status: blood pressure returned to baseline  Respiratory status: unassisted  Hydration status: euvolemic  Follow-up not needed.        Visit Vitals  BP (!) 142/65   Pulse 80   Temp 36.1 °C (97 °F) (Temporal)   Resp 16   Ht 5' 3" (1.6 m)   Wt 99.3 kg (219 lb)   SpO2 96%   Breastfeeding? No   BMI 38.79 kg/m²       Pain/Bell Score: Pain Rating Prior to Med Admin: 8 (3/4/2019  6:34 PM)  Pain Rating Post Med Admin: 1 (3/4/2019  5:30 PM)  Bell Score: 9 (3/4/2019  5:30 PM)        "

## 2019-03-05 NOTE — SUBJECTIVE & OBJECTIVE
"Principal Problem:Primary osteoarthritis of right knee    Principal Orthopedic Problem: s/p right tka    Interval History: Patient seen and examined at bedside. Day 1 s/p righyt tka doing well Ambulated with PT yesterday.   No acute events overnight.  Pain controlled.        Review of patient's allergies indicates:  No Known Allergies    Current Facility-Administered Medications   Medication    acetaminophen tablet 1,000 mg    aspirin EC tablet 81 mg    bisacodyl suppository 10 mg    celecoxib capsule 200 mg    famotidine tablet 20 mg    fluticasone 50 mcg/actuation nasal spray 50 mcg    morphine injection 2 mg    morphine injection 2 mg    morphine injection 2 mg    mupirocin 2 % ointment 1 g    naloxone 0.4 mg/mL injection 0.02 mg    ondansetron injection 4 mg    oxyCODONE immediate release tablet 10 mg    oxyCODONE immediate release tablet 15 mg    oxyCODONE immediate release tablet 5 mg    polyethylene glycol packet 17 g    pregabalin capsule 150 mg    promethazine (PHENERGAN) 6.25 mg in dextrose 5 % 50 mL IVPB    ramelteon tablet 8 mg    ropivacaine (PF) 2 mg/ml (0.2%) infusion    ropivacaine 0.2% ON-Q C-BLOC 400 ML (SELECT A FLOW)    senna-docusate 8.6-50 mg per tablet 1 tablet    sodium chloride 0.9% flush 3 mL     Objective:     Vital Signs (Most Recent):  Temp: 98.6 °F (37 °C) (03/05/19 0735)  Pulse: 73 (03/05/19 0735)  Resp: 18 (03/05/19 0735)  BP: 112/61 (03/05/19 0735)  SpO2: (!) 93 % (03/05/19 0735) Vital Signs (24h Range):  Temp:  [96.3 °F (35.7 °C)-98.6 °F (37 °C)] 98.6 °F (37 °C)  Pulse:  [65-90] 73  Resp:  [10-20] 18  SpO2:  [91 %-100 %] 93 %  BP: (106-147)/(56-80) 112/61     Weight: 99.3 kg (219 lb)  Height: 5' 3" (160 cm)  Body mass index is 38.79 kg/m².      Intake/Output Summary (Last 24 hours) at 3/5/2019 0748  Last data filed at 3/5/2019 0400  Gross per 24 hour   Intake 4142.33 ml   Output --   Net 4142.33 ml       Ortho/SPM Exam  Physical Exam:  NAD, A/O x 3.  Wound " c/d/i with clean dressing.  No focal motor or sensory deficits noted.      Significant Labs: CBC: No results for input(s): WBC, HGB, HCT, PLT in the last 48 hours.    Significant Imaging: I have reviewed and interpreted all pertinent imaging results/findings.

## 2019-03-05 NOTE — ANESTHESIA POST-OP PAIN MANAGEMENT
Acute Pain Service and Perioperative Surgical Home Progress Note    HPI  Keyona Herrera is a 63 y.o., female with HTN, HLD, and CARMINE. S/p R TKA.    Interval history  NAEON.    Surgery:  Procedure(s) (LRB):  REPLACEMENT-KNEE-TOTAL (Right)    Post Op Day #: 1    Catheter type: Perineural Adductor Canal    Infusion type: Ropivacaine 0.2%  8 ml/hr basal    Problem List:    Active Hospital Problems    Diagnosis  POA    *Primary osteoarthritis of right knee [M17.11]  Yes      Resolved Hospital Problems   No resolved problems to display.       Subjective:  No concerns/complaints this AM. Patient eager to be discharged home.     General appearance of alert, oriented, no complaints   Pain with rest: 1    Numbers   Pain with movement: 5    Numbers   Side Effects    1. Pruritis No    2. Nausea No    3. Motor Blockade No, 0=Ability to raise lower extremities off bed    4. Sedation No, 1=awake and alert    Schedule Medications:    acetaminophen  1,000 mg Oral Q6H    aspirin  81 mg Oral BID    celecoxib  200 mg Oral Daily    famotidine  20 mg Oral BID    fluticasone  1 spray Each Nare Daily    mupirocin  1 g Nasal BID    polyethylene glycol  17 g Oral Daily    pregabalin  150 mg Oral QHS    senna-docusate 8.6-50 mg  1 tablet Oral BID        Continuous Infusions:   ropivacaine (PF) 2 mg/ml (0.2%) 8 mL/hr (03/05/19 0203)    ropivacaine          PRN Medications:  bisacodyl, morphine, morphine, morphine, naloxone, ondansetron, oxyCODONE, oxyCODONE, oxyCODONE, promethazine (PHENERGAN) IVPB, ramelteon, ropivacaine, sodium chloride 0.9%       Antibiotics:  Antibiotics (From admission, onward)    Start     Stop Route Frequency Ordered    03/04/19 2100  mupirocin 2 % ointment 1 g      03/09 2059 Nasl 2 times daily 03/04/19 1051             Objective:     Catheter site clean, dry, intact          Vital Signs (Most Recent):  Temp: 37 °C (98.6 °F) (03/05/19 0735)  Pulse: 73 (03/05/19 0735)  Resp: 18 (03/05/19 0735)  BP:  112/61 (03/05/19 0735)  SpO2: (!) 93 % (03/05/19 0735) Vital Signs Range (Last 24H):  Temp:  [35.7 °C (96.3 °F)-37 °C (98.6 °F)]   Pulse:  [65-90]   Resp:  [10-20]   BP: (106-147)/(56-80)   SpO2:  [91 %-100 %]          I & O (Last 24H):    Intake/Output Summary (Last 24 hours) at 3/5/2019 0838  Last data filed at 3/5/2019 0400  Gross per 24 hour   Intake 4142.33 ml   Output --   Net 4142.33 ml       Physical Exam:    GA: Alert, comfortable, no acute distress.   Pulmonary: Clear to auscultation A/P/L. No wheezing, crackles, or rhonchi.  Cardiac: RRR S1 & S2 w/o rubs/murmurs/gallops.   Abdominal:Bowel sounds present. No tenderness to palpation or distension. No appreciable hepatosplenomegaly.   Skin: No jaundice, rashes, or visible lesions.         Laboratory:  CBC:   Recent Labs     03/05/19  0757   WBC 13.08*   RBC 4.13   HGB 12.0   HCT 37.5      MCV 91   MCH 29.1   MCHC 32.0       BMP:   Recent Labs     03/04/19  1624      K 4.4   CO2 24      BUN 11   CREATININE 0.7      CALCIUM 8.6*       No results for input(s): PT, INR, PROTIME, APTT in the last 72 hours.      Anticoagulant dose ASA 81mg BID    Assessment:         Pain control adequate    Plan:     1) Pain: Adductor canal perineural catheter in place and infusing. Good level. Multimodal pain regimen with acetaminophen, Celebrex, Lyrica, and prn oxycodone given  Will continue to monitor. Plan to D/C perineural catheter in AM or home with On-Q if patient discharged today.   2) HTN: HDS, not on home antihypertensives   3) CARMINE: No CPAP used overnight.   4) FEN/GI: Tolerating liquids, advance diet as tolerated. + flatus. No BM.   5) Dispo: Pt working well with PT/OT. Case management and SW for placement. Plan for D/C tomorrow morning or possibly today if patient works well with PT and meets goals.          Evaluator Uriel Ann MD          I have seen the patient. I agree with the resident findings above.     Archie Domínguez,  MD

## 2019-03-05 NOTE — PT/OT/SLP EVAL
Physical Therapy Evaluation    Patient Name:  Keyona Herrera   MRN:  [id number]  Admitting Diagnosis:  Primary osteoarthritis of right knee   Recent Surgery: Procedure(s) (LRB):  REPLACEMENT-KNEE-TOTAL (Right) 1 Day Post-Op    Recommendations:     Discharge Recommendations:  (HHPT)   Discharge Equipment Recommendations: walker, rolling, commode   Barriers to discharge: None    Plan:     During this hospitalization, patient to be seen 3 x/week to address the above listed problems via gait training, therapeutic activities, therapeutic exercises, neuromuscular re-education  · Plan of Care Expires:  04/04/19   Plan of Care Reviewed with: patient    Assessment:     Keyona Herrera is a 63 y.o. female admitted with a medical diagnosis of Primary osteoarthritis of right knee.  She presents with the following impairments/functional limitations: decreased functional mobility due to pain. Pt with good mobility and balance. Keyona Langfords deficits effect their ability to safely and independently participate in self-care tasks and functional mobility which increases their caregiver burden. Due to her physical therapy diagnosis of debility and deconditioning, they continue to benefit from acute PT services to address the following limitations: high fall risk, weakness, instability, and the need for supervised instruction of exercise. Keyona Herrera's deficits effect their roles and responsibilities in which they were able to complete prior to admit. Education was provided to patient & family regarding importance of continued participation in therapy, patient's progress and discharge disposition. Pt would benefit from an intensive and collaborative PT/OT/SLP therapy program to improve quality of life and focus on recovery of impairments.     Problem List: weakness, impaired self care skills, impaired endurance, impaired functional mobilty, pain, impaired cardiopulmonary response to activity, impaired  balance, decreased ROM, edema, orthopedic precautions  Rehab Prognosis: Good     GOALS:   Multidisciplinary Problems     Physical Therapy Goals        Problem: Physical Therapy Goal    Goal Priority Disciplines Outcome Goal Variances Interventions   Physical Therapy Goal     PT, PT/OT Ongoing (interventions implemented as appropriate)     Description:  Goals to be met by: 3/15/2019    Patient will increase functional independence with mobility by performing:    Supine <> sit with Modified [place].  Sit <> stand transfer with Supervision using Rolling Walker.  Bed <> chair transfer via Stand Pivot with Supervision using Rolling Walker.  Gait  x 100 feet with Supervision using Rolling Walker to prepare for community ambulation and endurance activities.  Able to tolerate exercise for 15-20 reps with independence.  Ascend/descend 1 stairs with right Handrails Contact Guard Assistance using Rolling Walker.                        History:   Living Environment:  Patient lives with her  in a two story home with 4 CAT.  reports they can go through garage with 1SH, made accomodation for sleeping on first floor. Patient reports being indpendent with mobility & with ADLs. Patient uses DME as follows: none. DME owned (not currently used): none.  Hand Dominance: right    Roles/Repsonsibilities: Work: yes. Drive: yes. Managing Medicines/Managing Home: yes. Hobbies: none stated.    Upon discharge, patient will have assistance from family.    Past Medical History:   Diagnosis Date    HTN (hypertension)     Hyperlipidemia     Obesity        Past Surgical History:   Procedure Laterality Date     SECTION      2    CHOLECYSTECTOMY      COLONOSCOPY N/A 2016    Performed by Edy Chanel MD at [facility]    HYSTERECTOMY      INCISIONAL BREAST BIOPSY      surgery on foot         Subjective     Communicated with RN prior to session.  Patient found supine upon PT entry to room, agreeable to evaluation.   "Pt's  present throughout session.  "I'm ready to go home today."    Pain/Comfort:  · Pain Rating 1: 4/10  · Location - Side 1: Right  · Location - Orientation 1: generalized  · Location 1: knee  · Pain Addressed 1: Reposition  · Pain Rating Post-Intervention 1: 4/10    Objective:     Patient found with: FCD, cryotherapy(epidural catheter)     General Precautions: Standard, Cardiac fall   Orthopedic Precautions:RLE weight bearing as tolerated   Braces:     Body mass index is 38.79 kg/m².  Respiratory Status: room air  Vital Signs (Most Recent):    Temp: 98.6 °F (37 °C) (03/05/19 0735)  Pulse: 73 (03/05/19 0735)  Resp: 18 (03/05/19 0735)  BP: 112/61 (03/05/19 0735)  SpO2: (!) 93 % (03/05/19 0735)    Exam:  · Mental Status: Patient is AxOx4 and follows all multi-step verbal commands. Pt is Alert and Cooperative during session.  · Skin Integrity: Visible skin intact  · Edema: moderate of R knee  · Sensation: Intact  · Hearing: Intact  · Vision:  Intact  · Postural Assessment: rounded shoulders  · Range of Motion:  · RUE: WFL  · LUE: WFL  · RLE: WFL  · LLE: WFL  · Strength Exam:  · Bilateral Upper Extremity Strength: grossly WFL  · Bilateral Lower Extremity Strength: grossly WFL    Functional Mobility:  Rehab tech present: no, OT present  Bed Mobility:   · Supine to Sit: minimum assistance; from left side of bed    Transfers:   · Sit <> Stand Transfer: contact guard assistance with rolling walker   · Stand <> Sit Transfer: contact guard assistance with rolling walker   · x1 from EOB, x1 from toilet      Gait:  · Patient ambulated: 25ft within room   · Patient required: contact guard  · Patient used:  rolling walker  · Gait Pattern observed: 2-point gait  · Gait Deviation(s): decreased gricelda, decreased step length, decreased stride length and decreased weight-shifting ability  · Impairments due to: R knee pain and decreased R knee ROM.    Therapeutic Activities & Exercises:   Education:  Patient provided with " daily orientation and goals of this PT session. Patient and family agreed to participate in session. Patient and family aware of patient's deficits and therapy progression. They were educated to transfer to bedside chair/bedside commode/bathroom with RN/PCT present. Encouraged patient to perform daily exercises & mobility to increase endurance and decrease effects of bedrest. Time provided for therapeutic counseling and discussion of health disposition. All questions answered to patient's and family's satisfaction, within scope of PT practice; voiced no other concerns. White board updated in patient's room, RN notified of session.    Patient left up in chair, with head in midline, neutral pelvis & heels floated for skin protection with all lines intact, call button in reach and RN notified.    AM-PAC 6 CLICK MOBILITY  Total Score:18     Time Tracking:     PT Received On: 03/05/19  PT Start Time: 0843     PT Stop Time: 0912  PT Total Time (min): 29 min     Billable Minutes: Evaluation 15 and Gait Training 14    Gabriella Rosa PT, DPT  03/05/2019  Pager:[phone]

## 2019-03-06 ENCOUNTER — TELEPHONE (OUTPATIENT)
Dept: ORTHOPEDICS | Facility: CLINIC | Age: 64
End: 2019-03-06

## 2019-03-06 PROCEDURE — G0180 PR HOME HEALTH MD CERTIFICATION: ICD-10-PCS | Mod: ,,, | Performed by: FAMILY MEDICINE

## 2019-03-06 PROCEDURE — G0180 MD CERTIFICATION HHA PATIENT: HCPCS | Mod: ,,, | Performed by: FAMILY MEDICINE

## 2019-03-06 NOTE — DISCHARGE SUMMARY
"[facility]  Orthopedics  Discharge Summary      Patient Name: Keyona Herrera  MRN: [id number]  Admission Date: 3/4/2019  Hospital Length of Stay: 1 days  Discharge Date and Time: 3/5/2019  2:29 PM  Attending Physician: No att. providers found   Discharging Provider: Ej Lance MD  Primary Care Provider: Cher Palomo MD    HPI: see hpi    Procedure(s) (LRB):  REPLACEMENT-KNEE-TOTAL (Right)      Hospital Course: Patient presented for above procedure.  Tolerated it well and was discharged home POD 1 after voiding, tolerating diet, ambulating, pain controlled.  Discharge instructions, follow-up appointment, and med rec are below.          Significant Diagnostic Studies: No pertinent studies.    Pending Diagnostic Studies:     None        Final Active Diagnoses:    Diagnosis Date Noted POA    PRINCIPAL PROBLEM:  Primary osteoarthritis of right knee [M17.11] 03/04/2019 Yes      Problems Resolved During this Admission:      Discharged Condition: stable    Disposition: Home or Self Care    Follow Up:  Follow-up Information     [facility] In 1 day.    Specialties:  Home Health Services, Hospice and Palliative Medicine, Hospice Services, Physical Therapy  Why:  RN to eval/tx and PT to eval and tx.  Contact information:  [address] [id number] [phone]                 Patient Instructions:      3 IN 1 COMMODE FOR HOME USE     Order Specific Question Answer Comments   Type: Standard    Height: 5' 3" (1.6 m)    Weight: 99.8 kg (220 lb 0.3 oz)    Does patient have medical equipment at home? none    Length of need (1-99 months): 99    Vendor: Other (use comments)    Expected Date of Delivery: 3/6/2019      TRANSFER TUB BENCH FOR HOME USE     Order Specific Question Answer Comments   Type of Transfer Tub Bench: Unpadded    Height: 5' 3" (1.6 m)    Weight: 99.8 kg (220 lb 0.3 oz)    Does patient have medical equipment at home? none    Length of need (1-99 " "months): 99    Vendor: Other (use comments)    Expected Date of Delivery: 3/6/2019      WALKER FOR HOME USE     Order Specific Question Answer Comments   Type of Walker: Adult (5'4"-6'6")    With wheels? Yes    Height: 5' 3" (1.6 m)    Weight: 99.8 kg (220 lb 0.3 oz)    Length of need (1-99 months): 99    Does patient have medical equipment at home? none    Please check all that apply: Walker will be used for gait training.    Vendor: Other (use comments)    Expected Date of Delivery: 3/6/2019      Call MD for:  temperature >100.4     Call MD for:  persistent nausea and vomiting or diarrhea     Call MD for:  severe uncontrolled pain     Call MD for:  redness, tenderness, or signs of infection (pain, swelling, redness, odor or green/yellow discharge around incision site)     Call MD for:  difficulty breathing or increased cough     Call MD for:  severe persistent headache     Call MD for:  worsening rash     Call MD for:  persistent dizziness, light-headedness, or visual disturbances     Call MD for:  increased confusion or weakness     Activity as tolerated     Call MD for:  difficulty breathing, headache or visual disturbances     Call MD for:  extreme fatigue     Call MD for:  hives     Call MD for:  persistent dizziness or light-headedness     Call MD for:  persistent nausea and vomiting     Call MD for:  redness, tenderness, or signs of infection (pain, swelling, redness, odor or green/yellow discharge around incision site)     Call MD for:  severe uncontrolled pain     Call MD for:  temperature >100.4     Keep surgical extremity elevated     Leave dressing on - Keep it clean, dry, and intact until clinic visit   Order Comments: Do not remove surgical dressing for 2 weeks post-op. This will be done only by MD at initial post-op visit. If dressing is completely saturated, replace with identical dressing - silver-impregnated hydrocolloid dressing.     Do not get dressings wet. Do not shower.     If dressing " continues to be saturated or there are signs of infection, please call [facility] [phone] for further instructions and to make appt to be seen.     Lifting restrictions   Order Comments: No strenuous exercise or lifting of > 10 lbs     No driving, operating heavy equipment or signing legal documents while taking pain medication     Sponge bath only until clinic visit     Weight bearing as tolerated     Medications:  Reconciled Home Medications:      Medication List      START taking these medications    docusate sodium 100 MG capsule  Commonly known as:  COLACE  Take 1 capsule (100 mg total) by mouth 2 (two) times daily as needed.     oxyCODONE-acetaminophen  mg per tablet  Commonly known as:  PERCOCET  Take 1 tablet by mouth every 4 (four) hours as needed for Pain.        CHANGE how you take these medications    * aspirin 81 MG EC tablet  Commonly known as:  ECOTRIN  Take 1 tablet (81 mg total) by mouth 2 (two) times daily.  What changed:  when to take this     * aspirin 325 MG EC tablet  Commonly known as:  ECOTRIN  Take 1 tablet (325 mg total) by mouth 2 (two) times daily.  What changed:  You were already taking a medication with the same name, and this prescription was added. Make sure you understand how and when to take each.         * This list has 2 medication(s) that are the same as other medications prescribed for you. Read the directions carefully, and ask your doctor or other care provider to review them with you.            CONTINUE taking these medications    coenzyme Q10 100 mg capsule  Take 100 mg by mouth once daily.     CORICIDIN HBP COLD-MULTI SYMPT ORAL  Take by mouth.     flaxseed oil 1,000 mg Cap  Take by mouth. 1 Capsule Oral Every day     fluticasone 50 mcg/actuation nasal spray  Commonly known as:  FLONASE  1 spray (50 mcg total) by Each Nare route once daily.     ibuprofen 200 MG tablet  Commonly known as:  ADVIL,MOTRIN  Take 200 mg by mouth every 6 (six) hours as needed for  Pain.     ketorolac 10 mg tablet  Commonly known as:  TORADOL  Take 1 tablet (10 mg total) by mouth every 8 (eight) hours as needed for Pain.     meclizine 25 mg tablet  Commonly known as:  ANTIVERT  Take 1 tablet (25 mg total) by mouth 3 (three) times daily as needed for Dizziness.     meloxicam 7.5 MG tablet  Commonly known as:  MOBIC  Take 1 tablet (7.5 mg total) by mouth once daily. Take once a day for two weeks then as needed. Take with food     multivitamin capsule  Take by mouth. 1 Capsule Oral Every day     omega-3 fatty acids 1,000 mg Cap  Take by mouth. 1 Capsule Oral Every day     psyllium 0.52 gram capsule  Take 0.52 g by mouth once daily.            Ej Lance MD  Orthopedics  [facility]

## 2019-03-06 NOTE — ANESTHESIA POST-OP PAIN MANAGEMENT
Acute Pain Service Progress Note    03/06/2019    Called patient to discuss on-q catheter. No answer, message was left in voicemail to contact the department with any concerns or questions.    Will call again tomorrow.    Quan Mcghee, CA3

## 2019-03-06 NOTE — TELEPHONE ENCOUNTER
----- Message from Antonella Zheng sent at 3/6/2019  9:24 AM CST -----  Contact: Self  Patient says she has not been contacted by physical therapy yet. She says it is supposed to be home health physical therapy.  [phone]        Via the discharge note.... I contacted the vendor   They said hopefully today she will be seen & offered to call her themselves      I said sure &  thanks

## 2019-03-07 NOTE — PT/OT/SLP DISCHARGE
Physical Therapy Discharge Summary    Name: Keyona Herrera  MRN: [id number]   Principal Problem: Primary osteoarthritis of right knee     Patient Discharged from acute Physical Therapy on 3/7/19.  Please refer to prior PT noted date on 3/5/19 for functional status.     Assessment:     Patient was discharged unexpectedly.  Information required to complete an accurate discharge summary is unknown.  Refer to therapy initial evaluation and last progress note for initial and most recent functional status and goal achievement.  Recommendations made may be found in medical record.    Objective:     GOALS:   Multidisciplinary Problems     Physical Therapy Goals        Problem: Physical Therapy Goal    Goal Priority Disciplines Outcome Goal Variances Interventions   Physical Therapy Goal     PT, PT/OT Ongoing (interventions implemented as appropriate)     Description:  Goals to be met by: 3/15/2019    Patient will increase functional independence with mobility by performing:    Supine <> sit with Modified [place].  Sit <> stand transfer with Supervision using Rolling Walker.  Bed <> chair transfer via Stand Pivot with Supervision using Rolling Walker.  Gait  x 100 feet with Supervision using Rolling Walker to prepare for community ambulation and endurance activities.  Able to tolerate exercise for 15-20 reps with independence.  Ascend/descend 1 stairs with right Handrails Contact Guard Assistance using Rolling Walker.                        Reasons for Discontinuation of Therapy Services  Transfer to alternate level of care.      Plan:     Patient Discharged to: Home with Home Health Service.    Gabriella Rosa, PT  3/7/2019

## 2019-03-07 NOTE — ADDENDUM NOTE
Addendum  created 03/07/19 0920 by Quan Mcghee MD    Intraprocedure Event edited, Sign clinical note

## 2019-03-07 NOTE — ANESTHESIA POST-OP PAIN MANAGEMENT
Acute Pain Service Progress Note    03/07/2019    Called patient to discuss on-q catheter. Patient doing well, no issues or signs of LAST.  Plan is for catheter removal in the next little while.  Advised patient to call back if she had any issues/concerns.        Quan Mcghee, CA3

## 2019-03-13 ENCOUNTER — TELEPHONE (OUTPATIENT)
Dept: ORTHOPEDICS | Facility: CLINIC | Age: 64
End: 2019-03-13

## 2019-03-13 NOTE — TELEPHONE ENCOUNTER
I called to see if Mrs Herrera heard from the [facility] Home health  I had called them , had to leave a message that Mrs Herrera said that Home health hasn't come out   When we spoke      She said the Ns came Thursday  3/7 & Wed/ 3/13   PT & OT came Friday 3/8 & Sat. 3/9    Asking me why they hadn't come every day as she had been told   Told her I do not know   I gave her the # I called [phone]   Told her to call & speak with the Head Nurse or Supervisor  I do not know how they work their schedules  Maybe she can clear things up

## 2019-03-13 NOTE — TELEPHONE ENCOUNTER
----- Message from Katie Goldstein sent at 3/13/2019  2:35 PM CDT -----  Contact: Self  Pt is needing a call back regarding home health pt stated that home health has not came out   Pt can be reached @[phone]    I had to leave a message for [facility] [phone] that Mrs Herrera hasn't been seen by home health and it's been a week   I had called them on 3/6/19 it's now 3/13/19    I asked that she calll me back

## 2019-03-18 ENCOUNTER — TELEPHONE (OUTPATIENT)
Dept: ADMINISTRATIVE | Facility: CLINIC | Age: 64
End: 2019-03-18

## 2019-03-19 ENCOUNTER — OFFICE VISIT (OUTPATIENT)
Dept: ORTHOPEDICS | Facility: CLINIC | Age: 64
End: 2019-03-19
Payer: COMMERCIAL

## 2019-03-19 VITALS
BODY MASS INDEX: 39.72 KG/M2 | SYSTOLIC BLOOD PRESSURE: 135 MMHG | DIASTOLIC BLOOD PRESSURE: 83 MMHG | WEIGHT: 224.19 LBS | HEIGHT: 63 IN | HEART RATE: 91 BPM

## 2019-03-19 DIAGNOSIS — Z96.651 STATUS POST TOTAL RIGHT KNEE REPLACEMENT: Primary | ICD-10-CM

## 2019-03-19 PROCEDURE — 99999 PR PBB SHADOW E&M-EST. PATIENT-LVL III: ICD-10-PCS | Mod: PBBFAC,,, | Performed by: PHYSICIAN ASSISTANT

## 2019-03-19 PROCEDURE — 99024 PR POST-OP FOLLOW-UP VISIT: ICD-10-PCS | Mod: S$GLB,,, | Performed by: PHYSICIAN ASSISTANT

## 2019-03-19 PROCEDURE — 99999 PR PBB SHADOW E&M-EST. PATIENT-LVL III: CPT | Mod: PBBFAC,,, | Performed by: PHYSICIAN ASSISTANT

## 2019-03-19 PROCEDURE — 99024 POSTOP FOLLOW-UP VISIT: CPT | Mod: S$GLB,,, | Performed by: PHYSICIAN ASSISTANT

## 2019-03-19 RX ORDER — OXYCODONE AND ACETAMINOPHEN 10; 325 MG/1; MG/1
1 TABLET ORAL EVERY 8 HOURS PRN
Qty: 30 TABLET | Refills: 0 | Status: SHIPPED | OUTPATIENT
Start: 2019-03-19 | End: 2019-04-18 | Stop reason: SDUPTHER

## 2019-03-19 NOTE — PROGRESS NOTES
"Keyona Herrera presents for initial post-operative visit following a right total knee arthroplasty performed by Dr. [facility] on 3/4/2019. Tolerating pain medication well.      Exam:   Blood pressure 135/83, pulse 91, height 5' 3" (1.6 m), weight 101.7 kg (224 lb 3.3 oz).   Ambulating well with assistive device.  Incision is clean and dry without drainage or erythema. ROM:0-90    Initial post-operative radiographs reviewed today revealing a well fixed and aligned prosthesis.    A/P:  2 weeks s/p right total knee replacement  Dr. [facility] interviewed and examined patient today and agrees with plan.   - The patient was advised to keep the incision clean and dry for the next 24 hours after which she may wash the area with antibacterial soap in the shower. Will not submerge until the incision is completely healed.   - Outpatient PT: [facility]. Pt states that she has several weeks of HH PT remaining so she will call and I will order outpatient PT when ready.  - Continue ASA for 1 month from surgery.  - Pain medication refilled.   - Follow up in 4 weeks with [facility]. [facility]. Pt will call clinic with problems/concerns.     "

## 2019-03-25 DIAGNOSIS — Z96.651 STATUS POST TOTAL RIGHT KNEE REPLACEMENT: Primary | ICD-10-CM

## 2019-03-26 ENCOUNTER — TELEPHONE (OUTPATIENT)
Dept: ORTHOPEDICS | Facility: CLINIC | Age: 64
End: 2019-03-26

## 2019-03-26 NOTE — TELEPHONE ENCOUNTER
----- Message from Katie Goldstein sent at 3/26/2019  4:02 PM CDT -----  Contact: Selfq  Pt is needing a call back regarding questions about post op @[phone]                 We spoke  Her  has FMLA papers that need to be filled out  I informed her of the Disability & FMLA Dept. on the first floor next to the blood bank    He will bring the papers in

## 2019-03-27 ENCOUNTER — TELEPHONE (OUTPATIENT)
Dept: ORTHOPEDICS | Facility: CLINIC | Age: 64
End: 2019-03-27

## 2019-03-27 ENCOUNTER — CLINICAL SUPPORT (OUTPATIENT)
Dept: REHABILITATION | Facility: HOSPITAL | Age: 64
End: 2019-03-27
Payer: COMMERCIAL

## 2019-03-27 DIAGNOSIS — R26.9 GAIT DIFFICULTY: ICD-10-CM

## 2019-03-27 DIAGNOSIS — M25.60 RANGE OF MOTION DEFICIT: ICD-10-CM

## 2019-03-27 DIAGNOSIS — Z96.651 TOTAL KNEE REPLACEMENT STATUS, RIGHT: ICD-10-CM

## 2019-03-27 DIAGNOSIS — M62.81 MUSCLE WEAKNESS: ICD-10-CM

## 2019-03-27 PROCEDURE — 97110 THERAPEUTIC EXERCISES: CPT | Mod: PN

## 2019-03-27 PROCEDURE — 97161 PT EVAL LOW COMPLEX 20 MIN: CPT | Mod: PN

## 2019-03-27 NOTE — TELEPHONE ENCOUNTER
Called pt. She started PT today. Questions answered.    ----- Message from Jhonny Oconnell MA sent at 3/27/2019  8:13 AM CDT -----  Contact: Self      ----- Message -----  From: Katie Goldstein  Sent: 3/26/2019   4:00 PM  To: [facility] Staff    Pt is needing a call back regarding therapy.

## 2019-03-27 NOTE — PLAN OF CARE
[facility]  Physical Therapy Initial Evaluation    Name: Keyona Herrera  Clinic Number: [id number]    Therapy Diagnosis:   Encounter Diagnoses   Name Primary?    Total knee replacement status, right     Range of motion deficit     Muscle weakness     Gait difficulty      Physician: Araseli Liang MD & John Lockwood [facility], Jr., M.D.    Physician Orders: PT Eval and Treat   Medical Diagnosis from Referral: Status post total right knee replacement  Evaluation Date: 3/27/2019  Authorization Period Expiration: 3/24/2020  Plan of Care Expiration: 2019  Visit # / Visits authorized:    Return to surgeon: 2019    Time In: 9:00 am  Time Out: 10:00 am  Total Billable Time: 60 minutes    Precautions: s/p right TKA    Subjective   Date of onset: 3/4/2019  History of current condition - Keyona reports: that she had surgery on 3/4/2019. Pt ambulates with cane. Pt states she had [facility]  About 2 weeks therapy. Pt states she did exercises during therapy. Pt reports that she has about 10 or 12 stairs at home. Pt states right knee pain is about 3/10. Pt report that she is wean off from pain medication.  Pt states that her right knee pain is sharp and aching pain. Pt states she is having difficult to sleep due to pain.      Medical History:   Past Medical History:   Diagnosis Date    HTN (hypertension)     Hyperlipidemia     Obesity        Surgical History:   Keyona Herrera  has a past surgical history that includes Hysterectomy; Incisional breast biopsy; Cholecystectomy; surgery on foot; Colonoscopy (N/A, 2016);  section; and Total knee arthroplasty (Right, 3/4/2019).    Medications:   Keyona has a current medication list which includes the following prescription(s): aspirin, aspirin, coenzyme q10, dm/acetaminophen/doxylamine, docusate sodium, flaxseed oil, fluticasone, ibuprofen, ketorolac, meclizine, meloxicam, multivitamin, omega-3 fatty acids,  oxycodone-acetaminophen, and psyllium.    Allergies:   Review of patient's allergies indicates:  No Known Allergies     Imaging, bone scan films:   FINDINGS:  Recent postoperative changes of right total knee replacement.  No evidence of fracture or acute hardware abnormality.  No abnormal radiopaque retained foreign body.  Expected air and edema in the soft tissues.    Prior Therapy: [facility]  Social History: lives with their family and lives with their spouse  Occupation:    Prior Level of Function: Independent   Current Level of Function: indenpendent    Pain:  Current 3/10, worst 10/10, best 1/10   Location: right knee   Description: Aching and Sharp  Aggravating Factors: sleeping, getting in and out car, sitting to standing, standing for long period of time.   Easing Factors: pain medication, ice and elevation    Pts goals: return to daily life without right knee pain.     Objective     Observation:    Alignment: intact   Wound/ incision: intact    Posture Alignment: Normal     Sensation: intact to light touch    DTR: intact to light touch    GAIT DEVIATIONS: Keyona displays antalgic gait    Range of Motion:   Knee Left active   Flexion 112   Extension 0     Knee Right active Right Passive   Flexion 76 86   Extension -2 0       Lower Extremity Strength   Right LE  Left LE    Knee extension: 3/5 Knee extension: 4+/5   Knee flexion: 3/5 Knee flexion: 4+/5   Hip flexion: 3/5 Hip flexion: 4+/5   Hip extension:  3/5 Hip extension: 4+/5   Hip abduction: 3/5 Hip abduction: 4+/5   Hip adduction: 3/5 Hip adduction 4+/5   Ankle dorsiflexion: 4/5 Ankle dorsiflexion: 4/5   Ankle plantarflexion: 4/5 Ankle plantarflexion: 4/5       Palpation:  Anterior right knee pain       Edema:     Girth Measurement Joint line   Left 43 cm   Right 48 cm       CMS Impairment/Limitation/Restriction for FOTO Knee Survey  Status Limitation G-Code CMS Severity Modifier  Intake 30% 70% Current Status CL - At least 60 percent but less  than 80 percent  Predicted 56% 44% Goal Status+ CK - At least 40 percent but less than 60 percent  D/C Status CL **only report if this is a one time visit  +Based on FOTO predicted change score    TREATMENT   Treatment Time In: 9:30 am  Treatment Time Out: 10:00 am  Total Treatment time separate from Evaluation: 30 minutes    Keyona received therapeutic exercises to develop strength, endurance and ROM for 30 minutes including:    SLR  HL hip abd YTB   HL hip add ball squeeze   Heel slides   Heel props   Quad sets       Keyona received  Cold pack for 10 minutes to right knee.    Home Exercises and Patient Education Provided    Education provided:       Written Home Exercises Provided: Patient instructed to cont prior HEP.  Exercises were reviewed and Keyona was able to demonstrate them prior to the end of the session.  Keyona demonstrated good  understanding of the education provided.     See EMR under Patient Instructions for exercises provided 3/27/2019.    Assessment   Keyona is a 63 y.o. female referred to outpatient Physical Therapy with a medical diagnosis of Right TKA. Pt presents with s/p right TKA. Surgery was performed on 3/4/2019. Pt presented with decreased right knee ROM, muscle strength, and gait deficits. Pt presented with right knee AROM of  76 deg and right knee extension of -2 deg. Pt demonstrated deficits in gait pattern due to s/p surgery. Pt presented with antalgic gait. Pt was also instructed proper way to use cane. Pt demonstrated increase in right knee swollen. Pt was instructed to elevate and ice right LE to help decrease pain and swollen. Compression stocking was given today. Pt will benefit from skilled PT services to decrease gait deficits, improve ROM, improve muscle strength, and decrease swollen.     Pt prognosis is Excellent.   Pt will benefit from skilled outpatient Physical Therapy to address the deficits stated above and in the chart below, provide pt/family education, and  to maximize pt's level of independence.     Plan of care discussed with patient: Yes  Pt's spiritual, cultural and educational needs considered and patient is agreeable to the plan of care and goals as stated below:     Anticipated Barriers for therapy: N/a    Medical Necessity is demonstrated by the following  History  Co-morbidities and personal factors that may impact the plan of care Co-morbidities:   Not identified     Personal Factors:   no deficits     low   Examination  Body Structures and Functions, activity limitations and participation restrictions that may impact the plan of care Body Regions:   knee    Body Systems:    ROM  strength  gait  transfers    Participation Restrictions:   Community participations     Activity limitations:   Learning and applying knowledge  no deficits    General Tasks and Commands  no deficits    Communication  no deficits    Mobility  lifting and carrying objects  walking    Self care  no deficits    Domestic Life  shopping  cooking  doing house work (cleaning house, washing dishes, laundry)    Interactions/Relationships  no deficits    Life Areas  no deficits    Community and Social Life  community life         Complexity: low   Clinical Presentation stable and uncomplicated low   Decision Making/ Complexity Score: low       GOALS: Short Term Goals:  4 weeks  1.Report decreased in pain at worse less than  <   / =  5  /10  to increase tolerance for functional mobility.  2. Pt to improve right knee range of motion to 100 deg and right knee extension to 0 deg to allow for improved functional mobility to allow for improvement in IADLs.   3. Increased right LE MMT 1/2 grade to increase tolerance for ADL and work activities.  4. Pt to report ambulate without cane to improve quality of life  5. Pt to tolerate HEP to improve ROM and independence with ADL's    Long Term Goals: 8 weeks  1.Report decreased in pain at worse less than  <   / =  0  /10  to increase tolerance for  functional mobility.  2. Pt to improve right knee range of motion to greater than 115 deg and right knee extension to 0 deg to allow for improved functional mobility to allow for improvement in IADLs.  3.Increased right LE MMT 1 grade to increase tolerance for ADL and work activities.  4. Pt will report At least 40 percent but less than 60 percent on FOTO knee survey  to demonstrate increase in LE function with every day tasks.   5. Pt to be Independent with HEP to improve ROM and independence with ADL's    Plan   Plan of care Certification: 3/27/2019 to 6/27/2019.    Outpatient Physical Therapy 3 times weekly for 8 weeks to include the following interventions: Gait Training, Manual Therapy, Moist Heat/ Ice, Patient Education and Therapeutic Exercise.       Vijay Choi, PT   03/27/2019

## 2019-04-02 ENCOUNTER — CLINICAL SUPPORT (OUTPATIENT)
Dept: REHABILITATION | Facility: HOSPITAL | Age: 64
End: 2019-04-02
Payer: COMMERCIAL

## 2019-04-02 DIAGNOSIS — M25.60 RANGE OF MOTION DEFICIT: ICD-10-CM

## 2019-04-02 DIAGNOSIS — R26.9 GAIT DIFFICULTY: ICD-10-CM

## 2019-04-02 DIAGNOSIS — Z96.651 TOTAL KNEE REPLACEMENT STATUS, RIGHT: ICD-10-CM

## 2019-04-02 DIAGNOSIS — M62.81 MUSCLE WEAKNESS: ICD-10-CM

## 2019-04-02 PROCEDURE — 97140 MANUAL THERAPY 1/> REGIONS: CPT | Mod: PN

## 2019-04-02 PROCEDURE — 97110 THERAPEUTIC EXERCISES: CPT | Mod: PN

## 2019-04-02 NOTE — PROGRESS NOTES
Physical Therapy Daily Treatment Note     Name: Keyona Herrera  Clinic Number: [id number]    Therapy Diagnosis:   Encounter Diagnoses   Name Primary?    Total knee replacement status, right     Range of motion deficit     Muscle weakness     Gait difficulty      Physician: Araseli Liang MD    Visit Date: 4/2/2019    Physician Orders: PT Eval and Treat   Medical Diagnosis from Referral: Status post total right knee replacement  Evaluation Date: 3/27/2019  Authorization Period Expiration: 12/31/2019  Plan of Care Expiration: 6/27/2019  Visit # / Visits authorized: 2/ 50   PN Due:4/27/2019  Return to surgeon: 4/19/2019     DOS: 3/4/2019 POW: 4     Time In: 7:00 am  Time Out: 8:05 am  Total Billable Time: 53 minutes    Precautions: s/p right TKA      Subjective     Pt reports: she has been performing HEP. Pt states she is feeling well.   She was compliant with home exercise program.  Response to previous treatment: yes  Functional change: no    Pain: 2/10  Location: right knee        Objective     Updated 4/2/2019  Right knee flexion AAROM 105 deg  Right knee extension AROM: -3 deg    Keyona received therapeutic exercises to develop strength, endurance, ROM and flexibility for 45 minutes including:    NuStep 6 min level 10 focusing ROM  SLR 3x10  HL hip abd YTB  3x10  HL hip add ball squeeze 3x10   Heel slides  5 min with PT overpressure  Heel props  5 min 5lbs on top right knee  Quad sets 3x10  Upright bike seated level 7 focusing ROM 10 min half rev   Add SL hip abd  Add SL hip add   Add hip extension       Keyona received the following manual therapy techniques: Soft tissue Mobilization were applied to the: patella mobs for 8 minutes, including:  Patella mobs      Keyona received cold pack for 10 minutes to right knee.      Home Exercises Provided and Patient Education Provided     Education provided:       Written Home Exercises Provided: Patient instructed to cont prior HEP.  Exercises were  reviewed and Keyona was able to demonstrate them prior to the end of the session.  Keyona demonstrated good  understanding of the education provided.     See EMR under Patient Instructions for exercises provided 4/2/2019.    Assessment     Pt tolerated initial PT session well. Pt ambulated with SPC with antalgic gait pattern. Pt presented good quadriceps activation. Pt tolerated SLR and HL hip exercises well. Pt is ready to be progressed in weights (Add 2# on SLR exercise). Pt demonstrated decrease of right knee ROM (Right knee flexion AAROM 105 deg and knee extension -3 deg). Pt cont to make progress in right knee muscle strength and ROM. Plan to progress exercises next visit.     Keyona is progressing well towards her goals.   Pt prognosis is Good.     Pt will continue to benefit from skilled outpatient physical therapy to address the deficits listed in the problem list box on initial evaluation, provide pt/family education and to maximize pt's level of independence in the home and community environment.     Pt's spiritual, cultural and educational needs considered and pt agreeable to plan of care and goals.    Anticipated barriers to physical therapy: N/a    GOALS: Short Term Goals:  4 weeks  1.Report decreased in pain at worse less than  <   / =  5  /10  to increase tolerance for functional mobility.  2. Pt to improve right knee range of motion to 100 deg and right knee extension to 0 deg to allow for improved functional mobility to allow for improvement in IADLs.   3. Increased right LE MMT 1/2 grade to increase tolerance for ADL and work activities.  4. Pt to report ambulate without cane to improve quality of life  5. Pt to tolerate HEP to improve ROM and independence with ADL's     Long Term Goals: 8 weeks  1.Report decreased in pain at worse less than  <   / =  0  /10  to increase tolerance for functional mobility.  2. Pt to improve right knee range of motion to greater than 115 deg and right knee  extension to 0 deg to allow for improved functional mobility to allow for improvement in IADLs.  3.Increased right LE MMT 1 grade to increase tolerance for ADL and work activities.  4. Pt will report At least 40 percent but less than 60 percent on FOTO knee survey  to demonstrate increase in LE function with every day tasks.   5. Pt to be Independent with HEP to improve ROM and independence with ADL's      Plan     Plan of care Certification: 3/27/2019 to 6/27/2019.    Cont skilled PT session towards PT and patient's goals.    Vijay Choi, PT   04/02/2019

## 2019-04-02 NOTE — PROGRESS NOTES
Physical Therapy Daily Treatment Note     Name: Keyona Herrera  Clinic Number: [id number]    Therapy Diagnosis:   No diagnosis found.  Physician: Araseli Liang MD    Visit Date: 4/3/2019    Physician Orders: PT Eval and Treat   Medical Diagnosis from Referral: Status post total right knee replacement  Evaluation Date: 3/27/2019  Authorization Period Expiration: 12/31/2019  Plan of Care Expiration: 6/27/2019  Visit # / Visits authorized: 2/ 50   PN Due:4/27/2019  Return to surgeon: 4/19/2019     DOS: 3/4/2019 POW: 4     Time In: 9:00  am  Time Out: 10:03 am  Total Billable Time: 53 minutes    Precautions: s/p right TKA      Subjective     Pt reports: that she was sore after last PT session. Pt reports she has been compliant with HEP.   She was compliant with home exercise program.  Response to previous treatment: yes  Functional change: no    Pain: 2/10  Location: right knee        Objective     Updated 4/3/2019  Right knee flexion AAROM 113 deg  Right knee extension AROM: -3 deg    Keyona received therapeutic exercises to develop strength, endurance, ROM and flexibility for 45 minutes including:    Upright bike seated level 7 focusing ROM 10 min half rev   SLR 3x10  HL hip abd YTB  3x10  HL hip add ball squeeze 3x10   Heel slides  5 min with PT overpressure  Heel props  5 min 7 bs on top right knee  Quad sets 3x10   SL hip abd 3x10   SL hip add 3x10  hip extension  3x10  Clamshell YTB 3x10   LAQ 2# 3x10     Keyona received the following manual therapy techniques: Soft tissue Mobilization were applied to the: patella mobs for 8 minutes, including:  Patella mobs      Keyona received cold pack for 10 minutes to right knee.      Home Exercises Provided and Patient Education Provided     Education provided:       Written Home Exercises Provided: Patient instructed to cont prior HEP.  Exercises were reviewed and Keyona was able to demonstrate them prior to the end of the session.  Keyona demonstrated  good  understanding of the education provided.     See EMR under Patient Instructions for exercises provided 4/2/2019.    Assessment     Pt tolerated initial PT session well. Pt is POW 4 . Pt ambulated with SPC with antalgic gait pattern. Pt cont demonstrated increase of right quad activation. Pt tolerated progression os therapeutic exercises well today. Pt was able to tolerated SL hip abd, add, and hip extension. Pt demonstrated weakness of gluteus muscles. Pt also demonstrated decrease of hamstring muscle strength. Pt demonstrated decrease of right knee ROM (Right knee flexion AAROM 113 deg and knee extension -3 deg). Pt cont demonstrated good patella mobility.  Pt cont to make progress in right knee muscle strength and ROM. Plan to progress exercises next visit.     Keyona is progressing well towards her goals.   Pt prognosis is Good.     Pt will continue to benefit from skilled outpatient physical therapy to address the deficits listed in the problem list box on initial evaluation, provide pt/family education and to maximize pt's level of independence in the home and community environment.     Pt's spiritual, cultural and educational needs considered and pt agreeable to plan of care and goals.    Anticipated barriers to physical therapy: N/a    GOALS: Short Term Goals:  4 weeks  1.Report decreased in pain at worse less than  <   / =  5  /10  to increase tolerance for functional mobility.  2. Pt to improve right knee range of motion to 100 deg and right knee extension to 0 deg to allow for improved functional mobility to allow for improvement in IADLs.   3. Increased right LE MMT 1/2 grade to increase tolerance for ADL and work activities.  4. Pt to report ambulate without cane to improve quality of life  5. Pt to tolerate HEP to improve ROM and independence with ADL's     Long Term Goals: 8 weeks  1.Report decreased in pain at worse less than  <   / =  0  /10  to increase tolerance for functional  mobility.  2. Pt to improve right knee range of motion to greater than 115 deg and right knee extension to 0 deg to allow for improved functional mobility to allow for improvement in IADLs.  3.Increased right LE MMT 1 grade to increase tolerance for ADL and work activities.  4. Pt will report At least 40 percent but less than 60 percent on FOTO knee survey  to demonstrate increase in LE function with every day tasks.   5. Pt to be Independent with HEP to improve ROM and independence with ADL's      Plan     Plan of care Certification: 3/27/2019 to 6/27/2019.    Cont skilled PT session towards PT and patient's goals.    Vijay Choi, PT   04/02/2019

## 2019-04-03 ENCOUNTER — CLINICAL SUPPORT (OUTPATIENT)
Dept: REHABILITATION | Facility: HOSPITAL | Age: 64
End: 2019-04-03
Payer: COMMERCIAL

## 2019-04-03 DIAGNOSIS — M62.81 MUSCLE WEAKNESS: ICD-10-CM

## 2019-04-03 DIAGNOSIS — M25.60 RANGE OF MOTION DEFICIT: ICD-10-CM

## 2019-04-03 DIAGNOSIS — R26.9 GAIT DIFFICULTY: ICD-10-CM

## 2019-04-03 DIAGNOSIS — Z96.651 TOTAL KNEE REPLACEMENT STATUS, RIGHT: ICD-10-CM

## 2019-04-03 PROCEDURE — 97110 THERAPEUTIC EXERCISES: CPT | Mod: PN

## 2019-04-03 PROCEDURE — 97140 MANUAL THERAPY 1/> REGIONS: CPT | Mod: PN

## 2019-04-05 ENCOUNTER — CLINICAL SUPPORT (OUTPATIENT)
Dept: REHABILITATION | Facility: HOSPITAL | Age: 64
End: 2019-04-05
Payer: COMMERCIAL

## 2019-04-05 DIAGNOSIS — M62.81 MUSCLE WEAKNESS: ICD-10-CM

## 2019-04-05 DIAGNOSIS — R26.9 GAIT DIFFICULTY: ICD-10-CM

## 2019-04-05 DIAGNOSIS — M25.60 RANGE OF MOTION DEFICIT: ICD-10-CM

## 2019-04-05 DIAGNOSIS — Z96.651 TOTAL KNEE REPLACEMENT STATUS, RIGHT: ICD-10-CM

## 2019-04-05 PROCEDURE — 97140 MANUAL THERAPY 1/> REGIONS: CPT | Mod: PN

## 2019-04-05 PROCEDURE — 97110 THERAPEUTIC EXERCISES: CPT | Mod: PN

## 2019-04-05 NOTE — PROGRESS NOTES
Physical Therapy Daily Treatment Note     Name: Keyona Herrera  Clinic Number: [id number]    Therapy Diagnosis:   Encounter Diagnoses   Name Primary?    Total knee replacement status, right     Range of motion deficit     Muscle weakness     Gait difficulty      Physician: Araseli Liang MD    Visit Date: 4/5/2019    Physician Orders: PT Eval and Treat   Medical Diagnosis from Referral: Status post total right knee replacement  Evaluation Date: 3/27/2019  Authorization Period Expiration: 12/31/2019  Plan of Care Expiration: 6/27/2019  Visit # / Visits authorized: 3/ 50   PN Due:4/27/2019  Return to surgeon: 4/19/2019     DOS: 3/4/2019 POW: 4     Time In: 1525  Time Out: 1625  Total Billable Time: 40  minutes    Precautions: s/p right TKA      Subjective     Pt reports: that she is feeling so- so today.  Pt states that she having some stiffness in her knee today.   She was compliant with home exercise program.  Response to previous treatment: yes  Functional change: no    Pain: 3/10  Location: right knee        Objective     Updated 4/3/2019  Right knee flexion AAROM 115 deg/ PROM 120   Right knee extension AROM: -3 deg    Keyona received therapeutic exercises to develop strength, endurance, ROM and flexibility for 40 minutes including:    Upright bike seated level 7 focusing ROM 10 min full revolutions  + standing calf stretch 3 x 20''   + standing heel raises x 20   + standing hip abduction x 10 ea     Quad sets 2 x10 x 3''   SLR 2 x10   SL hip abd 2 x10   SL hip add 2 x10   hip extension  2 x 10  + prone HSC 2 x 10   Heel slides  5 min with PT overpressure  Heel props  5 min 7 bs on top right knee  Clamshell YTB 2 x10   LAQ 2# 3x10       Keyona received the following manual therapy techniques: Soft tissue Mobilization were applied to the: patella mobs for 10 minutes, including:  Patella mobs  Scar massage     Keyona received cold pack for 10 minutes to right knee.      Home Exercises Provided  and Patient Education Provided     Education provided:   Proper technique to ensure quad activation    Written Home Exercises Provided: Patient instructed to cont prior HEP.  Exercises were reviewed and Keyona was able to demonstrate them prior to the end of the session.  Keyona demonstrated good  understanding of the education provided.     See EMR under Patient Instructions for exercises provided 4/2/2019.    Assessment     Patient tolerated treatment session well today.  Patient with good response to progression of exercises with appropriate training effect achieved.  Patient was able to achieve full revolutions on the upright bike today with no pain.  Pt also displayed an increase in active and passive range of motion.  Pt required minimal tactile and verbal cues to achieve proper muscle recruitment with quad sets.     Keyona is progressing well towards her goals.   Pt prognosis is Good.     Pt will continue to benefit from skilled outpatient physical therapy to address the deficits listed in the problem list box on initial evaluation, provide pt/family education and to maximize pt's level of independence in the home and community environment.     Pt's spiritual, cultural and educational needs considered and pt agreeable to plan of care and goals.    Anticipated barriers to physical therapy: N/a    GOALS: Short Term Goals:  4 weeks  1.Report decreased in pain at worse less than  <   / =  5  /10  to increase tolerance for functional mobility.  2. Pt to improve right knee range of motion to 100 deg and right knee extension to 0 deg to allow for improved functional mobility to allow for improvement in IADLs.   3. Increased right LE MMT 1/2 grade to increase tolerance for ADL and work activities.  4. Pt to report ambulate without cane to improve quality of life  5. Pt to tolerate HEP to improve ROM and independence with ADL's     Long Term Goals: 8 weeks  1.Report decreased in pain at worse less than  <   / =   0  /10  to increase tolerance for functional mobility.  2. Pt to improve right knee range of motion to greater than 115 deg and right knee extension to 0 deg to allow for improved functional mobility to allow for improvement in IADLs.  3.Increased right LE MMT 1 grade to increase tolerance for ADL and work activities.  4. Pt will report At least 40 percent but less than 60 percent on FOTO knee survey  to demonstrate increase in LE function with every day tasks.   5. Pt to be Independent with HEP to improve ROM and independence with ADL's      Plan     Plan of care Certification: 3/27/2019 to 6/27/2019.    Cont skilled PT session towards PT and patient's goals.    Lora Parish, PTA   04/05/2019

## 2019-04-09 ENCOUNTER — CLINICAL SUPPORT (OUTPATIENT)
Dept: REHABILITATION | Facility: HOSPITAL | Age: 64
End: 2019-04-09
Payer: COMMERCIAL

## 2019-04-09 DIAGNOSIS — M62.81 MUSCLE WEAKNESS: ICD-10-CM

## 2019-04-09 DIAGNOSIS — M25.60 RANGE OF MOTION DEFICIT: ICD-10-CM

## 2019-04-09 DIAGNOSIS — Z96.651 TOTAL KNEE REPLACEMENT STATUS, RIGHT: ICD-10-CM

## 2019-04-09 DIAGNOSIS — R26.9 GAIT DIFFICULTY: ICD-10-CM

## 2019-04-09 PROCEDURE — 97110 THERAPEUTIC EXERCISES: CPT | Mod: PN

## 2019-04-09 NOTE — PROGRESS NOTES
Physical Therapy Daily Treatment Note     Name: Keyona Herrera  Clinic Number: [id number]    Therapy Diagnosis:   Encounter Diagnoses   Name Primary?    Total knee replacement status, right     Range of motion deficit     Muscle weakness     Gait difficulty      Physician: Araseli Liang MD    Visit Date: 4/10/2019    Physician Orders: PT Eval and Treat   Medical Diagnosis from Referral: Status post total right knee replacement  Evaluation Date: 3/27/2019  Authorization Period Expiration: 12/31/2019  Plan of Care Expiration: 6/27/2019  Visit # / Visits authorized: 3/ 50   PN Due:4/27/2019  Return to surgeon: 4/18/2019     DOS: 3/4/2019 POW: 5     Time In: 8:00 am  Time Out: 9:00 am  Total Billable Time: 53  minutes    Precautions: s/p right TKA      Subjective     Pt reports: that she is doing okay today.  Pt states she does not have a lot right knee pain.  Pt states she still taking pain medication. Pt will return to physician on 4-.  She was compliant with home exercise program.  Response to previous treatment: quad muscle soreness  Functional change: Ascending and descending stairs, ambulating far in the community     Pain: 2/10  Location: right knee        Objective     Updated 4/9/2019   Right knee flexion AROM 112deg/ PROM 120   & Left knee flexion AROM: 113 deg  Right knee extension AROM: 0 deg    Keyona received therapeutic exercises to develop strength, endurance, ROM and flexibility for 55 minutes including:    Upright bike seated level 6 focusing ROM 10 min full revolutions  standing calf stretch 3 x 20''   standing heel raises 2x 15   Standing hip extension 2x15 YTB   standing hip abduction 2 x 15 ea YTB   Step ups and down 3x10  Lateral step ups and down 3x10  Wall mini squats 3x10  TKE   Sit to stand from 18 in plyobox 2x10  Shuttle MVP 1 black cord 2x15  Heel slides  5 min    NP  SLR 2 x10   SL hip abd 2 x10   SL hip add 2 x10   Quad sets 2 x10 x 3''  Heel props  5 min 7 bs on  top right knee - np   Clamshell YTB 2 x10    prone HSC 3 x 10   + Prone hangs x 3' 2#       Keyona received the following manual therapy techniques: Soft tissue Mobilization were applied to the: patella mobs for 00 minutes, including:  Patella mobs  Scar massage     Keyona received cold pack for 10 minutes to right knee.      Home Exercises Provided and Patient Education Provided     Education provided:   Proper technique to ensure quad activation    Written Home Exercises Provided: Patient instructed to cont prior HEP.  Exercises were reviewed and Keyona was able to demonstrate them prior to the end of the session.  Keyona demonstrated good  understanding of the education provided.     See EMR under Patient Instructions for exercises provided 4/2/2019.    Assessment     Patient tolerated treatment session well today. Pt tolerated progression to standing exercises well today. Pt was able to improve quad control. Pt cont with quad fasciculation and weakness. Pt demonstrated weakness of right hip during exercises. Standing exercises performed to simulate functional mobilities. Pt demonstrated right knee AROM of 112 deg and PROM 120 deg. PT has left knee AROM of 113 deg. Pt has been progressing well in therapy. Pt will return to M.D on 4-. Cont skilled PT services to decrease functional limitations.     Keyona is progressing well towards her goals.   Pt prognosis is Good.     Pt will continue to benefit from skilled outpatient physical therapy to address the deficits listed in the problem list box on initial evaluation, provide pt/family education and to maximize pt's level of independence in the home and community environment.     Pt's spiritual, cultural and educational needs considered and pt agreeable to plan of care and goals.    Anticipated barriers to physical therapy: N/a    GOALS: Short Term Goals:  4 weeks  1.Report decreased in pain at worse less than  <   / =  5  /10  to increase tolerance  for functional mobility. Goal in progress  2. Pt to improve right knee range of motion to 100 deg and right knee extension to 0 deg to allow for improved functional mobility to allow for improvement in IADLs. . Goal in progress  3. Increased right LE MMT 1/2 grade to increase tolerance for ADL and work activities.. Goal in progress  4. Pt to report ambulate without cane to improve quality of life . Goal in progress  5. Pt to tolerate HEP to improve ROM and independence with ADL's . Goal in progress     Long Term Goals: 8 weeks  1.Report decreased in pain at worse less than  <   / =  0  /10  to increase tolerance for functional mobility.. Goal in progress  2. Pt to improve right knee range of motion to greater than 115 deg and right knee extension to 0 deg to allow for improved functional mobility to allow for improvement in IADLs. . Goal in progress  3.Increased right LE MMT 1 grade to increase tolerance for ADL and work activities. . Goal in progress  4. Pt will report At least 40 percent but less than 60 percent on FOTO knee survey  to demonstrate increase in LE function with every day tasks. . Goal in progress  5. Pt to be Independent with HEP to improve ROM and independence with ADL's . Goal in progress      Plan     Plan of care Certification: 3/27/2019 to 6/27/2019.    Cont skilled PT session towards PT and patient's goals.    Vijay Choi, PT   04/10/2019

## 2019-04-09 NOTE — PROGRESS NOTES
Physical Therapy Daily Treatment Note     Name: Keyona Herrera  Clinic Number: [id number]    Therapy Diagnosis:   Encounter Diagnoses   Name Primary?    Total knee replacement status, right     Range of motion deficit     Muscle weakness     Gait difficulty      Physician: Araseli Liang MD    Visit Date: 4/9/2019    Physician Orders: PT Eval and Treat   Medical Diagnosis from Referral: Status post total right knee replacement  Evaluation Date: 3/27/2019  Authorization Period Expiration: 12/31/2019  Plan of Care Expiration: 6/27/2019  Visit # / Visits authorized: 3/ 50   PN Due:4/27/2019  Return to surgeon: 4/18/2019     DOS: 3/4/2019 POW: 5     Time In: 0900  Time Out: 1000  Total Billable Time: 30  minutes    Precautions: s/p right TKA      Subjective     Pt reports: that she is doing okay today.  Patient states that she is having some increased pain in her knee likely due to the weather.   She was compliant with home exercise program.  Response to previous treatment: yes  Functional change: no    Pain: 3/10  Location: right knee        Objective     Updated 4/9/2019  Right knee flexion AAROM 118deg/ PROM 123  Right knee extension AROM: 0 deg    Keyona received therapeutic exercises to develop strength, endurance, ROM and flexibility for 40 minutes including:    Upright bike seated level 7 focusing ROM 10 min full revolutions  standing calf stretch 3 x 20''   standing heel raises x 20   standing hip abduction 2 x 10 ea     Quad sets 2 x10 x 3''   SLR 2 x10   SL hip abd 2 x10   SL hip add 2 x10   hip extension  2 x 10   prone HSC 3 x 10   + Prone hangs x 3' 2#   Heel slides  5 min with PTA overpressure  Heel props  5 min 7 bs on top right knee - np   Clamshell YTB 2 x10   LAQ 2# 3x10       Keyona received the following manual therapy techniques: Soft tissue Mobilization were applied to the: patella mobs for 10 minutes, including:  Patella mobs  Scar massage     Keyona received cold pack for 10  minutes to right knee.      Home Exercises Provided and Patient Education Provided     Education provided:   Proper technique to ensure quad activation    Written Home Exercises Provided: Patient instructed to cont prior HEP.  Exercises were reviewed and Keyona was able to demonstrate them prior to the end of the session.  Keyona demonstrated good  understanding of the education provided.     See EMR under Patient Instructions for exercises provided 4/2/2019.    Assessment     Patient tolerated treatment session well.  Patient with good tolerance to exercises with appropriate training effect achieved.  Patient with good response to progression of exercises and was able to increase active and passive range of motions.  Pt continues to require cues to decrease glute and hamstring compensation with performance of quad sets.     Keyona is progressing well towards her goals.   Pt prognosis is Good.     Pt will continue to benefit from skilled outpatient physical therapy to address the deficits listed in the problem list box on initial evaluation, provide pt/family education and to maximize pt's level of independence in the home and community environment.     Pt's spiritual, cultural and educational needs considered and pt agreeable to plan of care and goals.    Anticipated barriers to physical therapy: N/a    GOALS: Short Term Goals:  4 weeks  1.Report decreased in pain at worse less than  <   / =  5  /10  to increase tolerance for functional mobility.  2. Pt to improve right knee range of motion to 100 deg and right knee extension to 0 deg to allow for improved functional mobility to allow for improvement in IADLs.   3. Increased right LE MMT 1/2 grade to increase tolerance for ADL and work activities.  4. Pt to report ambulate without cane to improve quality of life  5. Pt to tolerate HEP to improve ROM and independence with ADL's     Long Term Goals: 8 weeks  1.Report decreased in pain at worse less than  <   /  =  0  /10  to increase tolerance for functional mobility.  2. Pt to improve right knee range of motion to greater than 115 deg and right knee extension to 0 deg to allow for improved functional mobility to allow for improvement in IADLs.  3.Increased right LE MMT 1 grade to increase tolerance for ADL and work activities.  4. Pt will report At least 40 percent but less than 60 percent on FOTO knee survey  to demonstrate increase in LE function with every day tasks.   5. Pt to be Independent with HEP to improve ROM and independence with ADL's      Plan     Progress with quad strengthening as tolerated.     Lora Parish, PTA   04/09/2019

## 2019-04-10 ENCOUNTER — CLINICAL SUPPORT (OUTPATIENT)
Dept: REHABILITATION | Facility: HOSPITAL | Age: 64
End: 2019-04-10
Payer: COMMERCIAL

## 2019-04-10 DIAGNOSIS — R26.9 GAIT DIFFICULTY: ICD-10-CM

## 2019-04-10 DIAGNOSIS — Z96.651 TOTAL KNEE REPLACEMENT STATUS, RIGHT: ICD-10-CM

## 2019-04-10 DIAGNOSIS — M62.81 MUSCLE WEAKNESS: ICD-10-CM

## 2019-04-10 DIAGNOSIS — M25.60 RANGE OF MOTION DEFICIT: ICD-10-CM

## 2019-04-10 PROCEDURE — 97110 THERAPEUTIC EXERCISES: CPT | Mod: PN

## 2019-04-12 ENCOUNTER — CLINICAL SUPPORT (OUTPATIENT)
Dept: REHABILITATION | Facility: HOSPITAL | Age: 64
End: 2019-04-12
Payer: COMMERCIAL

## 2019-04-12 DIAGNOSIS — R26.9 GAIT DIFFICULTY: ICD-10-CM

## 2019-04-12 DIAGNOSIS — M62.81 MUSCLE WEAKNESS: ICD-10-CM

## 2019-04-12 DIAGNOSIS — Z96.651 TOTAL KNEE REPLACEMENT STATUS, RIGHT: ICD-10-CM

## 2019-04-12 DIAGNOSIS — M25.60 RANGE OF MOTION DEFICIT: ICD-10-CM

## 2019-04-12 PROCEDURE — 97110 THERAPEUTIC EXERCISES: CPT | Mod: PN

## 2019-04-12 NOTE — PROGRESS NOTES
Physical Therapy Daily Treatment Note     Name: Keyona Herrera  Clinic Number: [id number]    Therapy Diagnosis:   Encounter Diagnoses   Name Primary?    Total knee replacement status, right     Range of motion deficit     Muscle weakness     Gait difficulty      Physician: Araseli Liang MD    Visit Date: 4/12/2019    Physician Orders: PT Eval and Treat   Medical Diagnosis from Referral: Status post total right knee replacement  Evaluation Date: 3/27/2019  Authorization Period Expiration: 12/31/2019  Plan of Care Expiration: 6/27/2019  Visit # / Visits authorized: 7/ 50   PN Due:4/27/2019  Return to surgeon: 4/18/2019     DOS: 3/4/2019 POW: 5     Time In: 0855  Time Out: 0955  Total Billable Time: 30  minutes    Precautions: s/p right TKA      Subjective     Pt reports:that she still has some stiffness in her knee first thing in the morning.   She was compliant with home exercise program.  Response to previous treatment: quad muscle soreness  Functional change:    Pain: 2/10  Location: right knee        Objective     Updated 4/12/2019   Right knee flexion AROM 118deg/ PROM 123   Right knee extension AROM: 0 deg    Keyona received therapeutic exercises to develop strength, endurance, ROM and flexibility for 45 minutes including:    Upright bike seated level 6 focusing ROM 8  min full revolutions  standing calf stretch 3 x 30''   standing heel raises 2x 15   Standing hip extension 2x15 YTB   standing hip abduction 2 x 15 ea YTB   Step ups and down 3x10  Lateral step ups and down 3x10  + Bridges 2 x 10   SLR 2 x10  Wall mini squats 3x10  TKE   Sit to stand from 18 in plyobox 2x10  Shuttle MVP 1 black cord 2x15  Heel slides  5 min    NP     SL hip abd 2 x10   SL hip add 2 x10   Quad sets 2 x10 x 3''  Heel props  5 min 7 bs on top right knee - np   Clamshell YTB 2 x10    prone HSC 3 x 10   Prone hangs x 3' 2#       Keyona received the following manual therapy techniques: Soft tissue Mobilization were  applied to the: patella mobs for 10 minutes, including:  Patella mobs  Scar massage     Keyona received cold pack for 10 minutes to right knee.      Home Exercises Provided and Patient Education Provided     Education provided:   Proper technique to ensure quad activation    Written Home Exercises Provided: Patient instructed to cont prior HEP.  Exercises were reviewed and Keyona was able to demonstrate them prior to the end of the session.  Keyona demonstrated good  understanding of the education provided.     See EMR under Patient Instructions for exercises provided 4/2/2019.    Assessment     Patient tolerated treatment session well.  Patient with good tolerance to exercises with appropriate training effect achieved.  Pt continues to display improvements in active and passive range of motion.  Patient is steadily progressing towards her goals.   Pt prognosis is Good.     Pt will continue to benefit from skilled outpatient physical therapy to address the deficits listed in the problem list box on initial evaluation, provide pt/family education and to maximize pt's level of independence in the home and community environment.     Pt's spiritual, cultural and educational needs considered and pt agreeable to plan of care and goals.    Anticipated barriers to physical therapy: N/a    GOALS: Short Term Goals:  4 weeks  1.Report decreased in pain at worse less than  <   / =  5  /10  to increase tolerance for functional mobility. Goal in progress  2. Pt to improve right knee range of motion to 100 deg and right knee extension to 0 deg to allow for improved functional mobility to allow for improvement in IADLs. . Goal in progress  3. Increased right LE MMT 1/2 grade to increase tolerance for ADL and work activities.. Goal in progress  4. Pt to report ambulate without cane to improve quality of life . Goal in progress  5. Pt to tolerate HEP to improve ROM and independence with ADL's . Goal in progress     Long Term  Goals: 8 weeks  1.Report decreased in pain at worse less than  <   / =  0  /10  to increase tolerance for functional mobility.. Goal in progress  2. Pt to improve right knee range of motion to greater than 115 deg and right knee extension to 0 deg to allow for improved functional mobility to allow for improvement in IADLs. . Goal in progress  3.Increased right LE MMT 1 grade to increase tolerance for ADL and work activities. . Goal in progress  4. Pt will report At least 40 percent but less than 60 percent on FOTO knee survey  to demonstrate increase in LE function with every day tasks. . Goal in progress  5. Pt to be Independent with HEP to improve ROM and independence with ADL's . Goal in progress      Plan     Plan of care Certification: 3/27/2019 to 6/27/2019.    Cont skilled PT session towards PT and patient's goals.    Lora Parish, PTA   04/12/2019

## 2019-04-12 NOTE — PROGRESS NOTES
Physical Therapy Daily Treatment Note     Name: Keyona Herrera  Clinic Number: [id number]    Therapy Diagnosis:   Encounter Diagnoses   Name Primary?    Total knee replacement status, right     Range of motion deficit     Muscle weakness     Gait difficulty      Physician: Araseli Liang MD    Visit Date: 4/15/2019    Physician Orders: PT Eval and Treat   Medical Diagnosis from Referral: Status post total right knee replacement  Evaluation Date: 3/27/2019  Authorization Period Expiration: 12/31/2019  Plan of Care Expiration: 6/27/2019  Visit # / Visits authorized: 8/ 50   PN Due:4/27/2019  Return to surgeon: 4/18/2019     DOS: 3/4/2019 POW: 6     Time In: 9:00 am  Time Out: 10:00 am  Total Billable Time: 55  minutes    Precautions: s/p right TKA      Subjective     Pt reports:that she still has some stiffness in her knee first thing in the morning.   She was compliant with home exercise program.  Response to previous treatment: right leg soreness   Functional change: walking fast pace.     Pain: 2/10  Location: right knee        Objective     Updated 4/15/2019   Right knee flexion AROM 120 deg/ PROM 123   Right knee extension AROM: 0 deg    Keyona received therapeutic exercises to develop strength, endurance, ROM and flexibility for 55 minutes including:    Upright bike seated level 2 focusing ROM 8  min full revolutions  standing calf stretch 3 x 30''   standing heel raises 2x 15   Standing hip extension 2x15 YTB   standing hip abduction 2 x 15 ea YTB   Step ups and down 3x10  Lateral step ups and down 3x10  + Bridges 2 x 10   SLR 2 x10  Wall mini squats 3x10    TKE 2x10  Sit to stand from 18 in plyobox 2x10  Shuttle MVP 1 black cord 2x15  Heel slides  5 min  +Machine knee extension 10# 2x10 add next visit  +Machine knee flexion 20# 2x10 add  Next visit   +Machine hip abd 10# 2x10 add next visit  +Machine hip add 10# 2x10 add next visit    NP     SL hip abd 2 x10   SL hip add 2 x10   Quad sets 2 x10  x 3''  Heel props  5 min 7 bs on top right knee - np   Clamshell YTB 2 x10    prone HSC 3 x 10   Prone hangs x 3' 2#       Keyona received the following manual therapy techniques: Soft tissue Mobilization were applied to the: patella mobs for 00 minutes, including:  Patella mobs  Scar massage     Keyona received cold pack for 10 minutes to right knee.      Home Exercises Provided and Patient Education Provided     Education provided:   Proper technique to ensure quad activation    Written Home Exercises Provided: Patient instructed to cont prior HEP.  Exercises were reviewed and Keyona was able to demonstrate them prior to the end of the session.  Keyona demonstrated good  understanding of the education provided.     See EMR under Patient Instructions for exercises provided 4/2/2019.    Assessment     Patient tolerated treatment session well.  Pt increased right knee flexion AROM to 120 deg today. Pt has 0 deg of right knee extension. Pt cont to improve right quadriceps and hamstring muscle strength. Pt is ready to be progressed next session with machine exercises. Patient is steadily progressing towards her goals.   Pt prognosis is Good.     Pt will continue to benefit from skilled outpatient physical therapy to address the deficits listed in the problem list box on initial evaluation, provide pt/family education and to maximize pt's level of independence in the home and community environment.     Pt's spiritual, cultural and educational needs considered and pt agreeable to plan of care and goals.    Anticipated barriers to physical therapy: N/a    GOALS: Short Term Goals:  4 weeks  1.Report decreased in pain at worse less than  <   / =  5  /10  to increase tolerance for functional mobility. Goal in progress  2. Pt to improve right knee range of motion to 100 deg and right knee extension to 0 deg to allow for improved functional mobility to allow for improvement in IADLs. . Goal in progress  3. Increased  right LE MMT 1/2 grade to increase tolerance for ADL and work activities.. Goal in progress  4. Pt to report ambulate without cane to improve quality of life . Goal in progress  5. Pt to tolerate HEP to improve ROM and independence with ADL's . Goal in progress     Long Term Goals: 8 weeks  1.Report decreased in pain at worse less than  <   / =  0  /10  to increase tolerance for functional mobility.. Goal in progress  2. Pt to improve right knee range of motion to greater than 115 deg and right knee extension to 0 deg to allow for improved functional mobility to allow for improvement in IADLs. . Goal in progress  3.Increased right LE MMT 1 grade to increase tolerance for ADL and work activities. . Goal in progress  4. Pt will report At least 40 percent but less than 60 percent on FOTO knee survey  to demonstrate increase in LE function with every day tasks. . Goal in progress  5. Pt to be Independent with HEP to improve ROM and independence with ADL's . Goal in progress      Plan     Plan of care Certification: 3/27/2019 to 6/27/2019.    Cont skilled PT session towards PT and patient's goals.    Vijay Choi, PT   04/15/2019

## 2019-04-15 ENCOUNTER — CLINICAL SUPPORT (OUTPATIENT)
Dept: REHABILITATION | Facility: HOSPITAL | Age: 64
End: 2019-04-15
Payer: COMMERCIAL

## 2019-04-15 DIAGNOSIS — M25.60 RANGE OF MOTION DEFICIT: ICD-10-CM

## 2019-04-15 DIAGNOSIS — R26.9 GAIT DIFFICULTY: ICD-10-CM

## 2019-04-15 DIAGNOSIS — M62.81 MUSCLE WEAKNESS: ICD-10-CM

## 2019-04-15 DIAGNOSIS — Z96.651 TOTAL KNEE REPLACEMENT STATUS, RIGHT: ICD-10-CM

## 2019-04-15 PROCEDURE — 97110 THERAPEUTIC EXERCISES: CPT | Mod: PN

## 2019-04-16 NOTE — PROGRESS NOTES
Physical Therapy Daily Treatment Note     Name: Keyona Broussardna  Clinic Number: [id number]    Therapy Diagnosis:   Encounter Diagnoses   Name Primary?    Total knee replacement status, right     Range of motion deficit     Muscle weakness     Gait difficulty      Physician: Araseli Liang MD    Visit Date: 4/17/2019    Physician Orders: PT Eval and Treat   Medical Diagnosis from Referral: Status post total right knee replacement  Evaluation Date: 3/27/2019  Authorization Period Expiration: 12/31/2019  Plan of Care Expiration: 6/27/2019  Visit # / Visits authorized: 8/ 50   PN Due:4/27/2019  Return to surgeon: 4/18/2019     DOS: 3/4/2019 POW: 6     Time In: 9:00 am  Time Out: 10:03 am  Total Billable Time: 53  minutes    Precautions: s/p right TKA      Subjective     Pt reports:that she still have some night knee stiffness. Pt reports she has minimal right knee pain. She states that she sometimes takes NSAIDs in the morning.   She was compliant with home exercise program.  Response to previous treatment: right leg soreness   Functional change: walking further distance     Pain: 2/10  Location: right knee        Objective     Updated 4/17/2019   Right knee flexion AROM 120 deg/ PROM 123   Right knee extension AROM: 0 deg    Keyona received therapeutic exercises to develop strength, endurance, ROM and flexibility for 55 minutes including:    Upright bike seated level 1 focusing ROM 8  min full revolutions  standing calf stretch 3 x 30''   standing heel raises 2x 15   Standing hip extension 2x15 YTB   standing hip abduction 2 x 15 ea YTB   Step ups and down 6 in box 3x10  Lateral step ups and down 6 in box  3x10  Wall mini squats 3x10    TKE 2x10  Sit to stand from 18 in plyobox 3x10  Shuttle MVP 1 black cord 2x15  Heel slides  5 min  +Machine knee extension 10# 3x10  +Machine knee flexion 20# 3x10  +Machine hip abd 10# 2x10   +Machine hip add 10# 2x10   +Shuttle map DL squat 2 black cord 3x10     NP      SL hip abd 2 x10   SL hip add 2 x10   Quad sets 2 x10 x 3''  Heel props  5 min 7 bs on top right knee - np   Clamshell YTB 2 x10    prone HSC 3 x 10   Prone hangs x 3' 2#   SLR 2 x10  + Bridges 2 x 10       Keyona received the following manual therapy techniques: Soft tissue Mobilization were applied to the: patella mobs for 00 minutes, including:  Patella mobs  Scar massage     Keyona received cold pack for 10 minutes to right knee.      Home Exercises Provided and Patient Education Provided     Education provided:   Proper technique to ensure quad activation    Written Home Exercises Provided: Patient instructed to cont prior HEP.  Exercises were reviewed and Keyona was able to demonstrate them prior to the end of the session.  Keyona demonstrated good  understanding of the education provided.     See EMR under Patient Instructions for exercises provided 4/2/2019.    Assessment     Patient tolerated treatment session well.  Pt cont to improve right quadriceps muscle strength. Right quad muscle fasciculation was noticed during leg extension exercises. Pt tolerated progression of therapeutic exercises well. Pt cont wiht right knee flexion AROM to 120 deg today. Pt has 0 deg of right knee extension. Pt will return to M.D tomorrow for follow up.  Patient is steadily progressing towards her goals.   Pt prognosis is Good.      Pt will continue to benefit from skilled outpatient physical therapy to address the deficits listed in the problem list box on initial evaluation, provide pt/family education and to maximize pt's level of independence in the home and community environment.     Pt's spiritual, cultural and educational needs considered and pt agreeable to plan of care and goals.    Anticipated barriers to physical therapy: N/a    GOALS: Short Term Goals:  4 weeks  1.Report decreased in pain at worse less than  <   / =  5  /10  to increase tolerance for functional mobility. Goal in progress  2. Pt to improve  right knee range of motion to 100 deg and right knee extension to 0 deg to allow for improved functional mobility to allow for improvement in IADLs. . Goal in progress  3. Increased right LE MMT 1/2 grade to increase tolerance for ADL and work activities.. Goal in progress  4. Pt to report ambulate without cane to improve quality of life . Goal in progress  5. Pt to tolerate HEP to improve ROM and independence with ADL's . Goal in progress     Long Term Goals: 8 weeks  1.Report decreased in pain at worse less than  <   / =  0  /10  to increase tolerance for functional mobility.. Goal in progress  2. Pt to improve right knee range of motion to greater than 115 deg and right knee extension to 0 deg to allow for improved functional mobility to allow for improvement in IADLs. . Goal in progress  3.Increased right LE MMT 1 grade to increase tolerance for ADL and work activities. . Goal in progress  4. Pt will report At least 40 percent but less than 60 percent on FOTO knee survey  to demonstrate increase in LE function with every day tasks. . Goal in progress  5. Pt to be Independent with HEP to improve ROM and independence with ADL's . Goal in progress      Plan     Plan of care Certification: 3/27/2019 to 6/27/2019.    Cont skilled PT session towards PT and patient's goals.    Vijay Choi, PT   04/17/2019

## 2019-04-17 ENCOUNTER — CLINICAL SUPPORT (OUTPATIENT)
Dept: REHABILITATION | Facility: HOSPITAL | Age: 64
End: 2019-04-17
Payer: COMMERCIAL

## 2019-04-17 DIAGNOSIS — M25.60 RANGE OF MOTION DEFICIT: ICD-10-CM

## 2019-04-17 DIAGNOSIS — R26.9 GAIT DIFFICULTY: ICD-10-CM

## 2019-04-17 DIAGNOSIS — Z96.651 TOTAL KNEE REPLACEMENT STATUS, RIGHT: ICD-10-CM

## 2019-04-17 DIAGNOSIS — M62.81 MUSCLE WEAKNESS: ICD-10-CM

## 2019-04-17 PROCEDURE — 97110 THERAPEUTIC EXERCISES: CPT | Mod: PN

## 2019-04-18 ENCOUNTER — OFFICE VISIT (OUTPATIENT)
Dept: ORTHOPEDICS | Facility: CLINIC | Age: 64
End: 2019-04-18
Payer: COMMERCIAL

## 2019-04-18 VITALS — BODY MASS INDEX: 38.52 KG/M2 | WEIGHT: 217.38 LBS | HEIGHT: 63 IN

## 2019-04-18 DIAGNOSIS — Z96.651 STATUS POST TOTAL RIGHT KNEE REPLACEMENT: Primary | ICD-10-CM

## 2019-04-18 DIAGNOSIS — Z96.651 STATUS POST TOTAL RIGHT KNEE REPLACEMENT: ICD-10-CM

## 2019-04-18 PROCEDURE — 99999 PR PBB SHADOW E&M-EST. PATIENT-LVL II: CPT | Mod: PBBFAC,,, | Performed by: ORTHOPAEDIC SURGERY

## 2019-04-18 PROCEDURE — 99024 PR POST-OP FOLLOW-UP VISIT: ICD-10-PCS | Mod: S$GLB,,, | Performed by: ORTHOPAEDIC SURGERY

## 2019-04-18 PROCEDURE — 99024 POSTOP FOLLOW-UP VISIT: CPT | Mod: S$GLB,,, | Performed by: ORTHOPAEDIC SURGERY

## 2019-04-18 PROCEDURE — 99999 PR PBB SHADOW E&M-EST. PATIENT-LVL II: ICD-10-PCS | Mod: PBBFAC,,, | Performed by: ORTHOPAEDIC SURGERY

## 2019-04-18 RX ORDER — OXYCODONE AND ACETAMINOPHEN 10; 325 MG/1; MG/1
1 TABLET ORAL EVERY 8 HOURS PRN
Qty: 30 TABLET | Refills: 0 | Status: SHIPPED | OUTPATIENT
Start: 2019-04-18 | End: 2022-06-03

## 2019-04-22 ENCOUNTER — DOCUMENTATION ONLY (OUTPATIENT)
Dept: REHABILITATION | Facility: HOSPITAL | Age: 64
End: 2019-04-22

## 2019-04-22 NOTE — PROGRESS NOTES
Physical Therapy: No show/Cancellation of Visit  Date: 04/22/2019      Patient cancelled today's PT appointment. Reason for cancellation: none given,  Pt cancelled via SMS system. Patient's next scheduled appointment is 4/24.     Cancel: 2  No show: 0    Therapist: Lora Parihs PTA

## 2019-04-23 NOTE — PROGRESS NOTES
Physical Therapy Daily Treatment Note     Name: Keyona Herrera  Clinic Number: [id number]    Therapy Diagnosis:   Encounter Diagnoses   Name Primary?    Total knee replacement status, right     Range of motion deficit     Muscle weakness     Gait difficulty      Physician: Araseli Liang MD    Visit Date: 4/24/2019    Physician Orders: PT Eval and Treat   Medical Diagnosis from Referral: Status post total right knee replacement  Evaluation Date: 3/27/2019  Authorization Period Expiration: 12/31/2019  Plan of Care Expiration: 6/27/2019  Visit # / Visits authorized: 10/ 50   PN Due:4/27/2019  Return to surgeon: 4/18/2019     DOS: 3/4/2019 POW: 7     Time In: 9:00 am  Time Out: 10:03 am  Total Billable Time: 53  minutes    Precautions: s/p right TKA      Subjective     Pt reports:that she is doing good. Pt denies any right knee pain today.   She was compliant with home exercise program.  Response to previous treatment: right leg soreness   Functional change: walking further distance     Pain: 0/10  Location: right knee        Objective     Updated 4/17/2019   Right knee flexion AROM 120 deg/ PROM 123   Right knee extension AROM: 0 deg    Keyona received therapeutic exercises to develop strength, endurance, ROM and flexibility for 55 minutes including:    Upright bike seated level 1 focusing ROM 8  min full revolutions  standing calf stretch 3 x 30''   standing heel raises 2x 15   Standing hip extension 2x15 YTB   standing hip abduction 2 x 15 ea YTB   Step ups and down 6 in box 3x10  Lateral step ups and down 6 in box  3x10  Wall mini squats 3x10    TKE 2x10  Sit to stand from 18 in plyobox 3x10  Shuttle MVP 1 black cord 2x15  Heel slides  5 min  +Machine knee extension 10# 3x10  +Machine knee flexion 20# 3x10  +Machine hip abd 10# 2x10   +Machine hip add 10# 2x10   +Shuttle map DL squat 2 black cord 3x10     NP     SL hip abd 2 x10   SL hip add 2 x10   Quad sets 2 x10 x 3''  Heel props  5 min 7 bs on  Neonatology Neonatology Neonatology Neonatology Neonatology Neonatology Neonatology Neonatology Neonatology Neonatology Neonatology Neonatology Neonatology Neonatology Neonatology Neonatology Neonatology Neonatology Neonatology Neonatology Neonatology Neonatology Neonatology top right knee - np   Clamshell YTB 2 x10    prone HSC 3 x 10   Prone hangs x 3' 2#   SLR 2 x10  + Bridges 2 x 10       Keyona received the following manual therapy techniques: Soft tissue Mobilization were applied to the: patella mobs for 00 minutes, including:  Patella mobs  Scar massage     Keyona received cold pack for 10 minutes to right knee.      Home Exercises Provided and Patient Education Provided     Education provided:   Proper technique to ensure quad activation    Written Home Exercises Provided: Patient instructed to cont prior HEP.  Exercises were reviewed and Keyona was able to demonstrate them prior to the end of the session.  Keyona demonstrated good  understanding of the education provided.     See EMR under Patient Instructions for exercises provided 4/2/2019.    Assessment     Patient tolerated treatment session well.  Pt cont to respond to right quadriceps and hamstring strengthening well. Pt required minimal v/c's to perform therapeutic exercises. Pt ambulates with good gait pattern heel to toes. Pt cont to increase right knee extension and flexion AROM.  Patient is steadily progressing towards her goals. Plan to progress towards d/c.     Pt prognosis is Good.      Pt will continue to benefit from skilled outpatient physical therapy to address the deficits listed in the problem list box on initial evaluation, provide pt/family education and to maximize pt's level of independence in the home and community environment.     Pt's spiritual, cultural and educational needs considered and pt agreeable to plan of care and goals.    Anticipated barriers to physical therapy: N/a    GOALS: Short Term Goals:  4 weeks  1.Report decreased in pain at worse less than  <   / =  5  /10  to increase tolerance for functional mobility. Goal in progress  2. Pt to improve right knee range of motion to 100 deg and right knee extension to 0 deg to allow for improved functional mobility to allow for  Neonatology improvement in IADLs. . Goal in progress  3. Increased right LE MMT 1/2 grade to increase tolerance for ADL and work activities.. Goal in progress  4. Pt to report ambulate without cane to improve quality of life . Goal in progress  5. Pt to tolerate HEP to improve ROM and independence with ADL's . Goal in progress     Long Term Goals: 8 weeks  1.Report decreased in pain at worse less than  <   / =  0  /10  to increase tolerance for functional mobility.. Goal in progress  2. Pt to improve right knee range of motion to greater than 115 deg and right knee extension to 0 deg to allow for improved functional mobility to allow for improvement in IADLs. . Goal in progress  3.Increased right LE MMT 1 grade to increase tolerance for ADL and work activities. . Goal in progress  4. Pt will report At least 40 percent but less than 60 percent on FOTO knee survey  to demonstrate increase in LE function with every day tasks. . Goal in progress  5. Pt to be Independent with HEP to improve ROM and independence with ADL's . Goal in progress      Plan   Plan to d/c in beginning of May, 2019    Plan of care Certification: 3/27/2019 to 6/27/2019.    Cont skilled PT session towards PT and patient's goals.    Vijay Choi, PT   04/24/2019         Neonatology Neonatology Neonatology

## 2019-04-24 ENCOUNTER — CLINICAL SUPPORT (OUTPATIENT)
Dept: REHABILITATION | Facility: HOSPITAL | Age: 64
End: 2019-04-24
Payer: COMMERCIAL

## 2019-04-24 DIAGNOSIS — Z96.651 TOTAL KNEE REPLACEMENT STATUS, RIGHT: ICD-10-CM

## 2019-04-24 DIAGNOSIS — R26.9 GAIT DIFFICULTY: ICD-10-CM

## 2019-04-24 DIAGNOSIS — M62.81 MUSCLE WEAKNESS: ICD-10-CM

## 2019-04-24 DIAGNOSIS — M25.60 RANGE OF MOTION DEFICIT: ICD-10-CM

## 2019-04-24 PROCEDURE — 97110 THERAPEUTIC EXERCISES: CPT | Mod: PN

## 2019-04-25 ENCOUNTER — TELEPHONE (OUTPATIENT)
Dept: ORTHOPEDICS | Facility: CLINIC | Age: 64
End: 2019-04-25

## 2019-04-25 NOTE — TELEPHONE ENCOUNTER
----- Message from Claudia Moise sent at 4/25/2019  1:16 PM CDT -----  Contact: [phone]  Pt is calling to speak with the nurse concerning a note that pt is still under doctor's care at this time  For her job.      Thank you!            Done   Faxed to  & [phone]  Mailed original to patients home

## 2019-04-26 ENCOUNTER — CLINICAL SUPPORT (OUTPATIENT)
Dept: REHABILITATION | Facility: HOSPITAL | Age: 64
End: 2019-04-26
Payer: COMMERCIAL

## 2019-04-26 DIAGNOSIS — Z96.651 TOTAL KNEE REPLACEMENT STATUS, RIGHT: ICD-10-CM

## 2019-04-26 DIAGNOSIS — R26.9 GAIT DIFFICULTY: ICD-10-CM

## 2019-04-26 DIAGNOSIS — M25.60 RANGE OF MOTION DEFICIT: ICD-10-CM

## 2019-04-26 DIAGNOSIS — M62.81 MUSCLE WEAKNESS: ICD-10-CM

## 2019-04-26 PROCEDURE — 97110 THERAPEUTIC EXERCISES: CPT | Mod: PN

## 2019-04-26 NOTE — PROGRESS NOTES
Physical Therapy Daily Treatment Note     Name: Keyona Herrera  Clinic Number: [id number]    Therapy Diagnosis:   Encounter Diagnoses   Name Primary?    Total knee replacement status, right     Range of motion deficit     Muscle weakness     Gait difficulty      Physician: Araseli Liang MD    Visit Date: 4/26/2019    Physician Orders: PT Eval and Treat   Medical Diagnosis from Referral: Status post total right knee replacement  Evaluation Date: 3/27/2019  Authorization Period Expiration: 12/31/2019  Plan of Care Expiration: 6/27/2019  Visit # / Visits authorized: 10/ 50   PN Due:4/27/2019  Return to surgeon: 4/18/2019     DOS: 3/4/2019 POW: 7     Time In: 10:00 am  Time Out: 11:03 am  Total Billable Time: 30  minutes    Precautions: s/p right TKA      Subjective     Pt reports:that she is doing good. Pt denies any right knee pain today.   She was compliant with home exercise program.  Response to previous treatment: right leg soreness   Functional change: walking further distance     Pain: 0/10  Location: right knee        Objective     Updated 4/17/2019   Right knee flexion AROM 120 deg/ PROM 123   Right knee extension AROM: 0 deg    Keyona received therapeutic exercises to develop strength, endurance, ROM and flexibility for 55 minutes including:    Upright bike seated level 1 focusing ROM 8  min full revolutions  standing calf stretch 3 x 30''   standing heel raises 2x 15   Standing hip extension 2x15 YTB   standing hip abduction 2 x 15 ea YTB   Step ups and down 6 in box 3x10  Lateral step ups and down 6 in box  3x10  Wall mini squats 3x10    TKE 2x10  Sit to stand from 18 in plyobox 3x10  Shuttle MVP 1 black cord 2x15  Heel slides  5 min  +Machine knee extension 10# 3x10  +Machine knee flexion 20# 3x10  +Machine hip abd 10# 2x10   +Machine hip add 10# 2x10   +Leg press 10#  3x10  +Treadmill 6 min at 2.5 mph    NP     SL hip abd 2 x10   SL hip add 2 x10   Quad sets 2 x10 x 3''  Heel props  5 min  7 bs on top right knee - np   Clamshell YTB 2 x10    prone HSC 3 x 10   Prone hangs x 3' 2#   SLR 2 x10  + Bridges 2 x 10       Keyona received the following manual therapy techniques: Soft tissue Mobilization were applied to the: patella mobs for 00 minutes, including:  Patella mobs  Scar massage     Keyona received cold pack for 10 minutes to right knee.      Home Exercises Provided and Patient Education Provided     Education provided:   Proper technique to ensure quad activation    Written Home Exercises Provided: Patient instructed to cont prior HEP.  Exercises were reviewed and Keyona was able to demonstrate them prior to the end of the session.  Keyona demonstrated good  understanding of the education provided.     See EMR under Patient Instructions for exercises provided 4/2/2019.    Assessment     Patient tolerated treatment session well.  Pt demonstrated good quadriceps activation. Pt demonstrated difficult with treadmill walk due to decrease muscle endurance. Pt cont to improve right knee AROM in flexion and extension. Pt will be d/c with HEP May first 2019.      Pt prognosis is Good.      Pt will continue to benefit from skilled outpatient physical therapy to address the deficits listed in the problem list box on initial evaluation, provide pt/family education and to maximize pt's level of independence in the home and community environment.     Pt's spiritual, cultural and educational needs considered and pt agreeable to plan of care and goals.    Anticipated barriers to physical therapy: N/a    GOALS: Short Term Goals:  4 weeks  1.Report decreased in pain at worse less than  <   / =  5  /10  to increase tolerance for functional mobility. Goal in progress  2. Pt to improve right knee range of motion to 100 deg and right knee extension to 0 deg to allow for improved functional mobility to allow for improvement in IADLs. . Goal in progress  3. Increased right LE MMT 1/2 grade to increase tolerance  for ADL and work activities.. Goal in progress  4. Pt to report ambulate without cane to improve quality of life . Goal in progress  5. Pt to tolerate HEP to improve ROM and independence with ADL's . Goal in progress     Long Term Goals: 8 weeks  1.Report decreased in pain at worse less than  <   / =  0  /10  to increase tolerance for functional mobility.. Goal in progress  2. Pt to improve right knee range of motion to greater than 115 deg and right knee extension to 0 deg to allow for improved functional mobility to allow for improvement in IADLs. . Goal in progress  3.Increased right LE MMT 1 grade to increase tolerance for ADL and work activities. . Goal in progress  4. Pt will report At least 40 percent but less than 60 percent on FOTO knee survey  to demonstrate increase in LE function with every day tasks. . Goal in progress  5. Pt to be Independent with HEP to improve ROM and independence with ADL's . Goal in progress      Plan   Plan to d/c in beginning of May, 2019    Plan of care Certification: 3/27/2019 to 6/27/2019.    Cont skilled PT session towards PT and patient's goals.    Vijay Choi, PT   04/26/2019

## 2019-04-29 ENCOUNTER — CLINICAL SUPPORT (OUTPATIENT)
Dept: REHABILITATION | Facility: HOSPITAL | Age: 64
End: 2019-04-29
Payer: COMMERCIAL

## 2019-04-29 DIAGNOSIS — Z96.651 TOTAL KNEE REPLACEMENT STATUS, RIGHT: ICD-10-CM

## 2019-04-29 DIAGNOSIS — M62.81 MUSCLE WEAKNESS: ICD-10-CM

## 2019-04-29 DIAGNOSIS — M25.60 RANGE OF MOTION DEFICIT: ICD-10-CM

## 2019-04-29 DIAGNOSIS — R26.9 GAIT DIFFICULTY: ICD-10-CM

## 2019-04-29 PROCEDURE — 97110 THERAPEUTIC EXERCISES: CPT | Mod: PN

## 2019-04-29 NOTE — PROGRESS NOTES
Physical Therapy Daily Treatment Note     Name: Keyona Herrera  Clinic Number: [id number]    Therapy Diagnosis:   Encounter Diagnoses   Name Primary?    Total knee replacement status, right     Range of motion deficit     Muscle weakness     Gait difficulty      Physician: Araseli Liang MD    Visit Date: 4/29/2019    Physician Orders: PT Eval and Treat   Medical Diagnosis from Referral: Status post total right knee replacement  Evaluation Date: 3/27/2019  Authorization Period Expiration: 12/31/2019  Plan of Care Expiration: 6/27/2019  Visit # / Visits authorized: 10/ 50   PN Due:4/27/2019  Return to surgeon: 4/18/2019     DOS: 3/4/2019 POW: 7     Time In: 0955  Time Out: 1105  Total Billable Time: 60  minutes    Precautions: s/p right TKA      Subjective     Pt reports that she is doing good today.    She was compliant with home exercise program.  Response to previous treatment: right leg soreness   Functional change: walking further distance     Pain: 0/10  Location: right knee        Objective     Updated 4/17/2019   Right knee flexion AROM 120 deg/ PROM 123   Right knee extension AROM: 0 deg    Keyona received therapeutic exercises to develop strength, endurance, ROM and flexibility for 55 minutes including:    Upright bike seated level 1 focusing ROM 8 min full revolutions  standing calf stretch 3 x 30''   standing heel raises 2x 15   Standing hip extension 2x15 Red TB   standing hip abduction 2 x 15 ea Red TB   Step ups and down 6 in box 3x10  Lateral step ups and down 6 in box  3x10  + TKE with Pink Cord 2x10  Sit to stand from 18 in plyobox 3x10  Shuttle MVP 1 black cord 2x15  Heel slides  5 min  Machine knee extension 10# 3x10  Machine knee flexion 20# 3x10  Machine hip abd 15# 2x10   Machine hip add 10# 2x10   Leg press 10#  3x10  Treadmill 6 min at 2.5 mph           Keyona received the following manual therapy techniques: Soft tissue Mobilization were applied to the: patella mobs for 00  minutes, including:  Patella mobs  Scar massage     Keyona received cold pack for 10 minutes to right knee.      Home Exercises Provided and Patient Education Provided     Education provided:   Proper technique to ensure quad activation    Written Home Exercises Provided: Patient instructed to cont prior HEP.  Exercises were reviewed and Keyona was able to demonstrate them prior to the end of the session.  Keyona demonstrated good  understanding of the education provided.     See EMR under Patient Instructions for exercises provided 4/2/2019.    Assessment     Patient tolerated treatment session very well.  Pt with good response to exercises with appropriate training effect achieved.  Pt displayed good recall of exercises demonstrating compliance with HEP.     Pt prognosis is Good.      Pt will continue to benefit from skilled outpatient physical therapy to address the deficits listed in the problem list box on initial evaluation, provide pt/family education and to maximize pt's level of independence in the home and community environment.     Pt's spiritual, cultural and educational needs considered and pt agreeable to plan of care and goals.    Anticipated barriers to physical therapy: N/a    GOALS: Short Term Goals:  4 weeks  1.Report decreased in pain at worse less than  <   / =  5  /10  to increase tolerance for functional mobility. Goal in progress  2. Pt to improve right knee range of motion to 100 deg and right knee extension to 0 deg to allow for improved functional mobility to allow for improvement in IADLs. . Goal in progress  3. Increased right LE MMT 1/2 grade to increase tolerance for ADL and work activities.. Goal in progress  4. Pt to report ambulate without cane to improve quality of life . Goal in progress  5. Pt to tolerate HEP to improve ROM and independence with ADL's . Goal in progress     Long Term Goals: 8 weeks  1.Report decreased in pain at worse less than  <   / =  0  /10  to  increase tolerance for functional mobility.. Goal in progress  2. Pt to improve right knee range of motion to greater than 115 deg and right knee extension to 0 deg to allow for improved functional mobility to allow for improvement in IADLs. . Goal in progress  3.Increased right LE MMT 1 grade to increase tolerance for ADL and work activities. . Goal in progress  4. Pt will report At least 40 percent but less than 60 percent on FOTO knee survey  to demonstrate increase in LE function with every day tasks. . Goal in progress  5. Pt to be Independent with HEP to improve ROM and independence with ADL's . Goal in progress      Plan   Plan to d/c in beginning of May, 2019      Lora Parish, PTA   04/29/2019

## 2019-04-30 NOTE — PROGRESS NOTES
Physical Therapy Daily Treatment Note     Name: Keyona Ellis Herrera  Clinic Number: [id number]    Therapy Diagnosis:   Encounter Diagnoses   Name Primary?    Total knee replacement status, right     Range of motion deficit     Muscle weakness     Gait difficulty      Physician: Araseli Liang MD    Visit Date: 5/1/2019    Physician Orders: PT Eval and Treat   Medical Diagnosis from Referral: Status post total right knee replacement  Evaluation Date: 3/27/2019  Authorization Period Expiration: 12/31/2019  Plan of Care Expiration: 6/27/2019  Visit # / Visits authorized: 13/ 50   PN Due:4/27/2019  Return to surgeon: 4/18/2019     DOS: 3/4/2019 POW: 8     Time In: 1:00 pm  Time Out: 2:00 pm  Total Billable Time: 60  minutes    Precautions: s/p right TKA      Subjective     Pt reports that she is doing good today.  Pt states she feel right knee stiffness in the morning., but it goes away as she walks  She was compliant with home exercise program.  Response to previous treatment: right leg soreness   Functional change: walking further distance     Pain: 0/10  Location: right knee        Objective     Updated 4/17/2019   Right knee flexion AROM 120 deg/ PROM 123   Right knee extension AROM: 0 deg    Keyona received therapeutic exercises to develop strength, endurance, ROM and flexibility for 55 minutes including:    Upright bike seated level 1 focusing ROM 8 min full revolutions  standing calf stretch 3 x 30''   standing heel raises 2x 15   Standing hip extension 2x15 Red TB   standing hip abduction 2 x 15 ea Red TB   Step ups and down 6 in box 3x10  Lateral step ups and down 6 in box  3x10  + TKE with Pink Cord 2x10  Sit to stand from 18 in plyobox 3x10  Shuttle MVP 1 black cord 2x15  Heel slides  5 min  Machine knee extension 10# 3x10  Machine knee flexion 20# 3x10  Machine hip abd 15# 2x10   Machine hip add 10# 2x10   Leg press 10#  3x10  Treadmill 6 min at 2.5 mph           Keyona received the following  manual therapy techniques: Soft tissue Mobilization were applied to the: patella mobs for 00 minutes, including:  Patella mobs  Scar massage     Keyona received cold pack for 10 minutes to right knee.      Home Exercises Provided and Patient Education Provided     Education provided:   Proper technique to ensure quad activation    Written Home Exercises Provided: Patient instructed to cont prior HEP.  Exercises were reviewed and Keyona was able to demonstrate them prior to the end of the session.  Keyona demonstrated good  understanding of the education provided.     See EMR under Patient Instructions for exercises provided 4/2/2019.    Assessment     Patient tolerated treatment session very well. Pt is responded well to right knee flexion and extension range of motion. Pt has been demonstrated improvement in right LE muscle strength. Pt cont to ambulate without any gait pattern deviations.  Pt displayed good recall of exercises demonstrating compliance with HEP.     Pt prognosis is Good.      Pt will continue to benefit from skilled outpatient physical therapy to address the deficits listed in the problem list box on initial evaluation, provide pt/family education and to maximize pt's level of independence in the home and community environment.     Pt's spiritual, cultural and educational needs considered and pt agreeable to plan of care and goals.    Anticipated barriers to physical therapy: N/a    GOALS: Short Term Goals:  4 weeks  1.Report decreased in pain at worse less than  <   / =  5  /10  to increase tolerance for functional mobility. Goal in progress  2. Pt to improve right knee range of motion to 100 deg and right knee extension to 0 deg to allow for improved functional mobility to allow for improvement in IADLs. . Goal in progress  3. Increased right LE MMT 1/2 grade to increase tolerance for ADL and work activities.. Goal in progress  4. Pt to report ambulate without cane to improve quality of  life . Goal in progress  5. Pt to tolerate HEP to improve ROM and independence with ADL's . Goal in progress     Long Term Goals: 8 weeks  1.Report decreased in pain at worse less than  <   / =  0  /10  to increase tolerance for functional mobility.. Goal in progress  2. Pt to improve right knee range of motion to greater than 115 deg and right knee extension to 0 deg to allow for improved functional mobility to allow for improvement in IADLs. . Goal in progress  3.Increased right LE MMT 1 grade to increase tolerance for ADL and work activities. . Goal in progress  4. Pt will report At least 40 percent but less than 60 percent on FOTO knee survey  to demonstrate increase in LE function with every day tasks. . Goal in progress  5. Pt to be Independent with HEP to improve ROM and independence with ADL's . Goal in progress      Plan   Plan to d/c in beginning of May, 2019      Vijay Choi, PT   05/01/2019

## 2019-05-01 ENCOUNTER — CLINICAL SUPPORT (OUTPATIENT)
Dept: REHABILITATION | Facility: HOSPITAL | Age: 64
End: 2019-05-01
Payer: COMMERCIAL

## 2019-05-01 DIAGNOSIS — M62.81 MUSCLE WEAKNESS: ICD-10-CM

## 2019-05-01 DIAGNOSIS — R26.9 GAIT DIFFICULTY: ICD-10-CM

## 2019-05-01 DIAGNOSIS — Z96.651 TOTAL KNEE REPLACEMENT STATUS, RIGHT: ICD-10-CM

## 2019-05-01 DIAGNOSIS — M25.60 RANGE OF MOTION DEFICIT: ICD-10-CM

## 2019-05-01 PROCEDURE — 97110 THERAPEUTIC EXERCISES: CPT | Mod: PN

## 2019-05-07 ENCOUNTER — CLINICAL SUPPORT (OUTPATIENT)
Dept: REHABILITATION | Facility: HOSPITAL | Age: 64
End: 2019-05-07
Payer: COMMERCIAL

## 2019-05-07 DIAGNOSIS — R26.9 GAIT DIFFICULTY: ICD-10-CM

## 2019-05-07 DIAGNOSIS — Z96.651 TOTAL KNEE REPLACEMENT STATUS, RIGHT: ICD-10-CM

## 2019-05-07 DIAGNOSIS — M62.81 MUSCLE WEAKNESS: ICD-10-CM

## 2019-05-07 DIAGNOSIS — M25.60 RANGE OF MOTION DEFICIT: ICD-10-CM

## 2019-05-07 PROCEDURE — 97110 THERAPEUTIC EXERCISES: CPT | Mod: PN

## 2019-05-07 NOTE — PROGRESS NOTES
Physical Therapy Daily Treatment Note     Name: Keyona Ellis Herrera  Clinic Number: [id number]    Therapy Diagnosis:   Encounter Diagnoses   Name Primary?    Total knee replacement status, right     Range of motion deficit     Muscle weakness     Gait difficulty      Physician: Araseli Liang MD    Visit Date: 5/7/2019    Physician Orders: PT Eval and Treat   Medical Diagnosis from Referral: Status post total right knee replacement  Evaluation Date: 3/27/2019  Authorization Period Expiration: 12/31/2019  Plan of Care Expiration: 6/27/2019  Visit # / Visits authorized: 14/ 50   PN Due:4/27/2019  Return to surgeon: 4/18/2019     DOS: 3/4/2019 POW: 9     Time In: 10:00 am  Time Out: 11:05 am  Total Billable Time: 55  minutes    Precautions: s/p right TKA      Subjective     Pt reports that she is doing good today.    She was compliant with home exercise program.  Response to previous treatment: right leg soreness   Functional change: walking further distance     Pain: 0/10  Location: right knee        Objective     CMS Impairment/Limitation/Restriction for FOTO Knee Survey  Status Limitation G-Code CMS Severity Modifier  Intake 30% 70%  Predicted 56% 44% Goal Status+ CK - At least 40 percent but less than 60 percent  5/7/2019 70% 30% Current Status CJ - At least 20 percent but less than 40 percent  D/C Status CJ **only report if this is discharge survey    Updated 5/7/2019   Right knee flexion AROM 120 deg/ PROM 123   Right knee extension AROM: 0 deg    Overall Right LE muscle strength 4+/5.     Keyona received therapeutic exercises to develop strength, endurance, ROM and flexibility for 55 minutes including:    Upright bike seated level 1 focusing ROM 8 min full revolutions  standing calf stretch 3 x 30''   standing heel raises 2x 15   Standing hip extension 2x15 Red TB   standing hip abduction 2 x 15 ea Red TB   Step ups and down 6 in box 3x10  Lateral step ups and down 6 in box  3x10  + TKE with Pink Cord  2x10  Sit to stand from 18 in plyobox 3x10  Shuttle MVP 1 black cord 2x15  Heel slides  5 min  Machine knee extension 10# 3x10  Machine knee flexion 20# 3x10  Machine hip abd 15# 2x10   Machine hip add 10# 2x10   Leg press 10#  3x10  Treadmill 6 min at 2.5 mph           Keyona received the following manual therapy techniques: Soft tissue Mobilization were applied to the: patella mobs for 00 minutes, including:  Patella mobs  Scar massage     Keyona received cold pack for 10 minutes to right knee.      Home Exercises Provided and Patient Education Provided     Education provided:   Proper technique to ensure quad activation    Written Home Exercises Provided: Patient instructed to cont prior HEP.  Exercises were reviewed and Keyona was able to demonstrate them prior to the end of the session.  Keyona demonstrated good  understanding of the education provided.     See EMR under Patient Instructions for exercises provided 4/2/2019.    Assessment     Patient tolerated treatment session very well. Pt is 9 weeks s/p right TKA. Pt is doing well. Pt was re-assessed today. Pt demonstrated right knee flexion AROM 120 deg and extension of 0 deg. Pt demonstrated overall LE muscle strength of 4+/5. Pt has been compliant with HEP. FOTO knee survey demonstrated only 30% impairment. Pt has met 5/5 LTGs. Overall, pt tolerated therapy well. Pt will be d/c from skilled PT services with HEP today.         Pt prognosis is Good.      Pt will continue to benefit from skilled outpatient physical therapy to address the deficits listed in the problem list box on initial evaluation, provide pt/family education and to maximize pt's level of independence in the home and community environment.     Pt's spiritual, cultural and educational needs considered and pt agreeable to plan of care and goals.    Anticipated barriers to physical therapy: N/a    GOALS: Short Term Goals:  4 weeks  1.Report decreased in pain at worse less than  <   / =   5  /10  to increase tolerance for functional mobility. Goal in progress  2. Pt to improve right knee range of motion to 100 deg and right knee extension to 0 deg to allow for improved functional mobility to allow for improvement in IADLs. . Goal in progress  3. Increased right LE MMT 1/2 grade to increase tolerance for ADL and work activities.. Goal in progress  4. Pt to report ambulate without cane to improve quality of life . Goal in progress  5. Pt to tolerate HEP to improve ROM and independence with ADL's . Goal in progress     Long Term Goals: 8 weeks updated 5/7/2019  1.Report decreased in pain at worse less than  <   / =  0  /10  to increase tolerance for functional mobility.. Goal met  2. Pt to improve right knee range of motion to greater than 115 deg and right knee extension to 0 deg to allow for improved functional mobility to allow for improvement in IADLs. . Goal met  3.Increased right LE MMT 1 grade to increase tolerance for ADL and work activities. . Goal met  4. Pt will report At least 40 percent but less than 60 percent on FOTO knee survey  to demonstrate increase in LE function with every day tasks. . Goal met  5. Pt to be Independent with HEP to improve ROM and independence with ADL's . Goal met      Plan   Plan to be d/c today with HEP.       Vijay Choi, PT   05/07/2019

## 2019-05-18 NOTE — TREATMENT PLAN
Occupational Therapy Discharge Summary    Keyona Herrera  MRN: [id number]   Principal Problem: Primary osteoarthritis of right knee      Patient Discharged from acute Occupational Therapy on 5/7/19.  Please refer to prior OT note dated 5/5/19 for functional status.    Assessment:      Patient was discharged unexpectedly.  Information required to complete an accurate discharge summary is unknown.  Refer to therapy initial evaluation and last progress note for initial and most recent functional status and goal achievement.  Recommendations made may be found in medical record.    Objective:     GOALS:   Multidisciplinary Problems     Occupational Therapy Goals        Problem: Occupational Therapy Goal    Goal Priority Disciplines Outcome Interventions   Occupational Therapy Goal     OT, PT/OT Ongoing (interventions implemented as appropriate)    Description:  Goals to be met by: 3/15/19     Patient will increase functional independence with ADLs by performing:    UE Dressing with Modified [place].  LE Dressing with Supervision using A/E with RW to stand.  Grooming while standing with RW with Supervision.  Toileting from bedside commode with Supervision for hygiene and clothing management.   Supine to sit with Modified [place].  Stand pivot transfers with Supervision with RW to bed and DME..                      Reasons for Discontinuation of Therapy Services  Transfer to alternate level of care.      Plan:     Patient Discharged to: Home with Home Health Service    Uriel Fuentes OTR/L  5/18/2019

## 2019-05-30 ENCOUNTER — OFFICE VISIT (OUTPATIENT)
Dept: ORTHOPEDICS | Facility: CLINIC | Age: 64
End: 2019-05-30
Payer: COMMERCIAL

## 2019-05-30 VITALS — BODY MASS INDEX: 38.45 KG/M2 | HEIGHT: 63 IN | WEIGHT: 217 LBS

## 2019-05-30 DIAGNOSIS — Z96.651 STATUS POST TOTAL RIGHT KNEE REPLACEMENT: Primary | ICD-10-CM

## 2019-05-30 PROCEDURE — 99999 PR PBB SHADOW E&M-EST. PATIENT-LVL II: CPT | Mod: PBBFAC,,, | Performed by: ORTHOPAEDIC SURGERY

## 2019-05-30 PROCEDURE — 99024 POSTOP FOLLOW-UP VISIT: CPT | Mod: S$GLB,,, | Performed by: ORTHOPAEDIC SURGERY

## 2019-05-30 PROCEDURE — 99999 PR PBB SHADOW E&M-EST. PATIENT-LVL II: ICD-10-PCS | Mod: PBBFAC,,, | Performed by: ORTHOPAEDIC SURGERY

## 2019-05-30 PROCEDURE — 99024 PR POST-OP FOLLOW-UP VISIT: ICD-10-PCS | Mod: S$GLB,,, | Performed by: ORTHOPAEDIC SURGERY

## 2019-07-24 LAB
CHOL/HDLC RATIO: 4.6
CHOLESTEROL, TOTAL: 244 (ref 100–200)
HDLC SERPL-MCNC: 53 MG/DL
LDLC SERPL CALC-MCNC: 156 MG/DL
TRIGL SERPL-MCNC: 178 MG/DL
VLDL CHOLESTEROL: 36 MG/DL (ref 5–40)

## 2019-08-01 ENCOUNTER — TELEPHONE (OUTPATIENT)
Dept: ADMINISTRATIVE | Facility: HOSPITAL | Age: 64
End: 2019-08-01

## 2019-08-20 ENCOUNTER — OFFICE VISIT (OUTPATIENT)
Dept: FAMILY MEDICINE | Facility: CLINIC | Age: 64
End: 2019-08-20
Payer: COMMERCIAL

## 2019-08-20 ENCOUNTER — HOSPITAL ENCOUNTER (OUTPATIENT)
Dept: RADIOLOGY | Facility: HOSPITAL | Age: 64
Discharge: HOME | End: 2019-08-20
Attending: NURSE PRACTITIONER
Payer: COMMERCIAL

## 2019-08-20 VITALS
OXYGEN SATURATION: 95 % | SYSTOLIC BLOOD PRESSURE: 125 MMHG | HEIGHT: 63 IN | DIASTOLIC BLOOD PRESSURE: 82 MMHG | TEMPERATURE: 98 F | BODY MASS INDEX: 39.39 KG/M2 | WEIGHT: 222.31 LBS | HEART RATE: 94 BPM

## 2019-08-20 DIAGNOSIS — Z96.651 STATUS POST TOTAL RIGHT KNEE REPLACEMENT: ICD-10-CM

## 2019-08-20 DIAGNOSIS — M25.60 RANGE OF MOTION DEFICIT: ICD-10-CM

## 2019-08-20 DIAGNOSIS — M25.562 ACUTE PAIN OF LEFT KNEE: Primary | ICD-10-CM

## 2019-08-20 DIAGNOSIS — M25.562 ACUTE PAIN OF LEFT KNEE: ICD-10-CM

## 2019-08-20 DIAGNOSIS — M62.81 MUSCLE WEAKNESS: ICD-10-CM

## 2019-08-20 DIAGNOSIS — R26.9 GAIT DIFFICULTY: ICD-10-CM

## 2019-08-20 DIAGNOSIS — E78.5 HYPERLIPIDEMIA, UNSPECIFIED HYPERLIPIDEMIA TYPE: ICD-10-CM

## 2019-08-20 DIAGNOSIS — M17.0 PRIMARY OSTEOARTHRITIS OF BOTH KNEES: ICD-10-CM

## 2019-08-20 PROCEDURE — 3079F DIAST BP 80-89 MM HG: CPT | Mod: CPTII,S$GLB,, | Performed by: NURSE PRACTITIONER

## 2019-08-20 PROCEDURE — 3008F PR BODY MASS INDEX (BMI) DOCUMENTED: ICD-10-PCS | Mod: CPTII,S$GLB,, | Performed by: NURSE PRACTITIONER

## 2019-08-20 PROCEDURE — 3008F BODY MASS INDEX DOCD: CPT | Mod: CPTII,S$GLB,, | Performed by: NURSE PRACTITIONER

## 2019-08-20 PROCEDURE — 3079F PR MOST RECENT DIASTOLIC BLOOD PRESSURE 80-89 MM HG: ICD-10-PCS | Mod: CPTII,S$GLB,, | Performed by: NURSE PRACTITIONER

## 2019-08-20 PROCEDURE — 99214 OFFICE O/P EST MOD 30 MIN: CPT | Mod: S$GLB,,, | Performed by: NURSE PRACTITIONER

## 2019-08-20 PROCEDURE — 73565 X-RAY EXAM OF KNEES: CPT | Mod: 26,,, | Performed by: RADIOLOGY

## 2019-08-20 PROCEDURE — 3074F SYST BP LT 130 MM HG: CPT | Mod: CPTII,S$GLB,, | Performed by: NURSE PRACTITIONER

## 2019-08-20 PROCEDURE — 99999 PR PBB SHADOW E&M-EST. PATIENT-LVL IV: ICD-10-PCS | Mod: PBBFAC,,, | Performed by: NURSE PRACTITIONER

## 2019-08-20 PROCEDURE — 99999 PR PBB SHADOW E&M-EST. PATIENT-LVL IV: CPT | Mod: PBBFAC,,, | Performed by: NURSE PRACTITIONER

## 2019-08-20 PROCEDURE — 73565 XR KNEE AP STANDING BILATERAL: ICD-10-PCS | Mod: 26,,, | Performed by: RADIOLOGY

## 2019-08-20 PROCEDURE — 3074F PR MOST RECENT SYSTOLIC BLOOD PRESSURE < 130 MM HG: ICD-10-PCS | Mod: CPTII,S$GLB,, | Performed by: NURSE PRACTITIONER

## 2019-08-20 PROCEDURE — 73565 X-RAY EXAM OF KNEES: CPT | Mod: TC,FY,PO

## 2019-08-20 PROCEDURE — 99214 PR OFFICE/OUTPT VISIT, EST, LEVL IV, 30-39 MIN: ICD-10-PCS | Mod: S$GLB,,, | Performed by: NURSE PRACTITIONER

## 2019-08-20 RX ORDER — DICLOFENAC SODIUM 25 MG/1
25 TABLET, DELAYED RELEASE ORAL 2 TIMES DAILY
Qty: 60 TABLET | Refills: 0 | Status: SHIPPED | OUTPATIENT
Start: 2019-08-20 | End: 2019-09-19

## 2019-08-20 NOTE — PROGRESS NOTES
History of Present Illness   Keyona Herrera is a 63 y.o. woman with medical history as listed below who presents today for evaluation of acute left knee pain. She is s/p right TKA; she completed PT and has been exercising on her own for some time. She reports that over the last few weeks, she has noticed pain to the left knee mostly posterior. The pain is described as a deep ache and is intermittent. There is no swelling, redness, warmth, or obvious deformity. She denies injury or trauma. She states she was told that her left knee was not ready for surgery at the time of right TKA, but is concerned she may have put additional strain on the knee with the exercise she has been doing. She has tried a dose of Tylenol of Advil OTC with temporary improvement. She has no additional complaints and is otherwise healthy on today's visit.    Past Medical History:   Diagnosis Date    HTN (hypertension)     Hyperlipidemia     Obesity        Past Surgical History:   Procedure Laterality Date     SECTION      2    CHOLECYSTECTOMY      COLONOSCOPY N/A 2016    [facility]    HYSTERECTOMY      INCISIONAL BREAST BIOPSY      REPLACEMENT-KNEE-TOTAL Right 3/4/2019    [facility]    surgery on foot         Social History     Socioeconomic History    Marital status:      Spouse name: Not on file    Number of children: Not on file    Years of education: Not on file    Highest education level: Not on file   Occupational History    Not on file   Social Needs    Financial resource strain: Not on file    Food insecurity:     Worry: Not on file     Inability: Not on file    Transportation needs:     Medical: Not on file     Non-medical: Not on file   Tobacco Use    Smoking status: Never Smoker    Smokeless tobacco: Never Used   Substance and Sexual Activity    Alcohol use: Yes     Comment: socail drinker/ not often    Drug  "use: No    Sexual activity: Yes     Partners: Male   Lifestyle    Physical activity:     Days per week: Not on file     Minutes per session: Not on file    Stress: Not on file   Relationships    Social connections:     Talks on phone: Not on file     Gets together: Not on file     Attends [employer] service: Not on file     Active member of club or organization: Not on file     Attends meetings of clubs or organizations: Not on file     Relationship status: Not on file   Other Topics Concern    Not on file   Social History Narrative    Not on file       Family History   Problem Relation Age of Onset    Dementia Mother     Aneurysm Father         AAA    Hypertension Unknown     Cancer Maternal Grandfather         colon cancer - ? age of onset       Review of Systems  Review of Systems   Constitutional: Negative for fever.   Cardiovascular: Negative for claudication and leg swelling.   Musculoskeletal: Positive for joint pain. Negative for falls.   Neurological: Negative for tingling, sensory change and focal weakness.     A complete review of systems was otherwise negative.    Physical Exam  /82   Pulse 94   Temp 97.5 °F (36.4 °C) (Oral)   Ht 5' 3" (1.6 m)   Wt 100.8 kg (222 lb 5.3 oz)   SpO2 95%   BMI 39.38 kg/m²   General appearance: alert, appears stated age, cooperative and no distress  Lungs: clear to auscultation bilaterally  Heart: regular rate and rhythm, S1, S2 normal, no murmur, click, rub or gallop  Extremities: extremities normal, atraumatic, no cyanosis or edema, Homans sign is negative, no sign of DVT and no edema, redness or tenderness in the calves or thighs  Pulses: 2+ and symmetric  Skin: Skin color, texture, turgor normal. No rashes or lesions  Neurologic: Grossly normal  Musculoskeletal: left knee exhibits FROM with no TTP or obvious deformity; negative for instability, crepitus, erythema, or warmth; distal sensation, ROM, or pulses intact.    Assessment/Plan  Acute pain of " left knee  X-ray for evaluation- worsening osteoarthritis/degenerative change.  Diclofenac BID with RICE.  We discussed pool exercise and therapy to limit weight bearing on the left knee.  Close follow-up with her orthopedic surgeon pending results.  -     X-Ray Knee AP Standing Bilateral; Future; Expected date: 08/20/2019  -     diclofenac (VOLTAREN) 25 MG TbEC; Take 1 tablet (25 mg total) by mouth 2 (two) times daily.  Dispense: 60 tablet; Refill: 0    Primary osteoarthritis of both knees  The current medical regimen is effective;  continue present plan and medications.  As above.    Status post total right knee replacement 3/4/2019  The current medical regimen is effective;  continue present plan and medications.  As above.    Gait difficulty  The current medical regimen is effective;  continue present plan and medications.  As above.    Range of motion deficit  The current medical regimen is effective;  continue present plan and medications.  As above.    Muscle weakness  The current medical regimen is effective;  continue present plan and medications.  As above.    Hyperlipidemia, unspecified hyperlipidemia type  The current medical regimen is effective;  continue present plan and medications.    Patient has verbalized understanding and is in agreement with plan of care.    Follow up if symptoms worsen or fail to improve.

## 2019-11-25 ENCOUNTER — TELEPHONE (OUTPATIENT)
Dept: ORTHOPEDICS | Facility: CLINIC | Age: 64
End: 2019-11-25

## 2019-11-25 NOTE — TELEPHONE ENCOUNTER
----- Message from Tiffanie Kong sent at 11/25/2019 10:44 AM CST -----  Contact: pt  Reason: Pt states a antibiotic is supposed to be called in so she can go to the dentist but she have not heard anything yet. She need this to schedule.    Communication: [phone]      We spoke  She may take Amoxicilin, she said the dentist will call it in

## 2020-02-26 LAB — HPV MRNA E6/E7: NORMAL

## 2020-03-25 PROBLEM — M62.81 MUSCLE WEAKNESS: Status: RESOLVED | Noted: 2019-03-27 | Resolved: 2020-03-25

## 2020-03-25 PROBLEM — M25.60 RANGE OF MOTION DEFICIT: Status: RESOLVED | Noted: 2019-03-27 | Resolved: 2020-03-25

## 2020-03-25 PROBLEM — Z96.651 TOTAL KNEE REPLACEMENT STATUS, RIGHT: Status: RESOLVED | Noted: 2019-03-27 | Resolved: 2020-03-25

## 2020-03-25 PROBLEM — R26.9 GAIT DIFFICULTY: Status: RESOLVED | Noted: 2019-03-27 | Resolved: 2020-03-25

## 2020-04-23 ENCOUNTER — DOCUMENT SCAN (OUTPATIENT)
Dept: HOME HEALTH SERVICES | Facility: HOSPITAL | Age: 65
End: 2020-04-23
Payer: COMMERCIAL

## 2020-07-09 ENCOUNTER — EXTERNAL HOME HEALTH (OUTPATIENT)
Dept: HOME HEALTH SERVICES | Facility: HOSPITAL | Age: 65
End: 2020-07-09
Payer: COMMERCIAL

## 2020-07-10 ENCOUNTER — DOCUMENT SCAN (OUTPATIENT)
Dept: HOME HEALTH SERVICES | Facility: HOSPITAL | Age: 65
End: 2020-07-10
Payer: COMMERCIAL

## 2020-10-22 ENCOUNTER — OFFICE VISIT (OUTPATIENT)
Dept: FAMILY MEDICINE | Facility: CLINIC | Age: 65
End: 2020-10-22
Payer: COMMERCIAL

## 2020-10-22 VITALS
HEART RATE: 72 BPM | DIASTOLIC BLOOD PRESSURE: 90 MMHG | WEIGHT: 223.44 LBS | TEMPERATURE: 98 F | BODY MASS INDEX: 39.59 KG/M2 | SYSTOLIC BLOOD PRESSURE: 134 MMHG | OXYGEN SATURATION: 95 % | HEIGHT: 63 IN

## 2020-10-22 DIAGNOSIS — R03.0 ELEVATED BLOOD PRESSURE READING: ICD-10-CM

## 2020-10-22 DIAGNOSIS — M25.562 ACUTE PAIN OF LEFT KNEE: Primary | ICD-10-CM

## 2020-10-22 PROCEDURE — 3080F DIAST BP >= 90 MM HG: CPT | Mod: CPTII,S$GLB,, | Performed by: NURSE PRACTITIONER

## 2020-10-22 PROCEDURE — 3080F PR MOST RECENT DIASTOLIC BLOOD PRESSURE >= 90 MM HG: ICD-10-PCS | Mod: CPTII,S$GLB,, | Performed by: NURSE PRACTITIONER

## 2020-10-22 PROCEDURE — 99999 PR PBB SHADOW E&M-EST. PATIENT-LVL V: CPT | Mod: PBBFAC,,, | Performed by: NURSE PRACTITIONER

## 2020-10-22 PROCEDURE — 3075F SYST BP GE 130 - 139MM HG: CPT | Mod: CPTII,S$GLB,, | Performed by: NURSE PRACTITIONER

## 2020-10-22 PROCEDURE — 3008F PR BODY MASS INDEX (BMI) DOCUMENTED: ICD-10-PCS | Mod: CPTII,S$GLB,, | Performed by: NURSE PRACTITIONER

## 2020-10-22 PROCEDURE — 99214 PR OFFICE/OUTPT VISIT, EST, LEVL IV, 30-39 MIN: ICD-10-PCS | Mod: S$GLB,,, | Performed by: NURSE PRACTITIONER

## 2020-10-22 PROCEDURE — 3075F PR MOST RECENT SYSTOLIC BLOOD PRESS GE 130-139MM HG: ICD-10-PCS | Mod: CPTII,S$GLB,, | Performed by: NURSE PRACTITIONER

## 2020-10-22 PROCEDURE — 99214 OFFICE O/P EST MOD 30 MIN: CPT | Mod: S$GLB,,, | Performed by: NURSE PRACTITIONER

## 2020-10-22 PROCEDURE — 99999 PR PBB SHADOW E&M-EST. PATIENT-LVL V: ICD-10-PCS | Mod: PBBFAC,,, | Performed by: NURSE PRACTITIONER

## 2020-10-22 PROCEDURE — 3008F BODY MASS INDEX DOCD: CPT | Mod: CPTII,S$GLB,, | Performed by: NURSE PRACTITIONER

## 2020-10-22 RX ORDER — MELOXICAM 7.5 MG/1
7.5 TABLET ORAL DAILY
Qty: 90 TABLET | Refills: 0 | Status: SHIPPED | OUTPATIENT
Start: 2020-10-22 | End: 2021-01-20

## 2020-10-22 NOTE — PATIENT INSTRUCTIONS
Mobic 7.5 mg one daily x 10 days then as needed  Apply ice to left knee in the evening  Referral to orthopedics

## 2020-10-22 NOTE — PROGRESS NOTES
Subjective:       Patient ID: Keyona Herrera is a 64 y.o. female.    Chief Complaint: Knee Pain (Left    3 days)    64-year-old female presents to the clinic today with complaint of left knee pain x3 days.  She denies any known trauma.  However, she did notice the left knee started hurting after she did a lot of house cleaning.  She has been taking Advil and Tylenol with out much relief.  However, her knee only hurts when she walks on it.  She would like to see Dr. John [facility]'s  associate for evaluation of her left knee because he did her total right knee replacement on 2019.  She denies any cardiac chest pain, heart palpitations, shortness of breath, or swelling to lower extremities.  She has mild headaches which she thinks is related to her knee pain.  She denies any dizziness or blurred vision.  Her blood pressure today is 134/90.  She thinks her blood pressure is slightly elevated due to the fact that she is in pain presently.    Past Medical History:   Diagnosis Date    HTN (hypertension)     Hyperlipidemia     Obesity      Past Surgical History:   Procedure Laterality Date     SECTION      2    CHOLECYSTECTOMY      COLONOSCOPY N/A 2016    Procedure: COLONOSCOPY;  Surgeon: Edy Chanel MD;  Location: [facility];  Service: Endoscopy;  Laterality: N/A;    HYSTERECTOMY      INCISIONAL BREAST BIOPSY      surgery on foot      TOTAL KNEE ARTHROPLASTY Right 3/4/2019    Procedure: REPLACEMENT-KNEE-TOTAL;  Surgeon: [facility] L. [facility] Jr., MD;  Location: [address];  Service: Orthopedics;  Laterality: Right;      reports that she has never smoked. She has never used smokeless tobacco. She reports current alcohol use. She reports that she does not use drugs.  Review of Systems   Respiratory: Negative for cough, shortness of breath and wheezing.    Cardiovascular: Negative for chest pain, palpitations and leg swelling.   Gastrointestinal: Negative for abdominal pain, diarrhea, nausea  and vomiting.   Musculoskeletal: Negative for gait problem.        Left knee pain    Neurological: Positive for headaches. Negative for dizziness and light-headedness.       Objective:      Physical Exam  Constitutional:       General: She is not in acute distress.     Appearance: Normal appearance. She is not ill-appearing, toxic-appearing or diaphoretic.   Cardiovascular:      Rate and Rhythm: Normal rate.      Heart sounds: Normal heart sounds. No murmur.   Pulmonary:      Effort: Pulmonary effort is normal.      Breath sounds: Normal breath sounds. No wheezing or rhonchi.   Abdominal:      General: Bowel sounds are normal.      Palpations: Abdomen is soft.      Tenderness: There is no abdominal tenderness.   Musculoskeletal: Normal range of motion.         General: Swelling present. No tenderness.      Comments: Left knee slight amount of swelling noted non-tender to palpation FROM without any difficulty able to turn lower leg inward and outward without any difficulty normal gait well healed incision right knee previous right total knee replacement normal gait    Skin:     General: Skin is warm and dry.   Neurological:      Mental Status: She is alert and oriented to person, place, and time.   Psychiatric:         Mood and Affect: Mood normal.         Behavior: Behavior normal.         Thought Content: Thought content normal.         Judgment: Judgment normal.         Assessment:       1. Acute pain of left knee    2. Elevated blood pressure reading        Plan:         Acute pain of left knee  -     meloxicam (MOBIC) 7.5 MG tablet; Take 1 tablet (7.5 mg total) by mouth once daily.  Dispense: 90 tablet; Refill: 0  -     Ambulatory referral/consult to Orthopedics; Future; Expected date: 10/29/2020    Elevated blood pressure reading  - follow up with Dr. Guajardo on 10/30/2020 to establish care and a physical

## 2020-10-27 DIAGNOSIS — M25.569 KNEE PAIN, UNSPECIFIED CHRONICITY, UNSPECIFIED LATERALITY: Primary | ICD-10-CM

## 2020-10-28 ENCOUNTER — PATIENT OUTREACH (OUTPATIENT)
Dept: ADMINISTRATIVE | Facility: OTHER | Age: 65
End: 2020-10-28

## 2021-03-03 LAB
CTP QC/QA: YES
FECAL OCCULT BLOOD, POC: NEGATIVE

## 2021-07-29 LAB
CHOL/HDLC RATIO: 4.2
CHOLEST SERPL-MSCNC: 222 MG/DL (ref 0–200)
GLUCOSE: 120
HDLC SERPL-MCNC: 53 MG/DL
LDLC SERPL CALC-MCNC: 129 MG/DL
TRIGLYCERIDE (LIPID PAN): 198

## 2021-08-24 ENCOUNTER — PATIENT OUTREACH (OUTPATIENT)
Dept: ADMINISTRATIVE | Facility: HOSPITAL | Age: 66
End: 2021-08-24

## 2021-08-26 ENCOUNTER — TELEPHONE (OUTPATIENT)
Dept: FAMILY MEDICINE | Facility: CLINIC | Age: 66
End: 2021-08-26

## 2021-09-16 ENCOUNTER — PATIENT MESSAGE (OUTPATIENT)
Dept: ADMINISTRATIVE | Facility: HOSPITAL | Age: 66
End: 2021-09-16

## 2021-09-16 ENCOUNTER — PATIENT OUTREACH (OUTPATIENT)
Dept: ADMINISTRATIVE | Facility: HOSPITAL | Age: 66
End: 2021-09-16

## 2021-09-16 DIAGNOSIS — Z11.59 NEED FOR HEPATITIS C SCREENING TEST: Primary | ICD-10-CM

## 2021-09-24 ENCOUNTER — TELEPHONE (OUTPATIENT)
Dept: FAMILY MEDICINE | Facility: CLINIC | Age: 66
End: 2021-09-24

## 2022-01-18 ENCOUNTER — OFFICE VISIT (OUTPATIENT)
Dept: FAMILY MEDICINE | Facility: CLINIC | Age: 67
End: 2022-01-18
Payer: COMMERCIAL

## 2022-01-18 VITALS
TEMPERATURE: 98 F | WEIGHT: 224.19 LBS | HEART RATE: 79 BPM | BODY MASS INDEX: 39.72 KG/M2 | HEIGHT: 63 IN | SYSTOLIC BLOOD PRESSURE: 128 MMHG | OXYGEN SATURATION: 97 % | DIASTOLIC BLOOD PRESSURE: 72 MMHG

## 2022-01-18 DIAGNOSIS — E78.5 HYPERLIPIDEMIA, UNSPECIFIED HYPERLIPIDEMIA TYPE: Primary | ICD-10-CM

## 2022-01-18 DIAGNOSIS — E66.9 CLASS 2 OBESITY WITH BODY MASS INDEX (BMI) OF 38.0 TO 38.9 IN ADULT, UNSPECIFIED OBESITY TYPE, UNSPECIFIED WHETHER SERIOUS COMORBIDITY PRESENT: ICD-10-CM

## 2022-01-18 PROCEDURE — 99214 PR OFFICE/OUTPT VISIT, EST, LEVL IV, 30-39 MIN: ICD-10-PCS | Mod: S$GLB,,, | Performed by: NURSE PRACTITIONER

## 2022-01-18 PROCEDURE — 3288F PR FALLS RISK ASSESSMENT DOCUMENTED: ICD-10-PCS | Mod: CPTII,S$GLB,, | Performed by: NURSE PRACTITIONER

## 2022-01-18 PROCEDURE — 3074F PR MOST RECENT SYSTOLIC BLOOD PRESSURE < 130 MM HG: ICD-10-PCS | Mod: CPTII,S$GLB,, | Performed by: NURSE PRACTITIONER

## 2022-01-18 PROCEDURE — 99999 PR PBB SHADOW E&M-EST. PATIENT-LVL IV: ICD-10-PCS | Mod: PBBFAC,,, | Performed by: NURSE PRACTITIONER

## 2022-01-18 PROCEDURE — 3008F PR BODY MASS INDEX (BMI) DOCUMENTED: ICD-10-PCS | Mod: CPTII,S$GLB,, | Performed by: NURSE PRACTITIONER

## 2022-01-18 PROCEDURE — 1160F RVW MEDS BY RX/DR IN RCRD: CPT | Mod: CPTII,S$GLB,, | Performed by: NURSE PRACTITIONER

## 2022-01-18 PROCEDURE — 1159F PR MEDICATION LIST DOCUMENTED IN MEDICAL RECORD: ICD-10-PCS | Mod: CPTII,S$GLB,, | Performed by: NURSE PRACTITIONER

## 2022-01-18 PROCEDURE — 3078F PR MOST RECENT DIASTOLIC BLOOD PRESSURE < 80 MM HG: ICD-10-PCS | Mod: CPTII,S$GLB,, | Performed by: NURSE PRACTITIONER

## 2022-01-18 PROCEDURE — 3078F DIAST BP <80 MM HG: CPT | Mod: CPTII,S$GLB,, | Performed by: NURSE PRACTITIONER

## 2022-01-18 PROCEDURE — 1126F PR PAIN SEVERITY QUANTIFIED, NO PAIN PRESENT: ICD-10-PCS | Mod: CPTII,S$GLB,, | Performed by: NURSE PRACTITIONER

## 2022-01-18 PROCEDURE — 99999 PR PBB SHADOW E&M-EST. PATIENT-LVL IV: CPT | Mod: PBBFAC,,, | Performed by: NURSE PRACTITIONER

## 2022-01-18 PROCEDURE — 1126F AMNT PAIN NOTED NONE PRSNT: CPT | Mod: CPTII,S$GLB,, | Performed by: NURSE PRACTITIONER

## 2022-01-18 PROCEDURE — 3288F FALL RISK ASSESSMENT DOCD: CPT | Mod: CPTII,S$GLB,, | Performed by: NURSE PRACTITIONER

## 2022-01-18 PROCEDURE — 1101F PR PT FALLS ASSESS DOC 0-1 FALLS W/OUT INJ PAST YR: ICD-10-PCS | Mod: CPTII,S$GLB,, | Performed by: NURSE PRACTITIONER

## 2022-01-18 PROCEDURE — 3008F BODY MASS INDEX DOCD: CPT | Mod: CPTII,S$GLB,, | Performed by: NURSE PRACTITIONER

## 2022-01-18 PROCEDURE — 3074F SYST BP LT 130 MM HG: CPT | Mod: CPTII,S$GLB,, | Performed by: NURSE PRACTITIONER

## 2022-01-18 PROCEDURE — 99214 OFFICE O/P EST MOD 30 MIN: CPT | Mod: S$GLB,,, | Performed by: NURSE PRACTITIONER

## 2022-01-18 PROCEDURE — 1160F PR REVIEW ALL MEDS BY PRESCRIBER/CLIN PHARMACIST DOCUMENTED: ICD-10-PCS | Mod: CPTII,S$GLB,, | Performed by: NURSE PRACTITIONER

## 2022-01-18 PROCEDURE — 1101F PT FALLS ASSESS-DOCD LE1/YR: CPT | Mod: CPTII,S$GLB,, | Performed by: NURSE PRACTITIONER

## 2022-01-18 PROCEDURE — 1159F MED LIST DOCD IN RCRD: CPT | Mod: CPTII,S$GLB,, | Performed by: NURSE PRACTITIONER

## 2022-01-18 RX ORDER — LOTEPREDNOL ETABONATE 2.5 MG/ML
SUSPENSION/ DROPS OPHTHALMIC
COMMUNITY
Start: 2021-11-23 | End: 2022-06-03

## 2022-01-18 NOTE — PROGRESS NOTES
" Subjective:      Keyona Herrera is a 66 y.o. female who presents to the office today for a preoperative consultation at the request of surgeon Dr. Pereira who plans on performing Bilateral Cataract surgery on January 27, 2022. This consultation is requested for the specific conditions prompting preoperative evaluation (i.e. because of potential affect on operative risk): N/A. Planned anesthesia: local. The patient has the following known anesthesia issues: None. Patients bleeding risk: no recent abnormal bleeding, no remote history of abnormal bleeding and no use of Ca-channel blockers. Patient does not have objections to receiving blood products if needed.    The following portions of the patient's history were reviewed and updated as appropriate: allergies, current medications, past family history, past medical history, past social history, past surgical history and problem list.    Review of Systems    Constitutional: negative  Respiratory: negative  Cardiovascular: negative  Gastrointestinal: negative  Musculoskeletal:negative      Objective:      /72   Pulse 79   Temp 98.2 °F (36.8 °C) (Oral)   Ht 5' 3" (1.6 m)   Wt 101.7 kg (224 lb 3.3 oz)   SpO2 97%   BMI 39.72 kg/m²     General Appearance:    Alert, cooperative, no distress, appears stated age   Head:    Normocephalic, without obvious abnormality, atraumatic   Eyes:    PERRL, conjunctiva/corneas clear, EOM's intact, fundi     benign, both eyes        Ears:    Normal TM's and external ear canals, both ears   Nose:   Nares normal, septum midline, mucosa normal, no drainage    or sinus tenderness   Throat:   Lips, mucosa, and tongue normal; teeth and gums normal   Neck:   Supple, symmetrical, trachea midline, no adenopathy;        thyroid:  No enlargement/tenderness/nodules; no carotid    bruit or JVD   Back:     Symmetric, no curvature, ROM normal, no CVA tenderness   Lungs:     Clear to auscultation bilaterally, respirations unlabored   Chest " wall:    No tenderness or deformity   Heart:    Regular rate and rhythm, S1 and S2 normal, no murmur, rub   or gallop   Abdomen:     Soft, non-tender, bowel sounds active all four quadrants,     no masses, no organomegaly   Genitalia:    Normal male without lesion, discharge or tenderness   Rectal:    Normal tone, normal prostate, no masses or tenderness;    guaiac negative stool   Extremities:   Extremities normal, atraumatic, no cyanosis or edema   Pulses:   2+ and symmetric all extremities   Skin:   Skin color, texture, turgor normal, no rashes or lesions   Lymph nodes:   Cervical, supraclavicular, and axillary nodes normal   Neurologic:   CNII-XII intact. Normal strength, sensation and reflexes       throughout       Predictors of intubation difficulty:   Morbid obesity? yes    Anatomically abnormal facies? no   Prominent incisors? yes    Receding mandible? no   Short, thick neck? yes    Neck range of motion: normal   Mallampati score: I (soft palate, uvula, fauces, and tonsillar pillars visible)   Thyromental distance: < 6cm   Mouth opening: Normal    Dentition: Missing teeth (.)    Cardiographics  ECG: Not done   Echocardiogram: not done    Imaging  Chest x-ray: normal and Not done      Lab Review   Lab Results   Component Value Date     01/19/2022    K 4.1 01/19/2022     01/19/2022    CO2 27 01/19/2022    BUN 16 01/19/2022    CREATININE 0.7 01/19/2022    CALCIUM 10.7 (H) 01/19/2022     Lab Results   Component Value Date    WBC 8.80 01/19/2022    HGB 14.2 01/19/2022    HCT 45.1 01/19/2022    MCV 91 01/19/2022     01/19/2022         Assessment:        66 y.o. female with planned surgery as above.    Known risk factors for perioperative complications: None    Difficulty with intubation is not anticipated.    Cardiac Risk Estimation: The 10-year ASCVD risk score (Columbus DC Jr., et al., 2013) is: 6%    Values used to calculate the score:      Age: 66 years      Sex: Female      Is Non-  : No      Diabetic: No      Tobacco smoker: No      Systolic Blood Pressure: 128 mmHg      Is BP treated: No      HDL Cholesterol: 53 mg/dL      Total Cholesterol: 222 mg/dL      Current medications which may produce withdrawal symptoms if withheld perioperatively:  N/A     Plan:      1. Preoperative workup as follows CBC, CMP  2. Patient is clear for surgery

## 2022-01-18 NOTE — LETTER
January 18, 2022      [facility]  [address] [facility]  [place] [address]  Phone: [phone]  Fax: [phone]       Patient: Keyona Herrera   YOB: 1955  Date of Visit: 01/18/2022    To Whom It May Concern:    Khalif Herrera  was at [facility] on 01/18/2022. She has labs to be drawn the morning of 01/19/2022 that may cause her to be a tardy for work. If you have any questions or concerns, or if I can be of further assistance, please do not hesitate to contact me.    Sincerely,    Trent Dan, NP

## 2022-01-19 ENCOUNTER — LAB VISIT (OUTPATIENT)
Dept: LAB | Facility: HOSPITAL | Age: 67
End: 2022-01-19
Attending: NURSE PRACTITIONER
Payer: COMMERCIAL

## 2022-01-19 DIAGNOSIS — E78.5 HYPERLIPIDEMIA, UNSPECIFIED HYPERLIPIDEMIA TYPE: ICD-10-CM

## 2022-01-19 DIAGNOSIS — E66.9 CLASS 2 OBESITY WITH BODY MASS INDEX (BMI) OF 38.0 TO 38.9 IN ADULT, UNSPECIFIED OBESITY TYPE, UNSPECIFIED WHETHER SERIOUS COMORBIDITY PRESENT: ICD-10-CM

## 2022-01-19 LAB
ALBUMIN SERPL BCP-MCNC: 3.9 G/DL (ref 3.5–5.2)
ALP SERPL-CCNC: 105 U/L (ref 55–135)
ALT SERPL W/O P-5'-P-CCNC: 22 U/L (ref 10–44)
ANION GAP SERPL CALC-SCNC: 12 MMOL/L (ref 8–16)
AST SERPL-CCNC: 21 U/L (ref 10–40)
BASOPHILS # BLD AUTO: 0.04 K/UL (ref 0–0.2)
BASOPHILS NFR BLD: 0.5 % (ref 0–1.9)
BILIRUB SERPL-MCNC: 0.4 MG/DL (ref 0.1–1)
BUN SERPL-MCNC: 16 MG/DL (ref 8–23)
CALCIUM SERPL-MCNC: 10.7 MG/DL (ref 8.7–10.5)
CHLORIDE SERPL-SCNC: 101 MMOL/L (ref 95–110)
CO2 SERPL-SCNC: 27 MMOL/L (ref 23–29)
CREAT SERPL-MCNC: 0.7 MG/DL (ref 0.5–1.4)
DIFFERENTIAL METHOD: ABNORMAL
EOSINOPHIL # BLD AUTO: 0.1 K/UL (ref 0–0.5)
EOSINOPHIL NFR BLD: 1.6 % (ref 0–8)
ERYTHROCYTE [DISTWIDTH] IN BLOOD BY AUTOMATED COUNT: 13.6 % (ref 11.5–14.5)
EST. GFR  (AFRICAN AMERICAN): >60 ML/MIN/1.73 M^2
EST. GFR  (NON AFRICAN AMERICAN): >60 ML/MIN/1.73 M^2
GLUCOSE SERPL-MCNC: 102 MG/DL (ref 70–110)
HCT VFR BLD AUTO: 45.1 % (ref 37–48.5)
HGB BLD-MCNC: 14.2 G/DL (ref 12–16)
IMM GRANULOCYTES # BLD AUTO: 0.02 K/UL (ref 0–0.04)
IMM GRANULOCYTES NFR BLD AUTO: 0.2 % (ref 0–0.5)
LYMPHOCYTES # BLD AUTO: 3 K/UL (ref 1–4.8)
LYMPHOCYTES NFR BLD: 34.4 % (ref 18–48)
MCH RBC QN AUTO: 28.6 PG (ref 27–31)
MCHC RBC AUTO-ENTMCNC: 31.5 G/DL (ref 32–36)
MCV RBC AUTO: 91 FL (ref 82–98)
MONOCYTES # BLD AUTO: 0.6 K/UL (ref 0.3–1)
MONOCYTES NFR BLD: 7.2 % (ref 4–15)
NEUTROPHILS # BLD AUTO: 4.9 K/UL (ref 1.8–7.7)
NEUTROPHILS NFR BLD: 56.1 % (ref 38–73)
NRBC BLD-RTO: 0 /100 WBC
PLATELET # BLD AUTO: 209 K/UL (ref 150–450)
PMV BLD AUTO: 12.4 FL (ref 9.2–12.9)
POTASSIUM SERPL-SCNC: 4.1 MMOL/L (ref 3.5–5.1)
PROT SERPL-MCNC: 7.5 G/DL (ref 6–8.4)
RBC # BLD AUTO: 4.96 M/UL (ref 4–5.4)
SODIUM SERPL-SCNC: 140 MMOL/L (ref 136–145)
WBC # BLD AUTO: 8.8 K/UL (ref 3.9–12.7)

## 2022-01-19 PROCEDURE — 80053 COMPREHEN METABOLIC PANEL: CPT | Performed by: NURSE PRACTITIONER

## 2022-01-19 PROCEDURE — 85025 COMPLETE CBC W/AUTO DIFF WBC: CPT | Performed by: NURSE PRACTITIONER

## 2022-01-19 PROCEDURE — 36415 COLL VENOUS BLD VENIPUNCTURE: CPT | Mod: PO | Performed by: NURSE PRACTITIONER

## 2022-05-05 ENCOUNTER — TELEPHONE (OUTPATIENT)
Dept: FAMILY MEDICINE | Facility: CLINIC | Age: 67
End: 2022-05-05
Payer: COMMERCIAL

## 2022-05-05 DIAGNOSIS — D23.20: Primary | ICD-10-CM

## 2022-05-05 NOTE — TELEPHONE ENCOUNTER
----- Message from Aleisha Praful sent at 5/5/2022 12:28 PM CDT -----  Type: Patient Call Back    Who called:pt    What is the request in detail:pt requesting to get referral for dermatologist for growth behind ear. Call pt    Can the clinic reply by [facility]?    Would the patient rather a call back or a response via [facility]? call    Best call back number:[phone] (home)       Additional Information:

## 2022-05-12 ENCOUNTER — PATIENT MESSAGE (OUTPATIENT)
Dept: ADMINISTRATIVE | Facility: OTHER | Age: 67
End: 2022-05-12
Payer: COMMERCIAL

## 2022-05-12 ENCOUNTER — PATIENT OUTREACH (OUTPATIENT)
Dept: ADMINISTRATIVE | Facility: OTHER | Age: 67
End: 2022-05-12
Payer: COMMERCIAL

## 2022-05-12 DIAGNOSIS — Z12.31 ENCOUNTER FOR SCREENING MAMMOGRAM FOR MALIGNANT NEOPLASM OF BREAST: Primary | ICD-10-CM

## 2022-05-13 ENCOUNTER — OFFICE VISIT (OUTPATIENT)
Dept: DERMATOLOGY | Facility: CLINIC | Age: 67
End: 2022-05-13
Payer: COMMERCIAL

## 2022-05-13 VITALS — WEIGHT: 224 LBS | BODY MASS INDEX: 39.68 KG/M2

## 2022-05-13 DIAGNOSIS — L82.1 SEBORRHEIC KERATOSES: Primary | ICD-10-CM

## 2022-05-13 DIAGNOSIS — D23.20: ICD-10-CM

## 2022-05-13 PROCEDURE — 1160F RVW MEDS BY RX/DR IN RCRD: CPT | Mod: CPTII,S$GLB,, | Performed by: DERMATOLOGY

## 2022-05-13 PROCEDURE — 3008F BODY MASS INDEX DOCD: CPT | Mod: CPTII,S$GLB,, | Performed by: DERMATOLOGY

## 2022-05-13 PROCEDURE — 1159F PR MEDICATION LIST DOCUMENTED IN MEDICAL RECORD: ICD-10-PCS | Mod: CPTII,S$GLB,, | Performed by: DERMATOLOGY

## 2022-05-13 PROCEDURE — 1126F AMNT PAIN NOTED NONE PRSNT: CPT | Mod: CPTII,S$GLB,, | Performed by: DERMATOLOGY

## 2022-05-13 PROCEDURE — 3288F FALL RISK ASSESSMENT DOCD: CPT | Mod: CPTII,S$GLB,, | Performed by: DERMATOLOGY

## 2022-05-13 PROCEDURE — 3288F PR FALLS RISK ASSESSMENT DOCUMENTED: ICD-10-PCS | Mod: CPTII,S$GLB,, | Performed by: DERMATOLOGY

## 2022-05-13 PROCEDURE — 17110 PR DESTRUCTION BENIGN LESIONS UP TO 14: ICD-10-PCS | Mod: S$GLB,,, | Performed by: DERMATOLOGY

## 2022-05-13 PROCEDURE — 1126F PR PAIN SEVERITY QUANTIFIED, NO PAIN PRESENT: ICD-10-PCS | Mod: CPTII,S$GLB,, | Performed by: DERMATOLOGY

## 2022-05-13 PROCEDURE — 1101F PT FALLS ASSESS-DOCD LE1/YR: CPT | Mod: CPTII,S$GLB,, | Performed by: DERMATOLOGY

## 2022-05-13 PROCEDURE — 1159F MED LIST DOCD IN RCRD: CPT | Mod: CPTII,S$GLB,, | Performed by: DERMATOLOGY

## 2022-05-13 PROCEDURE — 99999 PR PBB SHADOW E&M-EST. PATIENT-LVL III: ICD-10-PCS | Mod: PBBFAC,,, | Performed by: DERMATOLOGY

## 2022-05-13 PROCEDURE — 99203 OFFICE O/P NEW LOW 30 MIN: CPT | Mod: 25,S$GLB,, | Performed by: DERMATOLOGY

## 2022-05-13 PROCEDURE — 1160F PR REVIEW ALL MEDS BY PRESCRIBER/CLIN PHARMACIST DOCUMENTED: ICD-10-PCS | Mod: CPTII,S$GLB,, | Performed by: DERMATOLOGY

## 2022-05-13 PROCEDURE — 17110 DESTRUCTION B9 LES UP TO 14: CPT | Mod: S$GLB,,, | Performed by: DERMATOLOGY

## 2022-05-13 PROCEDURE — 3008F PR BODY MASS INDEX (BMI) DOCUMENTED: ICD-10-PCS | Mod: CPTII,S$GLB,, | Performed by: DERMATOLOGY

## 2022-05-13 PROCEDURE — 99203 PR OFFICE/OUTPT VISIT, NEW, LEVL III, 30-44 MIN: ICD-10-PCS | Mod: 25,S$GLB,, | Performed by: DERMATOLOGY

## 2022-05-13 PROCEDURE — 99999 PR PBB SHADOW E&M-EST. PATIENT-LVL III: CPT | Mod: PBBFAC,,, | Performed by: DERMATOLOGY

## 2022-05-13 PROCEDURE — 1101F PR PT FALLS ASSESS DOC 0-1 FALLS W/OUT INJ PAST YR: ICD-10-PCS | Mod: CPTII,S$GLB,, | Performed by: DERMATOLOGY

## 2022-05-13 NOTE — PROGRESS NOTES
Care Everywhere: updated  Immunization: updated  Health Maintenance: updated  Media Review: review for outside mammogram and colon cancer report   Legacy Review:   DIS:no updated mammogram report in DIS   Order placed: mammogram   Upcoming appts:  EFAX:   Task Tickets:Mammogram scheduling ticket sent to patient's portal   Referrals:

## 2022-05-13 NOTE — PROGRESS NOTES
Subjective:       Patient ID:  Keyona Herrera is a 66 y.o. female who presents for   Chief Complaint   Patient presents with    Lesion     Behind right ear, raised, several months and face      She noticed a spot behind her ear a few months ago would sue to have it checked.  Also spots on face for years.      Review of Systems   Constitutional: Positive for night sweats. Negative for fever, chills, weight loss, weight gain, fatigue and malaise.   Skin: Positive for daily sunscreen use. Negative for wears hat.   Hematologic/Lymphatic: Does not bruise/bleed easily.        Objective:    Physical Exam   Constitutional: She appears well-developed and well-nourished.   Neurological: She is alert and oriented to person, place, and time.   Psychiatric: She has a normal mood and affect.   Skin:   Areas Examined (abnormalities noted in diagram):   Head / Face Inspection Performed                  Diagram Legend     Erythematous scaling macule/papule c/w actinic keratosis       Vascular papule c/w angioma      Pigmented verrucoid papule/plaque c/w seborrheic keratosis      Yellow umbilicated papule c/w sebaceous hyperplasia      Irregularly shaped tan macule c/w lentigo     1-2 mm smooth white papules consistent with Milia      Movable subcutaneous cyst with punctum c/w epidermal inclusion cyst      Subcutaneous movable cyst c/w pilar cyst      Firm pink to brown papule c/w dermatofibroma      Pedunculated fleshy papule(s) c/w skin tag(s)      Evenly pigmented macule c/w junctional nevus     Mildly variegated pigmented, slightly irregular-bordered macule c/w mildly atypical nevus      Flesh colored to evenly pigmented papule c/w intradermal nevus       Pink pearly papule/plaque c/w basal cell carcinoma      Erythematous hyperkeratotic cursted plaque c/w SCC      Surgical scar with no sign of skin cancer recurrence      Open and closed comedones      Inflammatory papules and pustules      Verrucoid papule consistent  consistent with wart     Erythematous eczematous patches and plaques     Dystrophic onycholytic nail with subungual debris c/w onychomycosis     Umbilicated papule    Erythematous-base heme-crusted tan verrucoid plaque consistent with inflamed seborrheic keratosis     Erythematous Silvery Scaling Plaque c/w Psoriasis     See annotation      Assessment / Plan:        Seborrheic keratoses  Cryosurgery procedure note:    Verbal consent from the patient is obtained including, but not limited to, risk of hypopigmentation/hyperpigmentation, scar, recurrence of lesion. Liquid nitrogen cryosurgery is applied to 1 lesion post right ear to produce a freeze injury,and is instructed to call if lesion does not completely resolve.  Dermatosis papulosa nigra face  Brochure provided  sk s  Benign neoplasm of skin of ear, unspecified laterality  -     Ambulatory referral/consult to Dermatology             Follow up in about 1 year (around 5/13/2023).

## 2022-06-03 ENCOUNTER — OFFICE VISIT (OUTPATIENT)
Dept: FAMILY MEDICINE | Facility: CLINIC | Age: 67
End: 2022-06-03
Payer: COMMERCIAL

## 2022-06-03 VITALS
OXYGEN SATURATION: 96 % | WEIGHT: 228.81 LBS | BODY MASS INDEX: 40.54 KG/M2 | TEMPERATURE: 99 F | HEART RATE: 79 BPM | HEIGHT: 63 IN | SYSTOLIC BLOOD PRESSURE: 140 MMHG | DIASTOLIC BLOOD PRESSURE: 90 MMHG

## 2022-06-03 DIAGNOSIS — Z23 NEED FOR TDAP VACCINATION: ICD-10-CM

## 2022-06-03 DIAGNOSIS — Z00.00 ANNUAL PHYSICAL EXAM: Primary | ICD-10-CM

## 2022-06-03 DIAGNOSIS — Z12.11 SCREENING FOR MALIGNANT NEOPLASM OF COLON: ICD-10-CM

## 2022-06-03 DIAGNOSIS — I10 ESSENTIAL HYPERTENSION: ICD-10-CM

## 2022-06-03 DIAGNOSIS — E78.5 HYPERLIPIDEMIA, UNSPECIFIED HYPERLIPIDEMIA TYPE: ICD-10-CM

## 2022-06-03 DIAGNOSIS — M25.511 ARTHRALGIA OF RIGHT SHOULDER REGION: ICD-10-CM

## 2022-06-03 PROCEDURE — 3077F SYST BP >= 140 MM HG: CPT | Mod: CPTII,S$GLB,, | Performed by: FAMILY MEDICINE

## 2022-06-03 PROCEDURE — 1160F PR REVIEW ALL MEDS BY PRESCRIBER/CLIN PHARMACIST DOCUMENTED: ICD-10-PCS | Mod: CPTII,S$GLB,, | Performed by: FAMILY MEDICINE

## 2022-06-03 PROCEDURE — 99397 PER PM REEVAL EST PAT 65+ YR: CPT | Mod: 25,S$GLB,, | Performed by: FAMILY MEDICINE

## 2022-06-03 PROCEDURE — 90715 TDAP VACCINE 7 YRS/> IM: CPT | Mod: S$GLB,,, | Performed by: FAMILY MEDICINE

## 2022-06-03 PROCEDURE — 3288F FALL RISK ASSESSMENT DOCD: CPT | Mod: CPTII,S$GLB,, | Performed by: FAMILY MEDICINE

## 2022-06-03 PROCEDURE — 90471 TDAP VACCINE GREATER THAN OR EQUAL TO 7YO IM: ICD-10-PCS | Mod: S$GLB,,, | Performed by: FAMILY MEDICINE

## 2022-06-03 PROCEDURE — 3080F DIAST BP >= 90 MM HG: CPT | Mod: CPTII,S$GLB,, | Performed by: FAMILY MEDICINE

## 2022-06-03 PROCEDURE — 99999 PR PBB SHADOW E&M-EST. PATIENT-LVL IV: CPT | Mod: PBBFAC,,, | Performed by: FAMILY MEDICINE

## 2022-06-03 PROCEDURE — 3008F PR BODY MASS INDEX (BMI) DOCUMENTED: ICD-10-PCS | Mod: CPTII,S$GLB,, | Performed by: FAMILY MEDICINE

## 2022-06-03 PROCEDURE — 3288F PR FALLS RISK ASSESSMENT DOCUMENTED: ICD-10-PCS | Mod: CPTII,S$GLB,, | Performed by: FAMILY MEDICINE

## 2022-06-03 PROCEDURE — 1160F RVW MEDS BY RX/DR IN RCRD: CPT | Mod: CPTII,S$GLB,, | Performed by: FAMILY MEDICINE

## 2022-06-03 PROCEDURE — 90715 TDAP VACCINE GREATER THAN OR EQUAL TO 7YO IM: ICD-10-PCS | Mod: S$GLB,,, | Performed by: FAMILY MEDICINE

## 2022-06-03 PROCEDURE — 1159F MED LIST DOCD IN RCRD: CPT | Mod: CPTII,S$GLB,, | Performed by: FAMILY MEDICINE

## 2022-06-03 PROCEDURE — 1126F PR PAIN SEVERITY QUANTIFIED, NO PAIN PRESENT: ICD-10-PCS | Mod: CPTII,S$GLB,, | Performed by: FAMILY MEDICINE

## 2022-06-03 PROCEDURE — 1101F PR PT FALLS ASSESS DOC 0-1 FALLS W/OUT INJ PAST YR: ICD-10-PCS | Mod: CPTII,S$GLB,, | Performed by: FAMILY MEDICINE

## 2022-06-03 PROCEDURE — 1101F PT FALLS ASSESS-DOCD LE1/YR: CPT | Mod: CPTII,S$GLB,, | Performed by: FAMILY MEDICINE

## 2022-06-03 PROCEDURE — 1126F AMNT PAIN NOTED NONE PRSNT: CPT | Mod: CPTII,S$GLB,, | Performed by: FAMILY MEDICINE

## 2022-06-03 PROCEDURE — 1159F PR MEDICATION LIST DOCUMENTED IN MEDICAL RECORD: ICD-10-PCS | Mod: CPTII,S$GLB,, | Performed by: FAMILY MEDICINE

## 2022-06-03 PROCEDURE — 3080F PR MOST RECENT DIASTOLIC BLOOD PRESSURE >= 90 MM HG: ICD-10-PCS | Mod: CPTII,S$GLB,, | Performed by: FAMILY MEDICINE

## 2022-06-03 PROCEDURE — 90471 IMMUNIZATION ADMIN: CPT | Mod: S$GLB,,, | Performed by: FAMILY MEDICINE

## 2022-06-03 PROCEDURE — 3008F BODY MASS INDEX DOCD: CPT | Mod: CPTII,S$GLB,, | Performed by: FAMILY MEDICINE

## 2022-06-03 PROCEDURE — 99999 PR PBB SHADOW E&M-EST. PATIENT-LVL IV: ICD-10-PCS | Mod: PBBFAC,,, | Performed by: FAMILY MEDICINE

## 2022-06-03 PROCEDURE — 3077F PR MOST RECENT SYSTOLIC BLOOD PRESSURE >= 140 MM HG: ICD-10-PCS | Mod: CPTII,S$GLB,, | Performed by: FAMILY MEDICINE

## 2022-06-03 PROCEDURE — 99397 PR PREVENTIVE VISIT,EST,65 & OVER: ICD-10-PCS | Mod: 25,S$GLB,, | Performed by: FAMILY MEDICINE

## 2022-06-03 RX ORDER — HYDROCHLOROTHIAZIDE 25 MG/1
25 TABLET ORAL DAILY
Qty: 30 TABLET | Refills: 11 | Status: SHIPPED | OUTPATIENT
Start: 2022-06-03 | End: 2023-07-05

## 2022-06-03 NOTE — PROGRESS NOTES
"Routine Office Visit    Keyona Herrera  1955  [id number]      Subjective     Keyona is a 66 y.o. female who presents today for:    1. Annual physical  2. Right shoulder pain - started 1 month ago. Patient had fall in December 2021. Patient describes pain as a pulling pain with certain movements. Pain is intermittent. Patient is right handed. No lifting injuries. Patient has sedentary job.   3. Hypertension - blood pressure is elevated today; Patient admits she has gained weight. She states it is difficult for her to exercise regularly and has difficulty with weight loss. She does plan to make efforts to make lifestyle modification to improve her weight, increase exercise and improve her health     Objective     Review of Systems   Constitutional: Negative for chills and fever.   HENT: Negative for congestion.    Eyes: Negative for blurred vision.   Respiratory: Negative for cough.    Cardiovascular: Negative for chest pain.   Gastrointestinal: Negative for abdominal pain, constipation, diarrhea, heartburn, nausea and vomiting.   Genitourinary: Negative for dysuria.   Musculoskeletal: Negative for myalgias.   Skin: Negative for itching and rash.   Neurological: Negative for dizziness and headaches.   Psychiatric/Behavioral: Negative for depression.       BP (!) 140/90 (BP Location: Left arm, Patient Position: Sitting, BP Method: Large (Manual))   Pulse 79   Temp 98.5 °F (36.9 °C) (Oral)   Ht 5' 3" (1.6 m)   Wt 103.8 kg (228 lb 13.4 oz)   SpO2 96%   BMI 40.54 kg/m²   Physical Exam  Constitutional:       Appearance: She is well-developed.   HENT:      Head: Normocephalic and atraumatic.   Eyes:      Conjunctiva/sclera: Conjunctivae normal.      Pupils: Pupils are equal, round, and reactive to light.   Cardiovascular:      Rate and Rhythm: Normal rate and regular rhythm.      Heart sounds: Normal heart sounds. No murmur heard.    No friction rub. No gallop.   Pulmonary:      Effort: No respiratory " distress.      Breath sounds: Normal breath sounds.   Abdominal:      General: Bowel sounds are normal. There is no distension.      Palpations: Abdomen is soft.      Tenderness: There is no abdominal tenderness.   Musculoskeletal:         General: Normal range of motion.      Cervical back: Normal range of motion and neck supple.   Lymphadenopathy:      Cervical: No cervical adenopathy.   Skin:     General: Skin is warm.   Neurological:      Mental Status: She is alert and oriented to person, place, and time.           Assessment     Problem List Items Addressed This Visit        Cardiac/Vascular    Essential hypertension    Relevant Medications    hydroCHLOROthiazide (HYDRODIURIL) 25 MG tablet  bp check in 2 weeks  The patient is asked to make an attempt to improve diet and exercise patterns to aid in medical management of this problem.  Recommend lifestyle modification       Hyperlipidemia  The patient is asked to make an attempt to improve diet and exercise patterns to aid in medical management of this problem.         Orthopedic    Arthralgia of right shoulder region  Recommend home Physical Therapy  Recommend exercises - handout given   Consider referral for worsening symptoms         Other Visit Diagnoses     Annual physical exam    -  Primary    Relevant Orders    CBC Auto Differential    Comprehensive Metabolic Panel    Lipid Panel    Hemoglobin A1C    TSH    Hepatitis C Antibody    HIV 1/2 Ag/Ab (4th Gen)  Health Maintenance       Date Due Completion Date    Hepatitis C Screening Never done ---    HIV Screening Never done ---    Shingles Vaccine (1 of 2) Never done ---    Colorectal Cancer Screening 04/01/2021 3/3/2021    COVID-19 Vaccine (3 - Booster for Pfizer series) 08/25/2021 3/25/2021    Pneumococcal Vaccines (Age 65+) (1 - PCV) Never done ---    Mammogram 03/03/2022 3/3/2021    Influenza Vaccine (Season Ended) 09/01/2022 ---    DEXA Scan 03/03/2024 3/3/2021    Lipid Panel 07/29/2026 7/29/2021     TETANUS VACCINE 06/03/2032 6/3/2022        I addressed all major concerns as it related to health maintenance.  All were ordered and scheduled based on the patients wishes.  Any additional health maintenance will be readdressed at the next physical if declined or deferred by the patient.      Need for Tdap vaccination        Relevant Orders    Tdap Vaccine (Completed)    Screening for malignant neoplasm of colon        Relevant Orders    Case Request Endoscopy: COLONOSCOPY (Completed)          Follow up in about 1 year (around 6/3/2023), or if symptoms worsen or fail to improve, for yearly exam.

## 2022-06-11 ENCOUNTER — LAB VISIT (OUTPATIENT)
Dept: LAB | Facility: HOSPITAL | Age: 67
End: 2022-06-11
Attending: FAMILY MEDICINE
Payer: COMMERCIAL

## 2022-06-11 DIAGNOSIS — Z00.00 ANNUAL PHYSICAL EXAM: ICD-10-CM

## 2022-06-11 LAB
ALBUMIN SERPL BCP-MCNC: 3.8 G/DL (ref 3.5–5.2)
ALP SERPL-CCNC: 107 U/L (ref 55–135)
ALT SERPL W/O P-5'-P-CCNC: 22 U/L (ref 10–44)
ANION GAP SERPL CALC-SCNC: 13 MMOL/L (ref 8–16)
AST SERPL-CCNC: 20 U/L (ref 10–40)
BASOPHILS # BLD AUTO: 0.03 K/UL (ref 0–0.2)
BASOPHILS NFR BLD: 0.4 % (ref 0–1.9)
BILIRUB SERPL-MCNC: 0.6 MG/DL (ref 0.1–1)
BUN SERPL-MCNC: 13 MG/DL (ref 8–23)
CALCIUM SERPL-MCNC: 10.4 MG/DL (ref 8.7–10.5)
CHLORIDE SERPL-SCNC: 99 MMOL/L (ref 95–110)
CHOLEST SERPL-MCNC: 240 MG/DL (ref 120–199)
CHOLEST/HDLC SERPL: 5.7 {RATIO} (ref 2–5)
CO2 SERPL-SCNC: 24 MMOL/L (ref 23–29)
CREAT SERPL-MCNC: 0.8 MG/DL (ref 0.5–1.4)
DIFFERENTIAL METHOD: ABNORMAL
EOSINOPHIL # BLD AUTO: 0.1 K/UL (ref 0–0.5)
EOSINOPHIL NFR BLD: 1.6 % (ref 0–8)
ERYTHROCYTE [DISTWIDTH] IN BLOOD BY AUTOMATED COUNT: 14.1 % (ref 11.5–14.5)
EST. GFR  (AFRICAN AMERICAN): >60 ML/MIN/1.73 M^2
EST. GFR  (NON AFRICAN AMERICAN): >60 ML/MIN/1.73 M^2
ESTIMATED AVG GLUCOSE: 157 MG/DL (ref 68–131)
GLUCOSE SERPL-MCNC: 135 MG/DL (ref 70–110)
HBA1C MFR BLD: 7.1 % (ref 4–5.6)
HCT VFR BLD AUTO: 46.4 % (ref 37–48.5)
HDLC SERPL-MCNC: 42 MG/DL (ref 40–75)
HDLC SERPL: 17.5 % (ref 20–50)
HGB BLD-MCNC: 14.6 G/DL (ref 12–16)
IMM GRANULOCYTES # BLD AUTO: 0.03 K/UL (ref 0–0.04)
IMM GRANULOCYTES NFR BLD AUTO: 0.4 % (ref 0–0.5)
LDLC SERPL CALC-MCNC: 150.2 MG/DL (ref 63–159)
LYMPHOCYTES # BLD AUTO: 2.7 K/UL (ref 1–4.8)
LYMPHOCYTES NFR BLD: 33.5 % (ref 18–48)
MCH RBC QN AUTO: 28.2 PG (ref 27–31)
MCHC RBC AUTO-ENTMCNC: 31.5 G/DL (ref 32–36)
MCV RBC AUTO: 90 FL (ref 82–98)
MONOCYTES # BLD AUTO: 0.4 K/UL (ref 0.3–1)
MONOCYTES NFR BLD: 5.3 % (ref 4–15)
NEUTROPHILS # BLD AUTO: 4.8 K/UL (ref 1.8–7.7)
NEUTROPHILS NFR BLD: 58.8 % (ref 38–73)
NONHDLC SERPL-MCNC: 198 MG/DL
NRBC BLD-RTO: 0 /100 WBC
PLATELET # BLD AUTO: 198 K/UL (ref 150–450)
PMV BLD AUTO: 12.1 FL (ref 9.2–12.9)
POTASSIUM SERPL-SCNC: 4.2 MMOL/L (ref 3.5–5.1)
PROT SERPL-MCNC: 7.6 G/DL (ref 6–8.4)
RBC # BLD AUTO: 5.18 M/UL (ref 4–5.4)
SODIUM SERPL-SCNC: 136 MMOL/L (ref 136–145)
TRIGL SERPL-MCNC: 239 MG/DL (ref 30–150)
TSH SERPL DL<=0.005 MIU/L-ACNC: 2.17 UIU/ML (ref 0.4–4)
WBC # BLD AUTO: 8.18 K/UL (ref 3.9–12.7)

## 2022-06-11 PROCEDURE — 36415 COLL VENOUS BLD VENIPUNCTURE: CPT | Mod: PO | Performed by: FAMILY MEDICINE

## 2022-06-11 PROCEDURE — 84443 ASSAY THYROID STIM HORMONE: CPT | Performed by: FAMILY MEDICINE

## 2022-06-11 PROCEDURE — 85025 COMPLETE CBC W/AUTO DIFF WBC: CPT | Performed by: FAMILY MEDICINE

## 2022-06-11 PROCEDURE — 87389 HIV-1 AG W/HIV-1&-2 AB AG IA: CPT | Performed by: FAMILY MEDICINE

## 2022-06-11 PROCEDURE — 80061 LIPID PANEL: CPT | Performed by: FAMILY MEDICINE

## 2022-06-11 PROCEDURE — 83036 HEMOGLOBIN GLYCOSYLATED A1C: CPT | Performed by: FAMILY MEDICINE

## 2022-06-11 PROCEDURE — 80053 COMPREHEN METABOLIC PANEL: CPT | Performed by: FAMILY MEDICINE

## 2022-06-13 DIAGNOSIS — I10 ESSENTIAL HYPERTENSION: Primary | ICD-10-CM

## 2022-06-13 LAB — HIV 1+2 AB+HIV1 P24 AG SERPL QL IA: NEGATIVE

## 2022-06-16 ENCOUNTER — PATIENT OUTREACH (OUTPATIENT)
Dept: ADMINISTRATIVE | Facility: HOSPITAL | Age: 67
End: 2022-06-16
Payer: COMMERCIAL

## 2022-06-17 ENCOUNTER — CLINICAL SUPPORT (OUTPATIENT)
Dept: FAMILY MEDICINE | Facility: CLINIC | Age: 67
End: 2022-06-17
Payer: COMMERCIAL

## 2022-06-17 VITALS — HEART RATE: 75 BPM | OXYGEN SATURATION: 97 % | DIASTOLIC BLOOD PRESSURE: 82 MMHG | SYSTOLIC BLOOD PRESSURE: 136 MMHG

## 2022-06-17 DIAGNOSIS — I10 ESSENTIAL HYPERTENSION: Primary | ICD-10-CM

## 2022-06-17 PROCEDURE — 99999 PR PBB SHADOW E&M-EST. PATIENT-LVL II: ICD-10-PCS | Mod: PBBFAC,,,

## 2022-06-17 PROCEDURE — 99499 UNLISTED E&M SERVICE: CPT | Mod: S$GLB,,, | Performed by: FAMILY MEDICINE

## 2022-06-17 PROCEDURE — 99499 NO LOS: ICD-10-PCS | Mod: S$GLB,,, | Performed by: FAMILY MEDICINE

## 2022-06-17 PROCEDURE — 99999 PR PBB SHADOW E&M-EST. PATIENT-LVL II: CPT | Mod: PBBFAC,,,

## 2022-06-17 NOTE — PROGRESS NOTES
Keyona Herrera 66 y.o. female is here today for Blood Pressure check.   History of HTN no.    Review of patient's allergies indicates:  No Known Allergies  Creatinine   Date Value Ref Range Status   06/11/2022 0.8 0.5 - 1.4 mg/dL Final     Sodium   Date Value Ref Range Status   06/11/2022 136 136 - 145 mmol/L Final     Potassium   Date Value Ref Range Status   06/11/2022 4.2 3.5 - 5.1 mmol/L Final   ]  Patient verifies taking blood pressure medications on a regular basis at the same time of the day.     Current Outpatient Medications:     aspirin (ECOTRIN) 325 MG EC tablet, Take 1 tablet (325 mg total) by mouth 2 (two) times daily., Disp: 60 tablet, Rfl: 0    coenzyme Q10 100 mg capsule, Take 100 mg by mouth once daily., Disp: , Rfl:     DM/acetaminophen/doxylamine (CORICIDIN HBP COLD-MULTI SYMPT ORAL), Take by mouth., Disp: , Rfl:     flaxseed oil 1,000 mg Cap, Take by mouth. 1 Capsule Oral Every day, Disp: , Rfl:     fluticasone (FLONASE) 50 mcg/actuation nasal spray, 1 spray (50 mcg total) by Each Nare route once daily. (Patient not taking: Reported on 1/18/2022), Disp: 1 Bottle, Rfl: 0    hydroCHLOROthiazide (HYDRODIURIL) 25 MG tablet, Take 1 tablet (25 mg total) by mouth once daily., Disp: 30 tablet, Rfl: 11    meclizine (ANTIVERT) 25 mg tablet, Take 1 tablet (25 mg total) by mouth 3 (three) times daily as needed for Dizziness. (Patient not taking: Reported on 6/3/2022), Disp: 60 tablet, Rfl: 1    multivitamin capsule, Take by mouth. 1 Capsule Oral Every day, Disp: , Rfl:     omega-3 fatty acids 1,000 mg Cap, Take by mouth. 1 Capsule Oral Every day, Disp: , Rfl:   Does patient have record of home blood pressure readings no. Readings have been averaging na.   Last dose of blood pressure medication was taken at 6/17/22 12 PM.  Patient is asymptomatic.   Complains of none.    Vitals:    06/17/22 1525   BP: 136/82   BP Location: Left arm   Patient Position: Sitting   BP Method: Large (Manual)    Pulse: 75   SpO2: 97%         Dr. Palomo informed of nurse visit.

## 2022-06-24 LAB — BCS RECOMMENDATION EXT: NORMAL

## 2022-08-03 LAB
CHOL/HDLC RATIO: 4.6
CHOLEST SERPL-MSCNC: 239 MG/DL (ref 0–200)
HDLC SERPL-MCNC: 52 MG/DL
LDLC SERPL CALC-MCNC: 135 MG/DL (ref 0–160)
TRIGL SERPL-MCNC: 265 MG/DL
VLDL CHOLESTEROL: 53 MG/DL

## 2022-08-09 ENCOUNTER — PATIENT OUTREACH (OUTPATIENT)
Dept: ADMINISTRATIVE | Facility: HOSPITAL | Age: 67
End: 2022-08-09
Payer: COMMERCIAL

## 2022-08-17 DIAGNOSIS — Z11.59 NEED FOR HEPATITIS C SCREENING TEST: ICD-10-CM

## 2022-10-03 ENCOUNTER — TELEPHONE (OUTPATIENT)
Dept: FAMILY MEDICINE | Facility: CLINIC | Age: 67
End: 2022-10-03
Payer: COMMERCIAL

## 2022-10-03 DIAGNOSIS — Z12.11 SCREENING FOR MALIGNANT NEOPLASM OF COLON: Primary | ICD-10-CM

## 2022-10-03 NOTE — TELEPHONE ENCOUNTER
----- Message from Eduardo English sent at 10/3/2022 11:39 AM CDT -----  Name of Who is Calling: KIN TOMPKINS [[id number]]           What is the request in detail:Patient is requesting a call back to schedule a colonoscopy.  Please assist.           Can the clinic reply by [facility]: No           What Number to Call Back if not in [facility]: [phone]

## 2022-10-27 ENCOUNTER — CLINICAL SUPPORT (OUTPATIENT)
Dept: ENDOSCOPY | Facility: HOSPITAL | Age: 67
End: 2022-10-27
Attending: FAMILY MEDICINE
Payer: COMMERCIAL

## 2022-10-27 VITALS — HEIGHT: 63 IN | BODY MASS INDEX: 37.21 KG/M2 | WEIGHT: 210 LBS

## 2022-10-27 DIAGNOSIS — Z12.11 SCREENING FOR MALIGNANT NEOPLASM OF COLON: ICD-10-CM

## 2022-10-27 RX ORDER — POLYETHYLENE GLYCOL 3350, SODIUM SULFATE ANHYDROUS, SODIUM BICARBONATE, SODIUM CHLORIDE, POTASSIUM CHLORIDE 236; 22.74; 6.74; 5.86; 2.97 G/4L; G/4L; G/4L; G/4L; G/4L
4 POWDER, FOR SOLUTION ORAL ONCE
Qty: 4000 ML | Refills: 0 | Status: SHIPPED | OUTPATIENT
Start: 2022-10-27 | End: 2022-10-27

## 2022-11-14 ENCOUNTER — HOSPITAL ENCOUNTER (OUTPATIENT)
Facility: HOSPITAL | Age: 67
Discharge: HOME | End: 2022-11-14
Attending: INTERNAL MEDICINE | Admitting: INTERNAL MEDICINE
Payer: COMMERCIAL

## 2022-11-14 ENCOUNTER — ANESTHESIA (OUTPATIENT)
Dept: ENDOSCOPY | Facility: HOSPITAL | Age: 67
End: 2022-11-14
Payer: COMMERCIAL

## 2022-11-14 ENCOUNTER — ANESTHESIA EVENT (OUTPATIENT)
Dept: ENDOSCOPY | Facility: HOSPITAL | Age: 67
End: 2022-11-14
Payer: COMMERCIAL

## 2022-11-14 VITALS
RESPIRATION RATE: 18 BRPM | DIASTOLIC BLOOD PRESSURE: 62 MMHG | SYSTOLIC BLOOD PRESSURE: 135 MMHG | TEMPERATURE: 98 F | OXYGEN SATURATION: 97 % | HEART RATE: 68 BPM

## 2022-11-14 DIAGNOSIS — Z86.010 HISTORY OF COLON POLYPS: ICD-10-CM

## 2022-11-14 PROCEDURE — D9220A PRA ANESTHESIA: ICD-10-PCS | Mod: 33,ANES,, | Performed by: ANESTHESIOLOGY

## 2022-11-14 PROCEDURE — 63600175 PHARM REV CODE 636 W HCPCS: Performed by: NURSE ANESTHETIST, CERTIFIED REGISTERED

## 2022-11-14 PROCEDURE — 37000008 HC ANESTHESIA 1ST 15 MINUTES: Performed by: INTERNAL MEDICINE

## 2022-11-14 PROCEDURE — 27201012 HC FORCEPS, HOT/COLD, DISP: Performed by: INTERNAL MEDICINE

## 2022-11-14 PROCEDURE — 27201089 HC SNARE, DISP (ANY): Performed by: INTERNAL MEDICINE

## 2022-11-14 PROCEDURE — 37000009 HC ANESTHESIA EA ADD 15 MINS: Performed by: INTERNAL MEDICINE

## 2022-11-14 PROCEDURE — 88305 TISSUE EXAM BY PATHOLOGIST: CPT | Performed by: PATHOLOGY

## 2022-11-14 PROCEDURE — D9220A PRA ANESTHESIA: Mod: 33,ANES,, | Performed by: ANESTHESIOLOGY

## 2022-11-14 PROCEDURE — 45385 COLONOSCOPY W/LESION REMOVAL: CPT | Mod: PT | Performed by: INTERNAL MEDICINE

## 2022-11-14 PROCEDURE — 25000003 PHARM REV CODE 250: Performed by: NURSE ANESTHETIST, CERTIFIED REGISTERED

## 2022-11-14 PROCEDURE — 45385 COLONOSCOPY W/LESION REMOVAL: CPT | Mod: 33,,, | Performed by: INTERNAL MEDICINE

## 2022-11-14 PROCEDURE — 45380 COLONOSCOPY AND BIOPSY: CPT | Mod: 33,59,, | Performed by: INTERNAL MEDICINE

## 2022-11-14 PROCEDURE — 45380 COLONOSCOPY AND BIOPSY: CPT | Mod: PT,59 | Performed by: INTERNAL MEDICINE

## 2022-11-14 PROCEDURE — 25000003 PHARM REV CODE 250: Performed by: INTERNAL MEDICINE

## 2022-11-14 PROCEDURE — 88305 TISSUE EXAM BY PATHOLOGIST: CPT | Mod: 26,,, | Performed by: PATHOLOGY

## 2022-11-14 PROCEDURE — D9220A PRA ANESTHESIA: Mod: 33,CRNA,, | Performed by: NURSE ANESTHETIST, CERTIFIED REGISTERED

## 2022-11-14 PROCEDURE — 45380 PR COLONOSCOPY,BIOPSY: ICD-10-PCS | Mod: 33,59,, | Performed by: INTERNAL MEDICINE

## 2022-11-14 PROCEDURE — 27200997: Performed by: INTERNAL MEDICINE

## 2022-11-14 PROCEDURE — 88305 TISSUE EXAM BY PATHOLOGIST: ICD-10-PCS | Mod: 26,,, | Performed by: PATHOLOGY

## 2022-11-14 PROCEDURE — 45385 PR COLONOSCOPY,REMV LESN,SNARE: ICD-10-PCS | Mod: 33,,, | Performed by: INTERNAL MEDICINE

## 2022-11-14 PROCEDURE — D9220A PRA ANESTHESIA: ICD-10-PCS | Mod: 33,CRNA,, | Performed by: NURSE ANESTHETIST, CERTIFIED REGISTERED

## 2022-11-14 RX ORDER — PROPOFOL 10 MG/ML
VIAL (ML) INTRAVENOUS
Status: DISCONTINUED | OUTPATIENT
Start: 2022-11-14 | End: 2022-11-14

## 2022-11-14 RX ORDER — PROPOFOL 10 MG/ML
INJECTION, EMULSION INTRAVENOUS
Status: DISCONTINUED
Start: 2022-11-14 | End: 2022-11-14 | Stop reason: HOSPADM

## 2022-11-14 RX ORDER — LIDOCAINE HYDROCHLORIDE 20 MG/ML
INJECTION INTRAVENOUS
Status: DISCONTINUED | OUTPATIENT
Start: 2022-11-14 | End: 2022-11-14

## 2022-11-14 RX ORDER — SODIUM CHLORIDE 9 MG/ML
INJECTION, SOLUTION INTRAVENOUS CONTINUOUS
Status: DISCONTINUED | OUTPATIENT
Start: 2022-11-14 | End: 2022-11-14 | Stop reason: HOSPADM

## 2022-11-14 RX ORDER — LIDOCAINE HYDROCHLORIDE 20 MG/ML
INJECTION, SOLUTION EPIDURAL; INFILTRATION; INTRACAUDAL; PERINEURAL
Status: DISCONTINUED
Start: 2022-11-14 | End: 2022-11-14 | Stop reason: HOSPADM

## 2022-11-14 RX ADMIN — PROPOFOL 50 MG: 10 INJECTION, EMULSION INTRAVENOUS at 01:11

## 2022-11-14 RX ADMIN — LIDOCAINE HYDROCHLORIDE 100 MG: 20 INJECTION, SOLUTION INTRAVENOUS at 01:11

## 2022-11-14 RX ADMIN — SODIUM CHLORIDE: 0.9 INJECTION, SOLUTION INTRAVENOUS at 01:11

## 2022-11-14 RX ADMIN — PROPOFOL 100 MG: 10 INJECTION, EMULSION INTRAVENOUS at 01:11

## 2022-11-14 NOTE — TRANSFER OF CARE
Anesthesia Transfer of Care Note    Patient: Keyona Herrera    Procedure(s) Performed: Procedure(s) (LRB):  COLONOSCOPY (N/A)    Patient location: GI    Anesthesia Type: general    Transport from OR: Transported from OR on room air with adequate spontaneous ventilation    Post pain: adequate analgesia    Post assessment: no apparent anesthetic complications    Post vital signs: stable    Level of consciousness: sedated and responds to stimulation    Nausea/Vomiting: no nausea/vomiting    Complications: none    Transfer of care protocol was followed      Last vitals:   Visit Vitals  BP (!) 149/66 (Patient Position: Lying)   Pulse 75   Temp 36.4 °C (97.5 °F) (Oral)   Resp (!) 26   SpO2 96%   Breastfeeding No

## 2022-11-14 NOTE — PROVATION PATIENT INSTRUCTIONS
Discharge Summary/Instructions after an Endoscopic Procedure  Patient Name: Keyona Herrera  Patient MRN: [id number]  Patient YOB: 1955 Monday, November 14, 2022  Judy Hernandez MD  Dear patient,  As a result of recent federal legislation (The Federal Cures Act), you may   receive lab or pathology results from your procedure in your [facility]   account before your physician is able to contact you. Your physician or   their representative will relay the results to you with their   recommendations at their soonest availability.  Thank you,  RESTRICTIONS:  During your procedure today, you received medications for sedation.  These   medications may affect your judgment, balance and coordination.  Therefore,   for 24 hours, you have the following restrictions:   - DO NOT drive a car, operate machinery, make legal/financial decisions,   sign important papers or drink alcohol.    ACTIVITY:  Today: no heavy lifting, straining or running due to procedural   sedation/anesthesia.  The following day: return to full activity including work.  DIET:  Eat and drink normally unless instructed otherwise.     TREATMENT FOR COMMON SIDE EFFECTS:  - Mild abdominal pain, nausea, belching, bloating or excessive gas:  rest,   eat lightly and use a heating pad.  - Sore Throat: treat with throat lozenges and/or gargle with warm salt   water.  - Because air was used during the procedure, expelling large amounts of air   from your rectum or belching is normal.  - If a bowel prep was taken, you may not have a bowel movement for 1-3 days.    This is normal.  SYMPTOMS TO WATCH FOR AND REPORT TO YOUR PHYSICIAN:  1. Abdominal pain or bloating, other than gas cramps.  2. Chest pain.  3. Back pain.  4. Signs of infection such as: chills or fever occurring within 24 hours   after the procedure.  5. Rectal bleeding, which would show as bright red, maroon, or black stools.   (A tablespoon of blood from the rectum is not serious, especially  if   hemorrhoids are present.)  6. Vomiting.  7. Weakness or dizziness.  GO DIRECTLY TO THE NEAREST EMERGENCY ROOM IF YOU HAVE ANY OF THE FOLLOWING:      Difficulty breathing              Chills and/or fever over 101 F   Persistent vomiting and/or vomiting blood   Severe abdominal pain   Severe chest pain   Black, tarry stools   Bleeding- more than one tablespoon   Any other symptom or condition that you feel may need urgent attention  Your doctor recommends these additional instructions:  If any biopsies were taken, your doctors clinic will contact you in 1 to 2   weeks with any results.  - Discharge patient to home.   - Resume previous diet.   - Continue present medications.   - Await pathology results.   - Addendum: Repeat colonoscopy in 5 years for surveillance based on   pathology results.  For questions, problems or results please call your physician - Judy Hernandez MD at Work:  [phone].  [facility] can be reached at [phone]     IF A COMPLICATION OR EMERGENCY SITUATION ARISES AND YOU ARE UNABLE TO REACH   YOUR PHYSICIAN - GO DIRECTLY TO THE EMERGENCY ROOM.  Judy Hernandez MD  11/14/2022 2:04:53 PM  This report has been verified and signed electronically.  Dear patient,  As a result of recent federal legislation (The Federal Cures Act), you may   receive lab or pathology results from your procedure in your [facility]   account before your physician is able to contact you. Your physician or   their representative will relay the results to you with their   recommendations at their soonest availability.  Thank you,  PROVATION

## 2022-11-14 NOTE — H&P
Short Stay Endoscopy History and Physical    PCP - Cher Palomo MD    Procedure - Colonoscopy  ASA - per anesthesia  Mallampati - per anesthesia  History of Anesthesia problems - no  Family history Anesthesia problems - no   Plan of anesthesia - General    HPI:  This is a 67 y.o. female here for evaluation of : personal history of colon polyps      ROS:  Constitutional: No fevers, chills, No weight loss  CV: No chest pain  Pulm: No cough, No shortness of breath  GI: see HPI  Derm: No rash    Medical History:  has a past medical history of HTN (hypertension), Hyperlipidemia, Obesity, and Personal history of colonic polyps ().    Surgical History:  has a past surgical history that includes Hysterectomy; Incisional breast biopsy; Cholecystectomy; surgery on foot; Colonoscopy (N/A, 2016);  section; and Total knee arthroplasty (Right, 3/4/2019).    Family History: family history includes Aneurysm in her father; Cancer in her maternal grandfather; Dementia in her mother; Hypertension in her unknown relative.. Otherwise no colon cancer, inflammatory bowel disease, or GI malignancies.    Social History:  reports that she has never smoked. She has never used smokeless tobacco. She reports current alcohol use. She reports that she does not use drugs.    Review of patient's allergies indicates:  No Known Allergies    Medications:   Medications Prior to Admission   Medication Sig Dispense Refill Last Dose    aspirin (ECOTRIN) 325 MG EC tablet Take 1 tablet (325 mg total) by mouth 2 (two) times daily. 60 tablet 0     coenzyme Q10 100 mg capsule Take 100 mg by mouth once daily.       DM/acetaminophen/doxylamine (CORICIDIN HBP COLD-MULTI SYMPT ORAL) Take by mouth.       flaxseed oil 1,000 mg Cap Take by mouth. 1 Capsule Oral Every day       fluticasone (FLONASE) 50 mcg/actuation nasal spray 1 spray (50 mcg total) by Each Nare route once daily. (Patient not taking: Reported on 2022) 1 Bottle 0      hydroCHLOROthiazide (HYDRODIURIL) 25 MG tablet Take 1 tablet (25 mg total) by mouth once daily. 30 tablet 11     meclizine (ANTIVERT) 25 mg tablet Take 1 tablet (25 mg total) by mouth 3 (three) times daily as needed for Dizziness. (Patient not taking: Reported on 6/3/2022) 60 tablet 1     multivitamin capsule Take by mouth. 1 Capsule Oral Every day       omega-3 fatty acids 1,000 mg Cap Take by mouth. 1 Capsule Oral Every day            Physical Exam:    Vital Signs: There were no vitals filed for this visit.    Gen: NAD, lying comfortably  HENT: NCAT, oropharynx clear  Eyes: anicteric sclerae, EOMI grossly  Neck: supple, no visible masses/goiter  Cardiac: RRR  Lungs: non-labored breathing  Abd: soft, NT/ND, normoactive BS  Ext: no LE edema, warm, well perfused  Skin: skin intact on exposed body parts, no visible rashes, lesions  Neuro: A&Ox4, neuro exam grossly intact, moves all extremities  Psych: appropriate mood, affect        Labs:  Lab Results   Component Value Date    WBC 8.18 06/11/2022    HGB 14.6 06/11/2022    HCT 46.4 06/11/2022     06/11/2022    CHOL 239 (A) 08/03/2022    TRIG 265 (A) 08/03/2022    HDL 52 08/03/2022    ALT 22 06/11/2022    AST 20 06/11/2022     06/11/2022    K 4.2 06/11/2022    CL 99 06/11/2022    CREATININE 0.8 06/11/2022    BUN 13 06/11/2022    CO2 24 06/11/2022    TSH 2.169 06/11/2022    INR 1.0 02/20/2019    HGBA1C 7.1 (H) 06/11/2022       Plan:  Colonoscopy for surveillance due to a history of colon polyps.    I have explained the risks and benefits of endoscopy procedures to the patient including but not limited to bleeding, perforation, infection, and death.  The patient was asked if they understand and allowed to ask any further questions to their satisfaction.    Judy Hernandez MD

## 2022-11-17 LAB
FINAL PATHOLOGIC DIAGNOSIS: NORMAL
GROSS: NORMAL
Lab: NORMAL

## 2022-11-17 NOTE — ANESTHESIA PREPROCEDURE EVALUATION
11/17/2022  Keyona Herrera is a 67 y.o., female.      Pre-op Assessment     I have reviewed the Nursing Notes.       Review of Systems  Anesthesia Hx:  No problems with previous Anesthesia    Social:  Non-Smoker    Cardiovascular:   Denies Pacemaker. Hypertension  Denies CABG/stent.  hyperlipidemia    Pulmonary:  Pulmonary Normal    Renal/:  Renal/ Normal     Hepatic/GI:   Bowel Prep. Denies PUD. Denies Hiatal Hernia.  Denies GERD. Denies Liver Disease.  Denies Hepatitis.    Musculoskeletal:   Arthritis     Neurological:  Neurology Normal    Endocrine:   Denies Diabetes. Denies Hypothyroidism. Denies Hyperthyroidism.  Obesity / BMI > 30      Physical Exam  General: Well nourished, Cooperative, Alert and Oriented    Airway:  Mallampati: III   Mouth Opening: Normal  TM Distance: Normal  Tongue: Normal  Neck ROM: Normal ROM        Anesthesia Plan  Type of Anesthesia, risks & benefits discussed:    Anesthesia Type: Gen Natural Airway  Intra-op Monitoring Plan: Standard ASA Monitors  Induction:  IV  Informed Consent: Informed consent signed with the Patient and all parties understand the risks and agree with anesthesia plan.  All questions answered.   ASA Score: 2  Day of Surgery Review of History & Physical: H&P Update referred to the surgeon/provider.    Ready For Surgery From Anesthesia Perspective.     .

## 2022-11-17 NOTE — ANESTHESIA POSTPROCEDURE EVALUATION
Anesthesia Post Evaluation    Patient: Keyona Herrera    Procedure(s) Performed: Procedure(s) (LRB):  COLONOSCOPY (N/A)    Final Anesthesia Type: general      Patient location during evaluation: GI PACU  Patient participation: Yes- Able to Participate  Level of consciousness: awake and alert  Post-procedure vital signs: reviewed and stable  Pain management: adequate  Airway patency: patent    PONV status at discharge: No PONV  Anesthetic complications: no      Cardiovascular status: blood pressure returned to baseline and hemodynamically stable  Respiratory status: unassisted and spontaneous ventilation  Hydration status: euvolemic  Follow-up not needed.          Vitals Value Taken Time   /62 11/14/22 1425   Temp 36.4 °C (97.5 °F) 11/14/22 1356   Pulse 66 11/14/22 1428   Resp 27 11/14/22 1428   SpO2 99 % 11/14/22 1428   Vitals shown include unvalidated device data.      Event Time   Out of Recovery 14:25:00         Pain/Bell Score: No data recorded

## 2023-07-05 DIAGNOSIS — I10 ESSENTIAL HYPERTENSION: ICD-10-CM

## 2023-07-05 RX ORDER — HYDROCHLOROTHIAZIDE 25 MG/1
TABLET ORAL
Qty: 30 TABLET | Refills: 0 | Status: SHIPPED | OUTPATIENT
Start: 2023-07-05 | End: 2023-08-08

## 2023-07-05 NOTE — TELEPHONE ENCOUNTER
Refill Routing Note   Refill Routing Note   Medication(s) are not appropriate for processing by [facility] for the following reason(s):      Required labs outdated    [facility] action(s):  Defer Appointment due  Labs due        Medication reconciliation completed: No     Appointments  past 12m or future 3m with PCP    Date Provider   Last Visit   6/3/2022 Cher Palomo MD   Next Visit   Visit date not found Cher Palomo MD   ED visits in past 90 days: 0        Note composed:9:43 AM 07/05/2023

## 2023-07-05 NOTE — TELEPHONE ENCOUNTER
Care Due:                  Date            Visit Type   Department     Provider  --------------------------------------------------------------------------------                                EP [facility] FAMILY                              PRIMARY      MED/ INTERNAL  Last Visit: 06-      CARE ([facility])   MED/ PEDS      Cher Palomo  Next Visit: None Scheduled  None         None Found                                                            Last  Test          Frequency    Reason                     Performed    Due Date  --------------------------------------------------------------------------------    Office Visit  12 months..  hydroCHLOROthiazide......  06- 05-    CMP.........  12 months..  hydroCHLOROthiazide......  06- 06-    Health [facility] Embedded Care Due Messages. Reference number: [id number].   7/05/2023 4:11:08 AM CDT

## 2023-07-06 ENCOUNTER — TELEPHONE (OUTPATIENT)
Dept: FAMILY MEDICINE | Facility: CLINIC | Age: 68
End: 2023-07-06
Payer: COMMERCIAL

## 2023-07-06 NOTE — TELEPHONE ENCOUNTER
----- Message from Lynn Hyman sent at 7/6/2023 11:02 AM CDT -----  Regarding: Returning Call  .Type:  Patient Returning Call    Who Called: Self     Who Left Message for Patient: Tran    Does the patient know what this is regarding?: N/A    Would the patient rather a call back or a response via [facility]? Call back    Best Call Back Number: [phone]     Additional Information:

## 2023-07-06 NOTE — TELEPHONE ENCOUNTER
----- Message from Genaro Chavez sent at 7/6/2023 10:49 AM CDT -----  Regarding: Self [phone]  Type:  Sooner Appointment Request    Patient is requesting a sooner appointment.  Patient declined first available appointment listed as well as another facility and provider .  Patient will not accept being placed on the waitlist and is requesting a message be sent to doctor.    Name of Caller:  Self     When is the first available appointment?  February 2024    Symptoms:  annual    Would the patient rather a call back or a response via [email or web address]?  Call back     Best Call Back Number:  [phone]     Additional Information:

## 2023-07-12 DIAGNOSIS — I10 ESSENTIAL HYPERTENSION: ICD-10-CM

## 2023-07-13 ENCOUNTER — LAB VISIT (OUTPATIENT)
Dept: LAB | Facility: HOSPITAL | Age: 68
End: 2023-07-13
Attending: INTERNAL MEDICINE
Payer: COMMERCIAL

## 2023-07-13 ENCOUNTER — OFFICE VISIT (OUTPATIENT)
Dept: FAMILY MEDICINE | Facility: CLINIC | Age: 68
End: 2023-07-13
Payer: COMMERCIAL

## 2023-07-13 VITALS
HEIGHT: 63 IN | DIASTOLIC BLOOD PRESSURE: 80 MMHG | HEART RATE: 88 BPM | TEMPERATURE: 99 F | WEIGHT: 214.5 LBS | OXYGEN SATURATION: 95 % | BODY MASS INDEX: 38.01 KG/M2 | SYSTOLIC BLOOD PRESSURE: 138 MMHG

## 2023-07-13 DIAGNOSIS — M17.0 PRIMARY OSTEOARTHRITIS OF BOTH KNEES: ICD-10-CM

## 2023-07-13 DIAGNOSIS — Z12.31 SCREENING MAMMOGRAM, ENCOUNTER FOR: ICD-10-CM

## 2023-07-13 DIAGNOSIS — Z00.00 ANNUAL PHYSICAL EXAM: ICD-10-CM

## 2023-07-13 DIAGNOSIS — Z00.00 ANNUAL PHYSICAL EXAM: Primary | ICD-10-CM

## 2023-07-13 DIAGNOSIS — E66.01 SEVERE OBESITY (BMI 35.0-39.9) WITH COMORBIDITY: ICD-10-CM

## 2023-07-13 DIAGNOSIS — E11.9 TYPE 2 DIABETES MELLITUS WITHOUT COMPLICATION, WITHOUT LONG-TERM CURRENT USE OF INSULIN: ICD-10-CM

## 2023-07-13 DIAGNOSIS — Z23 NEED FOR SHINGLES VACCINE: ICD-10-CM

## 2023-07-13 DIAGNOSIS — F51.12 INSUFFICIENT SLEEP SYNDROME: ICD-10-CM

## 2023-07-13 DIAGNOSIS — Z23 NEED FOR PNEUMOCOCCAL VACCINATION: ICD-10-CM

## 2023-07-13 DIAGNOSIS — I10 ESSENTIAL HYPERTENSION: ICD-10-CM

## 2023-07-13 DIAGNOSIS — R01.1 SYSTOLIC MURMUR: ICD-10-CM

## 2023-07-13 DIAGNOSIS — E78.5 DYSLIPIDEMIA: Primary | ICD-10-CM

## 2023-07-13 LAB
ALBUMIN SERPL BCP-MCNC: 3.9 G/DL (ref 3.5–5.2)
ALP SERPL-CCNC: 112 U/L (ref 55–135)
ALT SERPL W/O P-5'-P-CCNC: 29 U/L (ref 10–44)
ANION GAP SERPL CALC-SCNC: 13 MMOL/L (ref 8–16)
AST SERPL-CCNC: 25 U/L (ref 10–40)
BASOPHILS # BLD AUTO: 0.04 K/UL (ref 0–0.2)
BASOPHILS NFR BLD: 0.4 % (ref 0–1.9)
BILIRUB SERPL-MCNC: 0.7 MG/DL (ref 0.1–1)
BUN SERPL-MCNC: 12 MG/DL (ref 8–23)
CALCIUM SERPL-MCNC: 10.1 MG/DL (ref 8.7–10.5)
CHLORIDE SERPL-SCNC: 97 MMOL/L (ref 95–110)
CHOLEST SERPL-MCNC: 259 MG/DL (ref 120–199)
CHOLEST/HDLC SERPL: 6.2 {RATIO} (ref 2–5)
CO2 SERPL-SCNC: 28 MMOL/L (ref 23–29)
CREAT SERPL-MCNC: 0.8 MG/DL (ref 0.5–1.4)
DIFFERENTIAL METHOD: ABNORMAL
EOSINOPHIL # BLD AUTO: 0.1 K/UL (ref 0–0.5)
EOSINOPHIL NFR BLD: 1 % (ref 0–8)
ERYTHROCYTE [DISTWIDTH] IN BLOOD BY AUTOMATED COUNT: 13.9 % (ref 11.5–14.5)
EST. GFR  (NO RACE VARIABLE): >60 ML/MIN/1.73 M^2
ESTIMATED AVG GLUCOSE: 186 MG/DL (ref 68–131)
GLUCOSE SERPL-MCNC: 186 MG/DL (ref 70–110)
HBA1C MFR BLD: 8.1 % (ref 4–5.6)
HCT VFR BLD AUTO: 47.5 % (ref 37–48.5)
HDLC SERPL-MCNC: 42 MG/DL (ref 40–75)
HDLC SERPL: 16.2 % (ref 20–50)
HGB BLD-MCNC: 14.9 G/DL (ref 12–16)
IMM GRANULOCYTES # BLD AUTO: 0.05 K/UL (ref 0–0.04)
IMM GRANULOCYTES NFR BLD AUTO: 0.5 % (ref 0–0.5)
LDLC SERPL CALC-MCNC: 163.6 MG/DL (ref 63–159)
LYMPHOCYTES # BLD AUTO: 3.2 K/UL (ref 1–4.8)
LYMPHOCYTES NFR BLD: 32.5 % (ref 18–48)
MCH RBC QN AUTO: 28.5 PG (ref 27–31)
MCHC RBC AUTO-ENTMCNC: 31.4 G/DL (ref 32–36)
MCV RBC AUTO: 91 FL (ref 82–98)
MONOCYTES # BLD AUTO: 0.6 K/UL (ref 0.3–1)
MONOCYTES NFR BLD: 6 % (ref 4–15)
NEUTROPHILS # BLD AUTO: 5.8 K/UL (ref 1.8–7.7)
NEUTROPHILS NFR BLD: 59.6 % (ref 38–73)
NONHDLC SERPL-MCNC: 217 MG/DL
NRBC BLD-RTO: 0 /100 WBC
PLATELET # BLD AUTO: 215 K/UL (ref 150–450)
PMV BLD AUTO: 13.1 FL (ref 9.2–12.9)
POTASSIUM SERPL-SCNC: 4.1 MMOL/L (ref 3.5–5.1)
PROT SERPL-MCNC: 7.7 G/DL (ref 6–8.4)
RBC # BLD AUTO: 5.23 M/UL (ref 4–5.4)
SODIUM SERPL-SCNC: 138 MMOL/L (ref 136–145)
TRIGL SERPL-MCNC: 267 MG/DL (ref 30–150)
TSH SERPL DL<=0.005 MIU/L-ACNC: 3.29 UIU/ML (ref 0.4–4)
WBC # BLD AUTO: 9.77 K/UL (ref 3.9–12.7)

## 2023-07-13 PROCEDURE — 80053 COMPREHEN METABOLIC PANEL: CPT | Performed by: INTERNAL MEDICINE

## 2023-07-13 PROCEDURE — 90471 PR IMMUNIZ ADMIN,1 SINGLE/COMB VAC/TOXOID: ICD-10-PCS | Mod: S$GLB,,, | Performed by: INTERNAL MEDICINE

## 2023-07-13 PROCEDURE — 3288F PR FALLS RISK ASSESSMENT DOCUMENTED: ICD-10-PCS | Mod: CPTII,S$GLB,, | Performed by: INTERNAL MEDICINE

## 2023-07-13 PROCEDURE — 3008F PR BODY MASS INDEX (BMI) DOCUMENTED: ICD-10-PCS | Mod: CPTII,S$GLB,, | Performed by: INTERNAL MEDICINE

## 2023-07-13 PROCEDURE — 3079F DIAST BP 80-89 MM HG: CPT | Mod: CPTII,S$GLB,, | Performed by: INTERNAL MEDICINE

## 2023-07-13 PROCEDURE — 90677 PNEUMOCOCCAL CONJUGATE VACCINE 20-VALENT: ICD-10-PCS | Mod: S$GLB,,, | Performed by: INTERNAL MEDICINE

## 2023-07-13 PROCEDURE — 84443 ASSAY THYROID STIM HORMONE: CPT | Performed by: INTERNAL MEDICINE

## 2023-07-13 PROCEDURE — 36415 COLL VENOUS BLD VENIPUNCTURE: CPT | Mod: PO | Performed by: INTERNAL MEDICINE

## 2023-07-13 PROCEDURE — 85025 COMPLETE CBC W/AUTO DIFF WBC: CPT | Performed by: INTERNAL MEDICINE

## 2023-07-13 PROCEDURE — 99999 PR PBB SHADOW E&M-EST. PATIENT-LVL IV: CPT | Mod: PBBFAC,,, | Performed by: INTERNAL MEDICINE

## 2023-07-13 PROCEDURE — 99213 OFFICE O/P EST LOW 20 MIN: CPT | Mod: 25,S$GLB,, | Performed by: INTERNAL MEDICINE

## 2023-07-13 PROCEDURE — 3075F SYST BP GE 130 - 139MM HG: CPT | Mod: CPTII,S$GLB,, | Performed by: INTERNAL MEDICINE

## 2023-07-13 PROCEDURE — 99213 PR OFFICE/OUTPT VISIT, EST, LEVL III, 20-29 MIN: ICD-10-PCS | Mod: 25,S$GLB,, | Performed by: INTERNAL MEDICINE

## 2023-07-13 PROCEDURE — 1126F PR PAIN SEVERITY QUANTIFIED, NO PAIN PRESENT: ICD-10-PCS | Mod: CPTII,S$GLB,, | Performed by: INTERNAL MEDICINE

## 2023-07-13 PROCEDURE — 90471 IMMUNIZATION ADMIN: CPT | Mod: S$GLB,,, | Performed by: INTERNAL MEDICINE

## 2023-07-13 PROCEDURE — 3288F FALL RISK ASSESSMENT DOCD: CPT | Mod: CPTII,S$GLB,, | Performed by: INTERNAL MEDICINE

## 2023-07-13 PROCEDURE — 83036 HEMOGLOBIN GLYCOSYLATED A1C: CPT | Performed by: INTERNAL MEDICINE

## 2023-07-13 PROCEDURE — 1101F PR PT FALLS ASSESS DOC 0-1 FALLS W/OUT INJ PAST YR: ICD-10-PCS | Mod: CPTII,S$GLB,, | Performed by: INTERNAL MEDICINE

## 2023-07-13 PROCEDURE — 3075F PR MOST RECENT SYSTOLIC BLOOD PRESS GE 130-139MM HG: ICD-10-PCS | Mod: CPTII,S$GLB,, | Performed by: INTERNAL MEDICINE

## 2023-07-13 PROCEDURE — 90750 ZOSTER RECOMBINANT VACCINE: ICD-10-PCS | Mod: S$GLB,,, | Performed by: INTERNAL MEDICINE

## 2023-07-13 PROCEDURE — 3079F PR MOST RECENT DIASTOLIC BLOOD PRESSURE 80-89 MM HG: ICD-10-PCS | Mod: CPTII,S$GLB,, | Performed by: INTERNAL MEDICINE

## 2023-07-13 PROCEDURE — 90750 HZV VACC RECOMBINANT IM: CPT | Mod: S$GLB,,, | Performed by: INTERNAL MEDICINE

## 2023-07-13 PROCEDURE — 99397 PER PM REEVAL EST PAT 65+ YR: CPT | Mod: 25,S$GLB,, | Performed by: INTERNAL MEDICINE

## 2023-07-13 PROCEDURE — 90472 IMMUNIZATION ADMIN EACH ADD: CPT | Mod: S$GLB,,, | Performed by: INTERNAL MEDICINE

## 2023-07-13 PROCEDURE — 1126F AMNT PAIN NOTED NONE PRSNT: CPT | Mod: CPTII,S$GLB,, | Performed by: INTERNAL MEDICINE

## 2023-07-13 PROCEDURE — 1159F MED LIST DOCD IN RCRD: CPT | Mod: CPTII,S$GLB,, | Performed by: INTERNAL MEDICINE

## 2023-07-13 PROCEDURE — 99999 PR PBB SHADOW E&M-EST. PATIENT-LVL IV: ICD-10-PCS | Mod: PBBFAC,,, | Performed by: INTERNAL MEDICINE

## 2023-07-13 PROCEDURE — 99397 PR PREVENTIVE VISIT,EST,65 & OVER: ICD-10-PCS | Mod: 25,S$GLB,, | Performed by: INTERNAL MEDICINE

## 2023-07-13 PROCEDURE — 1159F PR MEDICATION LIST DOCUMENTED IN MEDICAL RECORD: ICD-10-PCS | Mod: CPTII,S$GLB,, | Performed by: INTERNAL MEDICINE

## 2023-07-13 PROCEDURE — 90472 ZOSTER RECOMBINANT VACCINE: ICD-10-PCS | Mod: S$GLB,,, | Performed by: INTERNAL MEDICINE

## 2023-07-13 PROCEDURE — 1101F PT FALLS ASSESS-DOCD LE1/YR: CPT | Mod: CPTII,S$GLB,, | Performed by: INTERNAL MEDICINE

## 2023-07-13 PROCEDURE — 90677 PCV20 VACCINE IM: CPT | Mod: S$GLB,,, | Performed by: INTERNAL MEDICINE

## 2023-07-13 PROCEDURE — 3008F BODY MASS INDEX DOCD: CPT | Mod: CPTII,S$GLB,, | Performed by: INTERNAL MEDICINE

## 2023-07-13 PROCEDURE — 80061 LIPID PANEL: CPT | Performed by: INTERNAL MEDICINE

## 2023-07-13 RX ORDER — METFORMIN HYDROCHLORIDE 500 MG/1
TABLET ORAL
Qty: 180 TABLET | Refills: 3 | Status: SHIPPED | OUTPATIENT
Start: 2023-07-13

## 2023-07-13 RX ORDER — ROSUVASTATIN CALCIUM 10 MG/1
10 TABLET, COATED ORAL DAILY
Qty: 90 TABLET | Refills: 3 | Status: SHIPPED | OUTPATIENT
Start: 2023-07-13 | End: 2024-07-12

## 2023-07-13 NOTE — PROGRESS NOTES
Patient given 1st dose shingrix & [id number] vaccines via injection. 0 complaints of, tolerated well. VIS given & advised to wait in lobby 15 mins for monitoring. Verbalized understanding.

## 2023-07-13 NOTE — PROGRESS NOTES
Chief Complaint  Chief Complaint   Patient presents with    Annual Exam       HPI  Keyona Herrera is a 67 y.o. female with multiple medical diagnoses as listed in the medical history and problem list that presents for annual exam.  Their last appointment with primary care was 06/03/2022.      She is generally feeling well physically. She has a good appetite and eats a variety of foods including plenty of vegetables. She does not currently have a dedicated exercise regimen but walks several times per week for shopping and does stretches and some knee and shoulder exercises in the mornings. 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 She has history of bilateral knee osteoarthritis, right knee s/p TKA as well as bilateral shoulder arthritis and rotator cuff instability of the left shoulder which occasionally bother her when trying to do more vigorous physical activity. She sleeps about 5 hours per night, feels rested at work but is usually quite fatigued when she gets home in the evening and might take a nap on the sofa without meaning to. She has no difficulty initiating sleep and does not often wake during the night, but states she and her  usually go to bed late and get up early. She states her  tells her she does snore at night. She has never been diagnosed with sleep apnea in the past. She takes a multivitamin, vitamin D, vitamin C, CoQ10, flax seed, omega 3 supplements. She takes Tylenol as needed a few times per month for aches and pains and uses a topical arthritis gel as needed for knee or wrist pain. She endorses some increased stressors recently due to cancer diagnoses in both of her brothers. She has good family support. Lives with her  and grandson. She is up to date on age-appropriate cancer screenings and is scheduled for [facility] on [facility]. She is due for pneumococcal and shingles vaccinations.     Review of chronic conditions:   - Hypertension:  She  is currently taking HCTZ only. She does not check her BP at home. They do have a wrist cuff in the home.   - At her last primary care appointment 13 months ago, she was noted to have dyslipidemia and elevated HbA1C of 7.1%. She has been conscientious about diet and lifestyle changes, she is not currently on medication for either of these conditions.       PAST MEDICAL HISTORY:  Past Medical History:   Diagnosis Date    HTN (hypertension)     Hyperlipidemia     Obesity     Personal history of colonic polyps        PAST SURGICAL HISTORY:  Past Surgical History:   Procedure Laterality Date     SECTION      2    CHOLECYSTECTOMY      COLONOSCOPY N/A 2016    Procedure: COLONOSCOPY;  Surgeon: Edy Chanel MD;  Location: [facility] ENDO;  Service: Endoscopy;  Laterality: N/A;    COLONOSCOPY N/A 2022    Procedure: COLONOSCOPY;  Surgeon: Judy Hernandez MD;  Location: [facility] ENDO;  Service: Endoscopy;  Laterality: N/A;  case request converted to ambulatory referral     instr portal    HYSTERECTOMY      INCISIONAL BREAST BIOPSY      surgery on foot      TOTAL KNEE ARTHROPLASTY Right 3/4/2019    Procedure: REPLACEMENT-KNEE-TOTAL;  Surgeon: [facility] Jr., MD;  Location: [address];  Service: Orthopedics;  Laterality: Right;       SOCIAL HISTORY:  Social History     Socioeconomic History    Marital status:    Tobacco Use    Smoking status: Never    Smokeless tobacco: Never   Substance and Sexual Activity    Alcohol use: Yes     Comment: socail drinker/ not often    Drug use: No    Sexual activity: Yes     Partners: Male       FAMILY HISTORY:  Family History   Problem Relation Age of Onset    Dementia Mother     Aneurysm Father         AAA    Hypertension Unknown     Cancer Maternal Grandfather         colon cancer - ? age of onset       ALLERGIES AND MEDICATIONS: updated and reviewed.  Review of patient's allergies indicates:  No Known Allergies  Current Outpatient Medications   Medication Sig  "Dispense Refill    coenzyme Q10 100 mg capsule Take 100 mg by mouth once daily.      flaxseed oil 1,000 mg Cap Take by mouth. 1 Capsule Oral Every day      hydroCHLOROthiazide (HYDRODIURIL) 25 MG tablet TAKE 1 TABLET(25 MG) BY MOUTH EVERY DAY 30 tablet 0    multivitamin capsule Take by mouth. 1 Capsule Oral Every day      omega-3 fatty acids 1,000 mg Cap Take by mouth. 1 Capsule Oral Every day      aspirin (ECOTRIN) 325 MG EC tablet Take 1 tablet (325 mg total) by mouth 2 (two) times daily. 60 tablet 0    DM/acetaminophen/doxylamine (CORICIDIN HBP COLD-MULTI SYMPT ORAL) Take by mouth.       No current facility-administered medications for this visit.         ROS  Review of Systems   Constitutional:  Positive for fatigue. Negative for chills and fever.   HENT:  Negative for congestion, rhinorrhea and sore throat.    Eyes:  Negative for visual disturbance.   Respiratory:  Negative for cough and shortness of breath.    Cardiovascular:  Negative for chest pain, palpitations and leg swelling.   Gastrointestinal:  Negative for abdominal pain, constipation and diarrhea.   Endocrine: Negative for polydipsia, polyphagia and polyuria.        Occasional heat intolerancce   Genitourinary:  Negative for difficulty urinating, dysuria and menstrual problem.   Musculoskeletal:  Positive for arthralgias. Negative for myalgias.   Skin:  Negative for color change and rash.   Neurological:  Negative for dizziness and headaches.        History of vertigo none currently    Hematological:  Does not bruise/bleed easily.   Psychiatric/Behavioral:  The patient is not nervous/anxious.          Physical Exam  Vitals:    07/13/23 0721 07/13/23 1155   BP: (!) 140/82 138/80   Pulse: 88    Temp: 98.5 °F (36.9 °C)    TempSrc: Oral    SpO2: 95%    Weight: 97.3 kg (214 lb 8.1 oz)    Height: 5' 3" (1.6 m)     Body mass index is 38 kg/m².  Weight: 97.3 kg (214 lb 8.1 oz)   Height: 5' 3" (160 cm)   Physical Exam  Vitals reviewed.   Constitutional:       " Appearance: Normal appearance. She is obese.   HENT:      Head: Normocephalic.      Right Ear: Tympanic membrane, ear canal and external ear normal.      Left Ear: Tympanic membrane, ear canal and external ear normal.      Nose: Nose normal.      Mouth/Throat:      Mouth: Mucous membranes are moist.      Comments: Thyroid enlargement  Eyes:      Extraocular Movements: Extraocular movements intact.   Cardiovascular:      Rate and Rhythm: Normal rate and regular rhythm.      Pulses: Normal pulses.      Heart sounds: Murmur heard.   Crescendo decrescendo systolic murmur is present with a grade of 3/6.   Pulmonary:      Effort: Pulmonary effort is normal.      Breath sounds: Normal breath sounds.   Abdominal:      General: Bowel sounds are normal.      Palpations: Abdomen is soft.      Tenderness: There is no abdominal tenderness.   Musculoskeletal:      Cervical back: Normal range of motion.      Right lower leg: No edema.      Left lower leg: No edema.   Skin:     General: Skin is warm and dry.   Neurological:      General: No focal deficit present.      Mental Status: She is alert.   Psychiatric:         Behavior: Behavior normal.         Health Maintenance         Date Due Completion Date    Hepatitis C Screening Never done ---    COVID-19 Vaccine (3 - Pfizer series) 05/20/2021 3/25/2021    Mammogram 06/24/2023 6/24/2022    Influenza Vaccine (1) 09/01/2023 ---    Shingles Vaccine (2 of 2) 09/07/2023 7/13/2023    DEXA Scan 03/03/2024 3/3/2021    Lipid Panel 08/03/2027 8/3/2022    Colorectal Cancer Screening 11/14/2027 11/14/2022    TETANUS VACCINE 06/03/2032 6/3/2022            Assessment and Plan: Keyona Herrera was seen today for routine physical. Separate E/M Code for acute conditions discussed during today's routine physical examination have been documented in the assessment and plan below.    Annual physical exam  Counseled on age appropriate medical preventative services, including age appropriate cancer  screenings, over all nutritional health, need for a consistent exercise regimen and an over all push towards maintaining a vigorous and active lifestyle.  Counseled on age appropriate vaccines and discussed upcoming health care needs based on age/gender. Discussed use of common OTC medications and supplements.  Discussed the need for good sleep hygiene and stress management.   -     CBC Auto Differential; Future; Expected date: 07/13/2023  -     Comprehensive Metabolic Panel; Future; Expected date: 07/13/2023  -     TSH; Future; Expected date: 07/13/2023    Severe obesity (BMI 35.0-39.9) with comorbidity  Discussed lifestyle modifications.     Essential hypertension  Initial BP in clinic 140/82, repeat 138/80. Encouraged home BP tracking. Currently taking HCTZ 25mg daily. Will monitor and adjust medication regimen as needed.     Type 2 diabetes mellitus without complication, without long-term current use of insulin  Last HbA1C 7.1%. She is not currently on antihyperglycemic medications. Repeat labs to inform management.    -     Microalbumin/Creatinine Ratio, Urine; Future; Expected date: 07/13/2023  -     Lipid Panel; Future; Expected date: 07/13/2023  -     Hemoglobin A1C; Future; Expected date: 07/13/2023    Insufficient sleep syndrome  Patient getting about 5 hours of sleep per night, feels rested in the mornings. Continue to monitor, discussed possibility for home sleep study to r/o CARMINE or other pathology.     Screening mammogram, encounter for  Patient reports she is scheduled for [facility] with [facility] on August 4.     Primary osteoarthritis of both knees  S/p right TKA, left knee mildly bothersome. Using topical diclofenac gel as needed. Continue to monitor.     Need for pneumococcal vaccination  Patient agrees to complete in office today.   -     Cancel: (In Office Administered) Pneumococcal Polysaccharide Vaccine (23 Valent) (SQ/IM)  -     (In Office Administered) Pneumococcal Conjugate Vaccine (20  Valent) (IM)    Need for shingles vaccine  Patient agrees to complete in office today.   -     (In Office Administered) Zoster Recombinant Vaccine    Systolic murmur  3/6 systolic murmur noted on exam. Last echocardiogram in 2014 without valvular pathology. Will order echo for further evaluation. Patient denies dyspnea, lightheadedness, or syncope.   -     Echo; Future      Luz Marinasa Guajardo, MS4      I hereby acknowledge that I am relying upon documentation authored by a medical student working under my supervision and further I hereby attest that I have verified the student documentation or findings by personally performing the physical exam and medical decision making activities of the Evaluation and Management service to be billed.    Jerzy Roca MD

## 2023-07-14 ENCOUNTER — TELEPHONE (OUTPATIENT)
Dept: FAMILY MEDICINE | Facility: CLINIC | Age: 68
End: 2023-07-14
Payer: COMMERCIAL

## 2023-07-14 NOTE — TELEPHONE ENCOUNTER
----- Message from Jerzy Roca MD sent at 7/13/2023  9:33 PM CDT -----  Please call the patient regarding the following results:    Diabetes is worsening (A1c 8.1%). I am starting METFORMIN to be taken daily with food to lower her blood sugars. Patient needs to exercise and improve diet as we discussed during clinic visit.     Patient's cholesterol is severely elevated. I am starting CRESTOR to be taken daily/every evening.    Schedule lab follow-up: Yes - 3 months   Schedule appointment: (after labs)

## 2023-07-14 NOTE — TELEPHONE ENCOUNTER
Call placed to patient related to MD message regarding lab results, new medication ordered and scheduling of follow ups. Appointments scheduled. Verbalized understanding and thank you.

## 2023-07-20 ENCOUNTER — PATIENT MESSAGE (OUTPATIENT)
Dept: FAMILY MEDICINE | Facility: CLINIC | Age: 68
End: 2023-07-20
Payer: COMMERCIAL

## 2023-08-07 DIAGNOSIS — I10 ESSENTIAL HYPERTENSION: ICD-10-CM

## 2023-08-07 NOTE — TELEPHONE ENCOUNTER
No care due was identified.  Health [facility] Embedded Care Due Messages. Reference number: [id number].   8/07/2023 4:09:08 AM CDT

## 2023-08-08 RX ORDER — HYDROCHLOROTHIAZIDE 25 MG/1
TABLET ORAL
Qty: 90 TABLET | Refills: 0 | Status: SHIPPED | OUTPATIENT
Start: 2023-08-08 | End: 2023-11-02

## 2023-08-08 NOTE — TELEPHONE ENCOUNTER
Refill Decision Note   Keyona Sharon  is requesting a refill authorization.  Brief Assessment and Rationale for Refill:  Approve     Medication Therapy Plan:         Comments:     Note composed:8:58 AM 08/08/2023

## 2023-08-24 DIAGNOSIS — E11.9 TYPE 2 DIABETES MELLITUS WITHOUT COMPLICATION, UNSPECIFIED WHETHER LONG TERM INSULIN USE: ICD-10-CM

## 2023-08-28 ENCOUNTER — PATIENT MESSAGE (OUTPATIENT)
Dept: ADMINISTRATIVE | Facility: HOSPITAL | Age: 68
End: 2023-08-28
Payer: COMMERCIAL

## 2023-09-12 ENCOUNTER — TELEPHONE (OUTPATIENT)
Dept: FAMILY MEDICINE | Facility: CLINIC | Age: 68
End: 2023-09-12
Payer: COMMERCIAL

## 2023-09-12 DIAGNOSIS — Z23 NEED FOR SHINGLES VACCINE: Primary | ICD-10-CM

## 2023-09-13 ENCOUNTER — CLINICAL SUPPORT (OUTPATIENT)
Dept: FAMILY MEDICINE | Facility: CLINIC | Age: 68
End: 2023-09-13
Payer: COMMERCIAL

## 2023-09-13 DIAGNOSIS — Z23 NEED FOR SHINGLES VACCINE: Primary | ICD-10-CM

## 2023-09-13 PROCEDURE — 90471 IMMUNIZATION ADMIN: CPT | Mod: S$GLB,,, | Performed by: FAMILY MEDICINE

## 2023-09-13 PROCEDURE — 90471 ZOSTER RECOMBINANT VACCINE: ICD-10-PCS | Mod: S$GLB,,, | Performed by: FAMILY MEDICINE

## 2023-09-13 PROCEDURE — 99999 PR PBB SHADOW E&M-EST. PATIENT-LVL I: ICD-10-PCS | Mod: PBBFAC,,,

## 2023-09-13 PROCEDURE — 90750 ZOSTER RECOMBINANT VACCINE: ICD-10-PCS | Mod: S$GLB,,, | Performed by: FAMILY MEDICINE

## 2023-09-13 PROCEDURE — 99999 PR PBB SHADOW E&M-EST. PATIENT-LVL I: CPT | Mod: PBBFAC,,,

## 2023-09-13 PROCEDURE — 90750 HZV VACC RECOMBINANT IM: CPT | Mod: S$GLB,,, | Performed by: FAMILY MEDICINE

## 2023-11-02 DIAGNOSIS — I10 ESSENTIAL HYPERTENSION: ICD-10-CM

## 2023-11-02 RX ORDER — HYDROCHLOROTHIAZIDE 25 MG/1
TABLET ORAL
Qty: 90 TABLET | Refills: 0 | Status: SHIPPED | OUTPATIENT
Start: 2023-11-02 | End: 2024-02-01

## 2023-11-02 NOTE — TELEPHONE ENCOUNTER
Care Due:                  Date            Visit Type   Department     Provider  --------------------------------------------------------------------------------                                EP [facility] FAMILY                              PRIMARY      MED/ INTERNAL  Last Visit: 07-      CARE ([facility])   MED/ PEDS      Jerzy Roca  Next Visit: None Scheduled  None         None Found                                                            Last  Test          Frequency    Reason                     Performed    Due Date  --------------------------------------------------------------------------------    HBA1C.......  6 months...  metFORMIN................  07- 01-    [facility] Embedded Care Due Messages. Reference number: [id number].   11/02/2023 7:42:59 AM CDT

## 2023-11-06 ENCOUNTER — PATIENT MESSAGE (OUTPATIENT)
Dept: ADMINISTRATIVE | Facility: HOSPITAL | Age: 68
End: 2023-11-06
Payer: COMMERCIAL

## 2024-04-16 ENCOUNTER — TELEPHONE (OUTPATIENT)
Dept: PHARMACY | Facility: CLINIC | Age: 69
End: 2024-04-16
Payer: COMMERCIAL

## 2024-04-30 DIAGNOSIS — E78.5 DYSLIPIDEMIA: ICD-10-CM

## 2024-04-30 RX ORDER — ROSUVASTATIN CALCIUM 10 MG/1
10 TABLET, COATED ORAL
Qty: 90 TABLET | Refills: 0 | Status: SHIPPED | OUTPATIENT
Start: 2024-04-30

## 2024-06-05 ENCOUNTER — PATIENT MESSAGE (OUTPATIENT)
Dept: ADMINISTRATIVE | Facility: HOSPITAL | Age: 69
End: 2024-06-05
Payer: COMMERCIAL

## 2024-06-24 ENCOUNTER — OFFICE VISIT (OUTPATIENT)
Dept: FAMILY MEDICINE | Facility: CLINIC | Age: 69
End: 2024-06-24
Payer: COMMERCIAL

## 2024-06-24 VITALS
HEART RATE: 84 BPM | HEIGHT: 63 IN | TEMPERATURE: 98 F | BODY MASS INDEX: 36.14 KG/M2 | WEIGHT: 203.94 LBS | OXYGEN SATURATION: 96 % | SYSTOLIC BLOOD PRESSURE: 132 MMHG | DIASTOLIC BLOOD PRESSURE: 86 MMHG

## 2024-06-24 DIAGNOSIS — Z00.00 ROUTINE ADULT HEALTH MAINTENANCE: Primary | ICD-10-CM

## 2024-06-24 DIAGNOSIS — E11.9 TYPE 2 DIABETES MELLITUS WITHOUT COMPLICATION, WITHOUT LONG-TERM CURRENT USE OF INSULIN: ICD-10-CM

## 2024-06-24 DIAGNOSIS — E66.01 SEVERE OBESITY (BMI 35.0-39.9) WITH COMORBIDITY: ICD-10-CM

## 2024-06-24 DIAGNOSIS — I10 ESSENTIAL HYPERTENSION: ICD-10-CM

## 2024-06-24 DIAGNOSIS — Z78.0 ASYMPTOMATIC POSTMENOPAUSAL STATE: ICD-10-CM

## 2024-06-24 DIAGNOSIS — Z11.59 NEED FOR HEPATITIS C SCREENING TEST: ICD-10-CM

## 2024-06-24 DIAGNOSIS — E11.8 DIABETIC FOOT: ICD-10-CM

## 2024-06-24 DIAGNOSIS — M13.0 ARTHRITIS OF MULTIPLE SITES: ICD-10-CM

## 2024-06-24 PROCEDURE — 99397 PER PM REEVAL EST PAT 65+ YR: CPT | Mod: S$GLB,,, | Performed by: INTERNAL MEDICINE

## 2024-06-24 PROCEDURE — 3008F BODY MASS INDEX DOCD: CPT | Mod: CPTII,S$GLB,, | Performed by: INTERNAL MEDICINE

## 2024-06-24 PROCEDURE — 1101F PT FALLS ASSESS-DOCD LE1/YR: CPT | Mod: CPTII,S$GLB,, | Performed by: INTERNAL MEDICINE

## 2024-06-24 PROCEDURE — 1126F AMNT PAIN NOTED NONE PRSNT: CPT | Mod: CPTII,S$GLB,, | Performed by: INTERNAL MEDICINE

## 2024-06-24 PROCEDURE — 3079F DIAST BP 80-89 MM HG: CPT | Mod: CPTII,S$GLB,, | Performed by: INTERNAL MEDICINE

## 2024-06-24 PROCEDURE — 3075F SYST BP GE 130 - 139MM HG: CPT | Mod: CPTII,S$GLB,, | Performed by: INTERNAL MEDICINE

## 2024-06-24 PROCEDURE — 99214 OFFICE O/P EST MOD 30 MIN: CPT | Mod: 25,S$GLB,, | Performed by: INTERNAL MEDICINE

## 2024-06-24 PROCEDURE — 99999 PR PBB SHADOW E&M-EST. PATIENT-LVL IV: CPT | Mod: PBBFAC,,, | Performed by: INTERNAL MEDICINE

## 2024-06-24 PROCEDURE — 1159F MED LIST DOCD IN RCRD: CPT | Mod: CPTII,S$GLB,, | Performed by: INTERNAL MEDICINE

## 2024-06-24 PROCEDURE — 3288F FALL RISK ASSESSMENT DOCD: CPT | Mod: CPTII,S$GLB,, | Performed by: INTERNAL MEDICINE

## 2024-06-24 RX ORDER — DICLOFENAC SODIUM 10 MG/G
2 GEL TOPICAL 4 TIMES DAILY PRN
Qty: 100 G | Refills: 1 | Status: SHIPPED | OUTPATIENT
Start: 2024-06-24

## 2024-06-24 RX ORDER — SEMAGLUTIDE 0.68 MG/ML
0.25 INJECTION, SOLUTION SUBCUTANEOUS
Qty: 3 ML | Refills: 0 | Status: SHIPPED | OUTPATIENT
Start: 2024-06-24 | End: 2024-08-20

## 2024-06-24 NOTE — Clinical Note
Fermin Taylor! Could you see if pt happened to have a more recent mammogram from [facility] (Dr Diane?) I told her I think since she had it in August she probably didn't have one this year but she thinks she did...  Yuko Diane MD [address] [place] [phone] (Work) [phone] (Fax)

## 2024-06-24 NOTE — PROGRESS NOTES
Subjective:     Chief Complaint   Patient presents with    Annual Exam       HPI  Keyona Herrera is a 68 y.o. female with medical diagnoses as listed in the medical history and problem list that presents for above complaint(s).    No new symptoms currently   Still working full time - notes fatigue   With respect to type 2 diabetes mellitus - she is monitoring her diet, trying to adjust her inake     Type 2 Diabetes  Last A1c was 8.1% last year  She is taking Metformin  She is interested in Digital Medicine program at this time     [facility] - last mammo earlier this year     She has vacation upcoming for 2 weeks     Patient Care Team:  Cher Palomo MD as PCP - General (Family Medicine)  Pan Horowitz MA as Care Coordinator      PAST MEDICAL HISTORY:  Past Medical History:   Diagnosis Date    HTN (hypertension)     Hyperlipidemia     Obesity     Personal history of colonic polyps        PAST SURGICAL HISTORY:  Past Surgical History:   Procedure Laterality Date     SECTION      2    CHOLECYSTECTOMY      COLONOSCOPY N/A 2016    Procedure: COLONOSCOPY;  Surgeon: Edy Chanel MD;  Location: [facility];  Service: Endoscopy;  Laterality: N/A;    COLONOSCOPY N/A 2022    Procedure: COLONOSCOPY;  Surgeon: Judy Hernandez MD;  Location: [facility];  Service: Endoscopy;  Laterality: N/A;  case request converted to ambulatory referral     instr portal    HYSTERECTOMY      INCISIONAL BREAST BIOPSY      surgery on foot      TOTAL KNEE ARTHROPLASTY Right 3/4/2019    Procedure: REPLACEMENT-KNEE-TOTAL;  Surgeon: [facility] L. [facility] Jr., MD;  Location: [address];  Service: Orthopedics;  Laterality: Right;       SOCIAL HISTORY:  Social History     Socioeconomic History    Marital status:    Tobacco Use    Smoking status: Never    Smokeless tobacco: Never   Substance and Sexual Activity    Alcohol use: Yes     Comment: socail drinker/ not often    Drug use: No     "Sexual activity: Yes     Partners: Male       FAMILY HISTORY:  Family History   Problem Relation Name Age of Onset    Dementia Mother      Aneurysm Father          AAA    Hypertension Unknown      Cancer Maternal Grandfather          colon cancer - ? age of onset       ALLERGIES AND MEDICATIONS: updated and reviewed.  Review of patient's allergies indicates:  No Known Allergies  Current Outpatient Medications   Medication Sig Dispense Refill    aspirin (ECOTRIN) 325 MG EC tablet Take 1 tablet (325 mg total) by mouth 2 (two) times daily. 60 tablet 0    coenzyme Q10 100 mg capsule Take 100 mg by mouth once daily.      flaxseed oil 1,000 mg Cap Take by mouth. 1 Capsule Oral Every day      hydroCHLOROthiazide (HYDRODIURIL) 25 MG tablet TAKE 1 TABLET(25 MG) BY MOUTH EVERY DAY 90 tablet 3    metFORMIN (GLUCOPHAGE) 500 MG tablet Take 1 tablet once daily with meal for 1 week, then increase to 1 tablet twice daily with meals thereafter 180 tablet 3    multivitamin capsule Take by mouth. 1 Capsule Oral Every day      omega-3 fatty acids 1,000 mg Cap Take by mouth. 1 Capsule Oral Every day      rosuvastatin (CRESTOR) 10 MG tablet TAKE 1 TABLET(10 MG) BY MOUTH EVERY DAY 90 tablet 0    diclofenac sodium (VOLTAREN) 1 % Gel Apply 2 g topically 4 (four) times daily as needed (arthritis pain). 100 g 1    DM/acetaminophen/doxylamine (CORICIDIN HBP COLD-MULTI SYMPT ORAL) Take by mouth. (Patient not taking: Reported on 6/24/2024)      semaglutide (OZEMPIC) 0.25 mg or 0.5 mg (2 mg/3 mL) pen injector Inject 0.25 mg into the skin every 7 days. 3 mL 0     No current facility-administered medications for this visit.         Objective:       Physical Exam  Vitals:    06/24/24 1425   BP: 132/86   Pulse: 84   Temp: 98 °F (36.7 °C)   TempSrc: Oral   SpO2: 96%   Weight: 92.5 kg (203 lb 14.8 oz)   Height: 5' 3" (1.6 m)    Body mass index is 36.12 kg/m².  Weight: 92.5 kg (203 lb 14.8 oz)   Height: 5' 3" (160 cm)   Physical Exam  Vitals reviewed. "   Constitutional:       General: She is not in acute distress.     Appearance: She is well-developed. She is obese.   HENT:      Head: Normocephalic and atraumatic.      Right Ear: External ear normal.      Left Ear: External ear normal.      Nose: Nose normal.      Mouth/Throat:      Pharynx: No oropharyngeal exudate.   Eyes:      General: Lids are normal.      Extraocular Movements: Extraocular movements intact.      Conjunctiva/sclera: Conjunctivae normal.      Pupils: Pupils are equal, round, and reactive to light.   Neck:      Thyroid: No thyromegaly.   Cardiovascular:      Rate and Rhythm: Normal rate.      Heart sounds: Normal heart sounds. No murmur heard.     No friction rub. No gallop.   Pulmonary:      Effort: Pulmonary effort is normal. No respiratory distress.      Breath sounds: Normal breath sounds. No wheezing.   Abdominal:      General: There is no distension.      Palpations: Abdomen is soft. There is no mass.      Tenderness: There is no abdominal tenderness. There is no guarding or rebound.      Hernia: No hernia is present.   Musculoskeletal:         General: No deformity.      Cervical back: Normal range of motion and neck supple.   Lymphadenopathy:      Cervical: No cervical adenopathy.   Skin:     General: Skin is warm and dry.      Findings: No erythema or rash.   Neurological:      Mental Status: She is alert.      Cranial Nerves: No cranial nerve deficit.   Psychiatric:         Behavior: Behavior normal.     Protective Sensation (w/ 10 gram monofilament):  Right: Intact  Left: Intact    Visual Inspection:  Onychomycosis -  Bilateral    Pedal Pulses:   Right: Present  Left: Present    Posterior Tibialis Pulses:   Right:Present  Left: Present         Assessment:     1. Routine adult health maintenance    2. Type 2 diabetes mellitus without complication, without long-term current use of insulin    3. Essential hypertension    4. Arthritis of multiple sites    5. Need for hepatitis C screening  test    6. Asymptomatic postmenopausal state    7. Diabetic foot    8. Severe obesity (BMI 35.0-39.9) with comorbidity      Plan:     Keyona Herrera was seen today for routine physical. Separate E/M Code for acute conditions discussed during today's routine physical examination have been documented in the assessment and plan below.    Diagnoses and all orders for this visit:    Routine adult health maintenance  Discussed healthy diet, regular exercise, necessary labs, age appropriate cancer screening, and routine vaccinations.      Type 2 diabetes mellitus without complication, without long-term current use of insulin  Severe obesity (BMI 35.0-39.9) with comorbidity  Goal A1c <7% - discussed current pharmacotherapy - amenable to starting GLP1a therapy  Counseled regarding potential side effects of medication, including GI irritation  -     Lipid Panel; Future  -     Comprehensive Metabolic Panel; Future  -     Hemoglobin A1C; Future  -     semaglutide (OZEMPIC) 0.25 mg or 0.5 mg (2 mg/3 mL) pen injector; Inject 0.25 mg into the skin every 7 days.    Essential hypertension  BP controlled presently - reviewed anti-hypertensive regimen - continue current therapy    Arthritis of multiple sites  Topical anti-inflammatory prescfribed  -     diclofenac sodium (VOLTAREN) 1 % Gel; Apply 2 g topically 4 (four) times daily as needed (arthritis pain).    Need for hepatitis C screening test  Ordered per USPSTF guidelines  -     Hepatitis C Antibody; Future    Asymptomatic postmenopausal state  DEXA scan ordered   -     DXA Bone Density Axial Skeleton 1 or more sites; Future    Diabetic foot  Referral to podiatry for foot/nail care  -     Ambulatory referral/consult to Podiatry; Future      Health Maintenance reviewed, addressed as per orders - should have updated mammo (if not on file already for 2024)    F/u in 4-6 months      1. The patient indicates understanding of these issues and agrees with the plan. Brief care plan  is updated and reviewed with the patient as applicable.   2. The patient is given an After Visit Summary that lists all medications with directions, allergies, orders placed during this encounter and follow-up instructions.   3. I have reviewed the patient's medical information including past medical, family, and social history sections including the medications and allergies.   4. We discussed the patient's current medications. I reconciled the patient's medication list and prepared and supplied needed refills.       Jerzy Roca MD  Internal Medicine-Pediatrics

## 2024-06-25 ENCOUNTER — PATIENT OUTREACH (OUTPATIENT)
Dept: ADMINISTRATIVE | Facility: HOSPITAL | Age: 69
End: 2024-06-25
Payer: COMMERCIAL

## 2024-06-25 ENCOUNTER — LAB VISIT (OUTPATIENT)
Dept: LAB | Facility: HOSPITAL | Age: 69
End: 2024-06-25
Attending: INTERNAL MEDICINE
Payer: COMMERCIAL

## 2024-06-25 DIAGNOSIS — E11.9 TYPE 2 DIABETES MELLITUS WITHOUT COMPLICATION, WITHOUT LONG-TERM CURRENT USE OF INSULIN: ICD-10-CM

## 2024-06-25 LAB
ANION GAP SERPL CALC-SCNC: 13 MMOL/L (ref 8–16)
BUN SERPL-MCNC: 16 MG/DL (ref 8–23)
CALCIUM SERPL-MCNC: 9.6 MG/DL (ref 8.7–10.5)
CHLORIDE SERPL-SCNC: 98 MMOL/L (ref 95–110)
CO2 SERPL-SCNC: 25 MMOL/L (ref 23–29)
CREAT SERPL-MCNC: 0.8 MG/DL (ref 0.5–1.4)
EST. GFR  (NO RACE VARIABLE): >60 ML/MIN/1.73 M^2
ESTIMATED AVG GLUCOSE: 272 MG/DL (ref 68–131)
GLUCOSE SERPL-MCNC: 302 MG/DL (ref 70–110)
HBA1C MFR BLD: 11.1 % (ref 4–5.6)
POTASSIUM SERPL-SCNC: 3.8 MMOL/L (ref 3.5–5.1)
SODIUM SERPL-SCNC: 136 MMOL/L (ref 136–145)

## 2024-06-25 PROCEDURE — 80048 BASIC METABOLIC PNL TOTAL CA: CPT | Performed by: INTERNAL MEDICINE

## 2024-06-25 PROCEDURE — 83036 HEMOGLOBIN GLYCOSYLATED A1C: CPT | Performed by: INTERNAL MEDICINE

## 2024-06-25 PROCEDURE — 36415 COLL VENOUS BLD VENIPUNCTURE: CPT | Mod: PO | Performed by: INTERNAL MEDICINE

## 2024-06-25 RX ORDER — INSULIN GLARGINE 100 [IU]/ML
12 INJECTION, SOLUTION SUBCUTANEOUS NIGHTLY
Qty: 9 ML | Refills: 2 | Status: SHIPPED | OUTPATIENT
Start: 2024-06-25 | End: 2025-06-25

## 2024-06-25 RX ORDER — ISOPROPYL ALCOHOL 70 ML/100ML
1 SWAB TOPICAL 2 TIMES DAILY
Qty: 100 EACH | Refills: 11 | Status: SHIPPED | OUTPATIENT
Start: 2024-06-25

## 2024-06-25 RX ORDER — INSULIN PUMP SYRINGE, 3 ML
EACH MISCELLANEOUS
Qty: 1 EACH | Refills: 0 | Status: SHIPPED | OUTPATIENT
Start: 2024-06-25 | End: 2025-06-25

## 2024-06-25 RX ORDER — PEN NEEDLE, DIABETIC 33 GX5/32"
1 NEEDLE, DISPOSABLE MISCELLANEOUS NIGHTLY
Qty: 100 EACH | Refills: 11 | Status: SHIPPED | OUTPATIENT
Start: 2024-06-25

## 2024-06-25 RX ORDER — LANCETS
1 EACH MISCELLANEOUS 2 TIMES DAILY
Qty: 100 EACH | Refills: 11 | Status: SHIPPED | OUTPATIENT
Start: 2024-06-25

## 2024-06-26 ENCOUNTER — TELEPHONE (OUTPATIENT)
Dept: FAMILY MEDICINE | Facility: CLINIC | Age: 69
End: 2024-06-26
Payer: COMMERCIAL

## 2024-06-26 ENCOUNTER — TELEPHONE (OUTPATIENT)
Dept: DIABETES | Facility: CLINIC | Age: 69
End: 2024-06-26
Payer: COMMERCIAL

## 2024-06-26 ENCOUNTER — PATIENT OUTREACH (OUTPATIENT)
Dept: ADMINISTRATIVE | Facility: HOSPITAL | Age: 69
End: 2024-06-26
Payer: COMMERCIAL

## 2024-06-26 ENCOUNTER — PATIENT MESSAGE (OUTPATIENT)
Dept: FAMILY MEDICINE | Facility: CLINIC | Age: 69
End: 2024-06-26
Payer: COMMERCIAL

## 2024-06-26 DIAGNOSIS — E11.9 TYPE 2 DIABETES MELLITUS WITHOUT COMPLICATION, UNSPECIFIED WHETHER LONG TERM INSULIN USE: Primary | ICD-10-CM

## 2024-06-26 NOTE — TELEPHONE ENCOUNTER
----- Message from Jovany Flores MA sent at 6/26/2024  2:42 PM CDT -----  Type: Patient Call Back    Who called: Self    What is the request in detail: is asking for a nurse to call her concerning one of her medications please..     insulin glargine U-100, Lantus, (LANTUS SOLOSTAR U-100 INSULIN) 100 unit/mL (3 mL) InPn pen    Can the clinic reply by [facility]?NO    Would the patient rather a call back or a response via [facility]? Yes, call     Best call back number: [phone] (home)

## 2024-06-26 NOTE — TELEPHONE ENCOUNTER
Spoke with patient and informed her to speak with the pharmacist in regards to her lantus. Patient is not sure how to use the pen. Brandy is leaving to go out of town on tomorrow..

## 2024-07-08 ENCOUNTER — TELEPHONE (OUTPATIENT)
Dept: FAMILY MEDICINE | Facility: CLINIC | Age: 69
End: 2024-07-08
Payer: COMMERCIAL

## 2024-07-08 NOTE — TELEPHONE ENCOUNTER
There was an appointment for you on 07/08/2024 but you no show therefore I have reschedule your appointment to 07/12/24 @ 11:00 am  Thanks

## 2024-07-11 ENCOUNTER — CLINICAL SUPPORT (OUTPATIENT)
Dept: DIABETES | Facility: CLINIC | Age: 69
End: 2024-07-11
Payer: COMMERCIAL

## 2024-07-11 DIAGNOSIS — E11.9 TYPE 2 DIABETES MELLITUS WITHOUT COMPLICATION, WITHOUT LONG-TERM CURRENT USE OF INSULIN: ICD-10-CM

## 2024-07-11 PROCEDURE — G0108 DIAB MANAGE TRN  PER INDIV: HCPCS | Mod: 95,,, | Performed by: FAMILY MEDICINE

## 2024-07-11 NOTE — PROGRESS NOTES
Diabetes Care Specialist Progress Note  Author: Juany Edwards RN  Date: 7/11/2024    Diabetes Care Specialist Virtual Visit Note   The patient location is: [place]  The chief complaint leading to consultation is: Diabetes  Visit type: audiovisual  Total time spent with patient: 50 min   Each patient to whom he or she provides medical services by telemedicine is:  (1) informed of the relationship between the physician and patient and the respective role of any other health care provider with respect to management of the patient; and (2) notified that he or she may decline to receive medical services by telemedicine and may withdraw from such care at any time.        Intake  Program Intake  Reason for Diabetes Program Visit:: Initial Diabetes Assessment  Current diabetes risk level:: high  In the last 12 months, have you:: none  Permission to speak with others about care:: yes    Continuous Glucose Monitoring  Patient has CGM: No    Current Diabetes Treatment: Oral Medications, Insulin, DM Injectables  Oral Medication Type/Dose: Metformin X1 for 1 week, then X2 daily  DM Injectables Type/Dose: Ozempic 0.25mg weekly  Method of delivery?: Injections  Injection Type: Pens  Pen Type/Dose: Lantus 12U daily      Patient is able to identify current diabetes medications, dosages, and appropriate timing of medications.: yes  Patient reports problems or concerns with current medication regimen.: no  Patient is  aware that some diabetes medications can cause low blood sugar?: No  Medication Skills Assessment Completed:: Yes  Assessment indicates:: Instruction Needed, Knowledge deficit  Area of need?: Yes    Lab Results   Component Value Date    HGBA1C 11.1 (H) 06/25/2024     Lifestyle Coping Support & Clinical  Lifestyle/Coping/Support  Compared to other people your age, how would you rate your health?: Good  Does anyone in your family have diabetes or does anyone in your family support you in your diabetes care?:  spouse  List anything about Diabetes that causes you stress?: At the beginning when she first found out.  How do you deal with stess/distress?: Family support (sarahy. daughter in law)  Learning Barriers:: Visual, Auditory  Culture or Hoahaoism beliefs that may impact ability to access healthcare: No         Diabetes Self-Management Skills Assessment  Diabetes Disease Process/Treatment Options  Diabetes Type?: Type II  When were you diagnosed?: 6/24/24  If previous diabetes education, when/where:: No  What are your goals for this education session?: Learn more about diabetes and how to eat.  Is patient aware of what causes diabetes?: No  Does patient understand the pathophysiology of diabetes?: No  Diabetes Disease Process/Treatment Options: Skills Assessment Completed: Yes  Assessment indicates:: Knowledge deficit, Instruction Needed  Area of need?: Yes    Nutrition/Healthy Eating  Meal Plan 24 Hour Recall - Breakfast: Glucerna  Meal Plan 24 Hour Recall - Lunch: salad w/chicken, + veggies (lettuce, cumcubers, tomatoes)  Meal Plan 24 Hour Recall - Dinner: Chicken soup w/potatotes and noodles  Meal Plan 24 Hour Recall - Snack: Prezels or banana or apple  Meal Plan 24 Hour Recall - Beverage: Lemonsade, ice tea, or water  Who shops/cooks?: Patient does the cooking.  Patient can identify foods that impact blood sugar.: yes (breads and rice, etc)  Challenges to healthy eating:: portion control  Nutrition/Healthy Eating Skills Assessment Completed:: Yes  Assessment indicates:: Instruction Needed, Knowledge deficit  Area of need?: Yes    Physical Activity/Exercise  Physical Activity/Exercise Skills Assessment Completed: : No  Deffered due to:: Time    Home Blood Glucose Monitoring  Patient states that blood sugar is checked at home daily.: yes  Monitoring Method:: home glucometer  Home glucometer meter type:: Insurance Preferred Meter  When you check what is your typical blood sugar range? : 200's  What is your A1c Target?:  7.0%  Home Blood Glucose Monitoring Skills Assessment Completed: : Yes  Assessment indicates:: Instruction Needed, Knowledge deficit  Area of need?: Yes    Acute Complications  Have you ever had hypoglycemia (low BG 70 or less)?: no  Do you know the symptoms of low blood sugar and how to treat?: No  Have you ever had hyperglycemia (high  or more)?: no   Do you know the symptoms of high blood sugar and how to treat: No  Have you ever had DKA?: no  Do you ever test for ketones?: no  Do you have a sick day plan?: no  Acute Complications Skills Assessment Completed: : No  Area of need?: Deferred    Chronic Complications  Chronic Complications Skills Assessment Completed: : No  Deferred due to:: Time      Assessment Summary and Plan    Based on today's diabetes care assessment, the following areas of need were identified:          7/11/2024    12:01 AM   Areas of Need   Diabetes Disease Process/Treatment Options Yes- Emailed DM management guide to patient. Provided instruction regarding what is diabetes, signs and symptoms, and risk factors of diabetes.  Discussed the significance of current A1c and blood glucose goals.   Nutrition/Healthy Eating Yes-see care planning   Medication Yes- Reviewed patient's diabetes medication regimen.    Home Blood Glucose Monitoring Yes- Discussed BS goals and importance of monitoring and recording BS for review and maintenance of medication. Patient encouraged to document glucose results and bring them to every clinic visit.    Acute Complications Deferred     Today's interventions were provided through individual discussion, instruction, and written materials were provided.      Patient verbalized understanding of instruction and written materials.  Pt was able to return back demonstration of instructions today. Patient understood key points, needs reinforcement and further instruction.     Diabetes Self-Management Care Plan:    Today's Diabetes Self-Management Care Plan was  developed with Keyona's input. Keyona has agreed to work toward the following goal(s) to improve his/her overall diabetes control.      Care Plan: Diabetes Management   Updates made since 6/11/2024 12:00 AM        Problem: Healthy Eating         Goal: Eat 2-3 meals daily with 30-45g/2-3 servings of Carbohydrate per meal. Limit snacking in between meal to 1 serving (15 grams).    Start Date: 7/11/2024   Expected End Date: 7/24/2024   Priority: High   Barriers: No Barriers Identified   Note:    7/11/24 - - We reviewed eating right with diabetes. We discussed importance of eating balanced meals with vegetables, fruits, lean meats, and whole grains. We concentrated on portion sizes at today's session. Overall meal planning and various choices for meals. Discussed carb vs non-carb foods, appropriate amount of carbs to have at meals/snacks and acceptable serving sizes of individual carb items. Obtained a 24-hr food recall from patient. Discussed various meal plan options to promote healthy eating.     - - Practiced reading food labels on various food items with patient focusing on serving size and total carbohydrate intake (not sugar intake). Instructed on appropriate serving sizes of specific carb containing foods. Stressed importance of eating a protein at each meal and include non-starchy vegetables at lunch/dinner. Instructed pt to aim for 2-3 evenly spaced meals or a small carb snack in place of a missed meal. Written education information provided to patient for use at home. Pt verbalized understanding of all the above.       Task: Reviewed the sources and role of Carbohydrate, Protein, and Fat and how each nutrient impacts blood sugar. Completed 7/11/2024        Task: Provided visual examples using dry measuring cups, food models, and other familiar objects such as computer mouse, deck or cards, tennis ball etc. to help with visualization of portions. Completed 7/11/2024        Task: Explained how to count  carbohydrates using the food label and the use of dry measuring cups for accurate carb counting. Completed 7/11/2024     Task: Recommended replacing beverages containing high sugar content with noncaloric/sugar free options and/or water. Completed 7/11/2024        Task: Review the importance of balancing carbohydrates with each meal using portion control techniques to count servings of carbohydrate and label reading to identify serving size and amount of total carbs per serving. Completed 7/11/2024        Task: Provided Sample plate method and reviewed the use of the plate to estimate amounts of carbohydrate per meal. Completed 7/11/2024        Follow Up Plan     Follow up in about 13 days (around 7/24/2024) for F/U #1 Review BS log, meal planning, and label reading.    Today's care plan and follow up schedule was discussed with patient.  Keyona verbalized understanding of the care plan, goals, and agrees to follow up plan.        The patient was encouraged to communicate with his/her health care provider/physician and care team regarding his/her condition(s) and treatment.  I provided the patient with my contact information today and encouraged to contact me via phone or [facility] Patient Portal as needed.     Length of Visit   Total Time: 50 Minutes

## 2024-07-12 ENCOUNTER — CLINICAL SUPPORT (OUTPATIENT)
Dept: FAMILY MEDICINE | Facility: CLINIC | Age: 69
End: 2024-07-12
Attending: FAMILY MEDICINE
Payer: COMMERCIAL

## 2024-07-12 DIAGNOSIS — E11.9 TYPE 2 DIABETES MELLITUS WITHOUT COMPLICATION, UNSPECIFIED WHETHER LONG TERM INSULIN USE: ICD-10-CM

## 2024-07-12 PROCEDURE — 92228 IMG RTA DETC/MNTR DS PHY/QHP: CPT | Mod: TC,S$GLB,, | Performed by: FAMILY MEDICINE

## 2024-07-12 PROCEDURE — 92228 IMG RTA DETC/MNTR DS PHY/QHP: CPT | Mod: 26,S$GLB,, | Performed by: OPTOMETRIST

## 2024-07-24 ENCOUNTER — CLINICAL SUPPORT (OUTPATIENT)
Dept: DIABETES | Facility: CLINIC | Age: 69
End: 2024-07-24
Payer: COMMERCIAL

## 2024-07-24 DIAGNOSIS — E11.9 TYPE 2 DIABETES MELLITUS WITHOUT COMPLICATION, WITHOUT LONG-TERM CURRENT USE OF INSULIN: Primary | ICD-10-CM

## 2024-07-24 PROCEDURE — G0108 DIAB MANAGE TRN  PER INDIV: HCPCS | Mod: S$GLB,,, | Performed by: FAMILY MEDICINE

## 2024-07-24 PROCEDURE — 99999 PR PBB SHADOW E&M-EST. PATIENT-LVL II: CPT | Mod: PBBFAC,,,

## 2024-07-24 NOTE — PROGRESS NOTES
"Diabetes Care Specialist Progress Note  Author: Juany Edwards RN  Date: 7/24/2024        Intake  Program Intake  Reason for Diabetes Program Visit:: Intervention  Type of Intervention:: Individual  Individual: Education  Education: Self-Management Skill Review, Nutrition and Meal Planning  Current diabetes risk level:: high  In the last 12 months, have you:: none  Permission to speak with others about care:: yes    Current Diabetes Treatment: Oral Medications, DM Injectables, Insulin  Oral Medication Type/Dose: Metformin X1 for 1 week, then X2 daily  DM Injectables Type/Dose: Ozempic 0.25mg weekly  Method of insulin delivery?: Injections  Injection Type: Pens  Pen Type/Dose: Lantus 12U daily    Continuous Glucose Monitoring  Patient has CGM: No    Lab Results   Component Value Date    HGBA1C 11.1 (H) 06/25/2024       Weight: 90.4 kg (199 lb 3.2 oz)   Height: 5' 3" (160 cm)   Body mass index is 35.29 kg/m².    Lifestyle Coping Support & Clinical  Lifestyle/Coping/Support  Compared to other people your age, how would you rate your health?: Good  Does anyone in your family have diabetes or does anyone in your family support you in your diabetes care?: M.Aunt ----spouse supports pt with diabetes care  List anything about Diabetes that causes you stress?: She worries a lot about managing her diabetes.  How do you deal with stress/distress?: Gets support from her daughter in law  Learning Barriers:: None  Culture or Adventist beliefs that may impact ability to access healthcare: No  Psychosocial/Coping Skills Assessment Completed: : Yes  Assessment indicates:: Instruction Needed, Knowledge deficit  Area of need?: No    Diabetes Self-Management Skills Assessment  Medication Skills Assessment  Patient is able to identify current diabetes medications, dosages, and appropriate timing of medications.: yes  Patient reports problems or concerns with current medication regimen.: no  Patient is  aware that some diabetes " medications can cause low blood sugar?: Yes  Medication Skills Assessment Completed:: Yes  Assessment indicates:: Instruction Needed, Knowledge deficit  Area of need?: Yes    Diabetes Disease Process/Treatment Options  Diabetes Type?: Type II  When were you diagnosed?: 6/24/24  If previous diabetes education, when/where:: 7/11/24  What are your goals for this education session?: Review nutrition  Is patient aware of what causes diabetes?: Yes  Does patient understand the pathophysiology of diabetes?: Yes  Diabetes Disease Process/Treatment Options: Skills Assessment Completed: Yes  Assessment indicates:: Instruction Needed  Area of need?: No    Nutrition/Healthy Eating  Meal Plan 24 Hour Recall - Breakfast: Glucerna  Meal Plan 24 Hour Recall - Lunch: salad w/chicken, + veggies (lettuce, cumcubers, tomatoes)  Meal Plan 24 Hour Recall - Dinner: Rice, meat, and vegetables  Meal Plan 24 Hour Recall - Snack: Prezels or banana or apple  Meal Plan 24 Hour Recall - Beverage: Water or lemonade  Who shops/cooks?: Patient  Patient can identify foods that impact blood sugar.: yes  Challenges to healthy eating:: portion control  Nutrition/Healthy Eating Skills Assessment Completed:: Yes  Assessment indicates:: Instruction Needed  Area of need?: Yes    Physical Activity/Exercise  Patient's daily activity level:: lightly active  Patient formally exercises outside of work.: yes  Frequency: twice a week  Patient can identify forms of physical activity.: yes  Physical Activity/Exercise Skills Assessment Completed: : Yes  Assessment indicates:: Instruction Needed  Area of need?: Yes    Home Blood Glucose Monitoring  Patient states that blood sugar is checked at home daily.: yes  Monitoring Method:: home glucometer  Home glucometer meter type:: Insurance Preferred Meter  Fasting BG range history:: see attachment and/or .....150's-190's  Postmeal BG range history:: see attachment and/or ......170's-200's  What  are your blood glucose targets?:   How often do you check your blood sugar?: BID  What is your A1c Target?: 7.0%  Home Blood Glucose Monitoring Skills Assessment Completed: : Yes  Assessment indicates:: Instruction Needed, Knowledge deficit  Area of need?: Yes    Acute Complications  Acute Complications Skills Assessment Completed: : No  Area of need?: Deferred    Chronic Complications  Chronic Complications Skills Assessment Completed: : No  Area of need?: Deferred      Assessment Summary and Plan    Based on today's diabetes care assessment, the following areas of need were identified:          7/24/2024    12:01 AM   Areas of Need   Medications/Current Diabetes Treatment Yes- Reviewed Patient's current diabetes medication regimen.    Lifestyle Coping Support No   Diabetes Disease Process/Treatment Options No   Nutrition/Healthy Eating Yes- see care planning   Physical Activity/Exercise Yes- - Discussed benefits of exercise as it relates to insulin resistance and weight loss.   Home Blood Glucose Monitoring Yes- Discussed BS goals and importance of monitoring and recording BS for review and maintenance of medication. Patient to continue documenting glucose results and bring them to every clinic visit.   Acute Complications Deferred   Chronic Complications Deferred       Today's interventions were provided through individual discussion, instruction, and written materials were provided.      Patient verbalized understanding of instruction and written materials.  Pt was able to return back demonstration of instructions today. Patient understood key points, needs reinforcement and further instruction.     Diabetes Self-Management Care Plan:    Today's Diabetes Self-Management Care Plan was developed with Keyona's input. Keyona has agreed to work toward the following goal(s) to improve his/her overall diabetes control.      Care Plan: Diabetes Management   Updates made since 6/29/2024 12:00 AM        Problem:  Healthy Eating         Goal: Eat 2-3 meals daily with 30-45g/2-3 servings of Carbohydrate per meal. Limit snacking in between meal to 1 serving (15 grams).    Start Date: 7/11/2024   Expected End Date: 7/24/2024   This Visit's Progress: On track   Priority: High   Barriers: No Barriers Identified   Note:    7/11/24 - - We reviewed eating right with diabetes. We discussed importance of eating balanced meals with vegetables, fruits, lean meats, and whole grains. We concentrated on portion sizes at today's session. Overall meal planning and various choices for meals. Discussed carb vs non-carb foods, appropriate amount of carbs to have at meals/snacks and acceptable serving sizes of individual carb items. Obtained a 24-hr food recall from patient. Discussed various meal plan options to promote healthy eating.     - - Practiced reading food labels on various food items with patient focusing on serving size and total carbohydrate intake (not sugar intake). Instructed on appropriate serving sizes of specific carb containing foods. Stressed importance of eating a protein at each meal and include non-starchy vegetables at lunch/dinner. Instructed pt to aim for 2-3 evenly spaced meals or a small carb snack in place of a missed meal. Written education information provided to patient for use at home. Pt verbalized understanding of all the above.    Care Plan Update 7/24/24 - - Pt has changed eating habits. She monitors her portion sizes. We reviewed carb sources of food, serving sizes, and appropriate serving sizes for meals and snacks. We discussed importance of eating balanced meals with vegetables, fruits, lean meats, and whole grains. Pt is aware to aim for 2-3 evenly spaced meals or a small carb snack in place of a missed meal. Pt plans to continue improving her eating habits.         Task: Reviewed the sources and role of Carbohydrate, Protein, and Fat and how each nutrient impacts blood sugar. Completed 7/11/2024         Task: Provided visual examples using dry measuring cups, food models, and other familiar objects such as computer mouse, deck or cards, tennis ball etc. to help with visualization of portions. Completed 7/11/2024        Task: Explained how to count carbohydrates using the food label and the use of dry measuring cups for accurate carb counting. Completed 7/11/2024     Task: Recommended replacing beverages containing high sugar content with noncaloric/sugar free options and/or water. Completed 7/11/2024        Task: Review the importance of balancing carbohydrates with each meal using portion control techniques to count servings of carbohydrate and label reading to identify serving size and amount of total carbs per serving. Completed 7/11/2024        Task: Provided Sample plate method and reviewed the use of the plate to estimate amounts of carbohydrate per meal. Completed 7/11/2024        Follow Up Plan     Follow up in about 30 days (around 8/23/2024) for 1 mth f/u.    Today's care plan and follow up schedule was discussed with patient.  Keyona verbalized understanding of the care plan, goals, and agrees to follow up plan.        The patient was encouraged to communicate with his/her health care provider/physician and care team regarding his/her condition(s) and treatment.  I provided the patient with my contact information today and encouraged to contact me via phone or [facility] Portal as needed.     Length of Visit   Total Time: 60 Minutes

## 2024-07-29 ENCOUNTER — OFFICE VISIT (OUTPATIENT)
Dept: PODIATRY | Facility: CLINIC | Age: 69
End: 2024-07-29
Payer: COMMERCIAL

## 2024-07-29 VITALS — WEIGHT: 199.19 LBS | BODY MASS INDEX: 35.29 KG/M2 | HEIGHT: 63 IN

## 2024-07-29 VITALS — BODY MASS INDEX: 35.32 KG/M2 | WEIGHT: 199.31 LBS | HEIGHT: 63 IN

## 2024-07-29 DIAGNOSIS — E11.8 DIABETIC FOOT: Primary | ICD-10-CM

## 2024-07-29 DIAGNOSIS — B35.1 ONYCHOMYCOSIS DUE TO DERMATOPHYTE: ICD-10-CM

## 2024-07-29 PROCEDURE — 99999 PR PBB SHADOW E&M-EST. PATIENT-LVL IV: CPT | Mod: PBBFAC,,, | Performed by: PODIATRIST

## 2024-07-29 PROCEDURE — 1101F PT FALLS ASSESS-DOCD LE1/YR: CPT | Mod: CPTII,S$GLB,, | Performed by: PODIATRIST

## 2024-07-29 PROCEDURE — 99203 OFFICE O/P NEW LOW 30 MIN: CPT | Mod: S$GLB,,, | Performed by: PODIATRIST

## 2024-07-29 PROCEDURE — 1159F MED LIST DOCD IN RCRD: CPT | Mod: CPTII,S$GLB,, | Performed by: PODIATRIST

## 2024-07-29 PROCEDURE — 3288F FALL RISK ASSESSMENT DOCD: CPT | Mod: CPTII,S$GLB,, | Performed by: PODIATRIST

## 2024-07-29 PROCEDURE — 3008F BODY MASS INDEX DOCD: CPT | Mod: CPTII,S$GLB,, | Performed by: PODIATRIST

## 2024-07-29 PROCEDURE — 1126F AMNT PAIN NOTED NONE PRSNT: CPT | Mod: CPTII,S$GLB,, | Performed by: PODIATRIST

## 2024-07-29 PROCEDURE — 3046F HEMOGLOBIN A1C LEVEL >9.0%: CPT | Mod: CPTII,S$GLB,, | Performed by: PODIATRIST

## 2024-07-29 RX ORDER — CICLOPIROX 80 MG/ML
SOLUTION TOPICAL NIGHTLY
Qty: 6.6 ML | Refills: 3 | Status: SHIPPED | OUTPATIENT
Start: 2024-07-29

## 2024-07-29 RX ORDER — AMMONIUM LACTATE 12 G/100G
1 CREAM TOPICAL DAILY
Qty: 140 G | Refills: 5 | Status: SHIPPED | OUTPATIENT
Start: 2024-07-29

## 2024-07-29 NOTE — PATIENT INSTRUCTIONS
Kerasal for fungal nails apply daily to all toenails.  Can be found at [facility]

## 2024-07-31 NOTE — PROGRESS NOTES
Subjective:     Patient ID: Keyona Herrera is a 68 y.o. female.    Chief Complaint: Diabetes Mellitus, Diabetic Foot Exam (6/24/24 Dr Roca), and Foot Problem (tingling)    Keyona is a 68 y.o. female who presents to the clinic upon referral from Dr. Roca  for evaluation and treatment of diabetic feet. Keyona has a past medical history of HTN (hypertension), Hyperlipidemia, Obesity, and Personal history of colonic polyps (2012). Presents for diabetic foot risk assessment.     PCP: Cher Palomo MD    Date Last Seen by PCP: per above    Current shoe gear: Tennis shoes    Hemoglobin A1C   Date Value Ref Range Status   06/25/2024 11.1 (H) 4.0 - 5.6 % Final     Comment:     ADA Screening Guidelines:  5.7-6.4%  Consistent with prediabetes  >or=6.5%  Consistent with diabetes    High levels of fetal hemoglobin interfere with the HbA1C  assay. Heterozygous hemoglobin variants (HbS, HgC, etc)do  not significantly interfere with this assay.   However, presence of multiple variants may affect accuracy.     07/13/2023 8.1 (H) 4.0 - 5.6 % Final     Comment:     ADA Screening Guidelines:  5.7-6.4%  Consistent with prediabetes  >or=6.5%  Consistent with diabetes    High levels of fetal hemoglobin interfere with the HbA1C  assay. Heterozygous hemoglobin variants (HbS, HgC, etc)do  not significantly interfere with this assay.   However, presence of multiple variants may affect accuracy.     06/11/2022 7.1 (H) 4.0 - 5.6 % Final     Comment:     ADA Screening Guidelines:  5.7-6.4%  Consistent with prediabetes  >or=6.5%  Consistent with diabetes    High levels of fetal hemoglobin interfere with the HbA1C  assay. Heterozygous hemoglobin variants (HbS, HgC, etc)do  not significantly interfere with this assay.   However, presence of multiple variants may affect accuracy.           Review of Systems   Constitutional: Negative for chills.   Cardiovascular:  Negative for chest pain and claudication.   Respiratory:  Negative for  cough.    Skin:  Positive for color change, dry skin and nail changes.   Musculoskeletal:  Positive for joint pain.   Gastrointestinal:  Negative for nausea.   Neurological:  Positive for paresthesias. Negative for numbness.   Psychiatric/Behavioral:  The patient is not nervous/anxious.         Objective:     Physical Exam  Constitutional:       Appearance: She is well-developed.      Comments: Oriented to time, place, and person.   Cardiovascular:      Comments: DP and PT pulses are palpable bilaterally. 3 sec capillary refill time and toes and feet are warm to touch proximally .  There is  hair growth on the feet and toes b/l. There is no edema b/l. No spider veins or varicosities present b/l.     Musculoskeletal:      Comments: Equinus noted b/l ankles with < 10 deg DF noted. MMT 5/5 in DF/PF/Inv/Ev resistance with no reproduction of pain in any direction. Passive range of motion of ankle and pedal joints is painless b/l.     Feet:      Right foot:      Skin integrity: No callus or dry skin.      Left foot:      Skin integrity: No callus or dry skin.   Lymphadenopathy:      Comments: Negative lymphadenopathy bilateral popliteal fossa and tarsal tunnel.   Skin:     Comments: No open lesions, lacerations or wounds noted.Interdigital spaces clean, dry and intact b/l. No erythema noted to b/l foot.    Toenails 1-5 bilaterally are elongated by 2-3 mm, thickened by 2-3 mm, discolored/yellowed, dystrophic, brittle with subungual debris.       Neurological:      Mental Status: She is alert.      Comments: Light touch, proprioception, and sharp/dull sensation are all intact bilaterally. Protective threshold with the [place] monofilament is intact bilaterally.    Psychiatric:         Behavior: Behavior is cooperative.           Assessment:      Encounter Diagnoses   Name Primary?    Diabetic foot Yes    Onychomycosis due to dermatophyte      Plan:     Keyona was seen today for diabetes mellitus, diabetic foot  exam and foot problem.    Diagnoses and all orders for this visit:    Diabetic foot  -     Ambulatory referral/consult to Podiatry    Onychomycosis due to dermatophyte    Other orders  -     ammonium lactate 12 % Crea; Apply 1 application  topically once daily.  -     ciclopirox (PENLAC) 8 % Soln; Apply topically nightly.      I counseled the patient on her conditions, their implications and medical management.      At patient's request, I discussed different treatments for toenail fungus. We discussed oral antifungals but I did not recommend them as a first line treatment since the medication is taken internally and can have side effects such as rash, taste disturbances, and liver enzyme elevation. We discussed topical Penlac to be applied daily and removed weekly. Pt. Expresses understanding and would like to try the Penlac. Rx sent to the pharmacy.     Rx. Amlactin    Discussed conservative treatment with shoes of adequate dimensions, material, and style to alleviate symptoms and delay or prevent surgical intervention.    RTC PRN

## 2024-08-08 DIAGNOSIS — E78.5 DYSLIPIDEMIA: ICD-10-CM

## 2024-08-09 RX ORDER — ROSUVASTATIN CALCIUM 10 MG/1
10 TABLET, COATED ORAL DAILY
Qty: 90 TABLET | Refills: 0 | Status: SHIPPED | OUTPATIENT
Start: 2024-08-09 | End: 2024-08-09 | Stop reason: SDUPTHER

## 2024-08-09 RX ORDER — ROSUVASTATIN CALCIUM 10 MG/1
10 TABLET, COATED ORAL DAILY
Qty: 90 TABLET | Refills: 0 | Status: SHIPPED | OUTPATIENT
Start: 2024-08-09

## 2024-08-12 DIAGNOSIS — E78.5 DYSLIPIDEMIA: ICD-10-CM

## 2024-08-12 RX ORDER — ROSUVASTATIN CALCIUM 10 MG/1
10 TABLET, COATED ORAL DAILY
Qty: 90 TABLET | Refills: 0 | Status: SHIPPED | OUTPATIENT
Start: 2024-08-12

## 2024-08-12 NOTE — TELEPHONE ENCOUNTER
Spoke with patient stated called pharmacy stated rosuvastatin 10 mg never received , according to medication report it states transmission to pharmacy failed. Please be advised Dr Palomo , thank you. Medication re-pended thank you .

## 2024-08-12 NOTE — TELEPHONE ENCOUNTER
No care due was identified.  [facility] Embedded Care Due Messages. Reference number: [id number].   8/12/2024 10:08:06 AM CDT

## 2024-08-21 ENCOUNTER — PATIENT MESSAGE (OUTPATIENT)
Dept: FAMILY MEDICINE | Facility: CLINIC | Age: 69
End: 2024-08-21
Payer: COMMERCIAL

## 2024-08-21 ENCOUNTER — TELEPHONE (OUTPATIENT)
Dept: FAMILY MEDICINE | Facility: CLINIC | Age: 69
End: 2024-08-21
Payer: COMMERCIAL

## 2024-08-21 DIAGNOSIS — E11.9 TYPE 2 DIABETES MELLITUS WITHOUT COMPLICATION, WITHOUT LONG-TERM CURRENT USE OF INSULIN: ICD-10-CM

## 2024-08-21 RX ORDER — SEMAGLUTIDE 0.68 MG/ML
0.25 INJECTION, SOLUTION SUBCUTANEOUS
Qty: 3 ML | Refills: 0 | OUTPATIENT
Start: 2024-08-21 | End: 2024-10-16

## 2024-08-21 RX ORDER — PEN NEEDLE, DIABETIC 33 GX5/32"
1 NEEDLE, DISPOSABLE MISCELLANEOUS NIGHTLY
Qty: 100 EACH | Refills: 11 | Status: CANCELLED | OUTPATIENT
Start: 2024-08-21

## 2024-08-21 NOTE — TELEPHONE ENCOUNTER
----- Message from Vimal Henriquez sent at 8/21/2024  2:28 PM CDT -----  Regarding: Keyona  Type: RX Refill Request     Who Called: Keyona      Have you contacted your pharmacy: Yes     Refill or New Rx: Refill      RX Name and Strength: semaglutide (OZEMPIC) 0.25 mg or 0.5 mg (2 mg/3 mL) pen injector,// Patient stated that she reach out to the office about getting more needles and for the Ozempic prescription but she has not heard back from the office. Please reach out to her as soon as possible    Preferred Pharmacy with phone number:.  [facility] [address] [address]  Phone: [phone] Fax: [phone]    Local or Mail Order: Local     Ordering Provider: Dr. Jerzy Roca     Would the patient rather a call back or a response via [facility]? Callback      Best Call Back Number: [phone]      Additional Information:

## 2024-08-21 NOTE — TELEPHONE ENCOUNTER
No care due was identified.  [facility] Embedded Care Due Messages. Reference number: [id number].   8/21/2024 3:42:15 PM CDT

## 2024-08-21 NOTE — TELEPHONE ENCOUNTER
----- Message from Azeb Dangelo sent at 8/21/2024  4:51 PM CDT -----  Regarding: missed call  Type:  Patient Returning Call    Who Called: pt  Who Left Message for Patient: Aliza?   Does the patient know what this is regarding?: Ozempic refills    Would the patient rather a call back or a response via [facility]? call  Best Call Back Number: [phone]    Additional Information: pt requesting detailed message

## 2024-08-21 NOTE — TELEPHONE ENCOUNTER
No care due was identified.  [facility] Embedded Care Due Messages. Reference number: [id number].   8/21/2024 2:21:03 PM CDT

## 2024-09-10 DIAGNOSIS — E11.9 TYPE 2 DIABETES MELLITUS WITHOUT COMPLICATION, WITHOUT LONG-TERM CURRENT USE OF INSULIN: ICD-10-CM

## 2024-09-10 RX ORDER — METFORMIN HYDROCHLORIDE 500 MG/1
500 TABLET ORAL 2 TIMES DAILY WITH MEALS
Qty: 180 TABLET | Refills: 0 | Status: SHIPPED | OUTPATIENT
Start: 2024-09-10

## 2024-09-10 NOTE — TELEPHONE ENCOUNTER
No care due was identified.  [facility] Embedded Care Due Messages. Reference number: [id number].   9/10/2024 3:32:09 PM CDT

## 2024-09-10 NOTE — TELEPHONE ENCOUNTER
Refill Routing Note   Medication(s) are not appropriate for processing by [facility] for the following reason(s):        Patient not seen by provider within 15 months  No active prescription written by provider    [facility] action(s):  Defer               Appointments  past 12m or future 3m with PCP    Date Provider   Last Visit   6/3/2022 Cher Palomo MD   Next Visit   11/29/2024 Cher Palomo MD   ED visits in past 90 days: 0        Note composed:3:33 PM 09/10/2024

## 2024-09-12 DIAGNOSIS — E11.9 TYPE 2 DIABETES MELLITUS WITHOUT COMPLICATION, WITHOUT LONG-TERM CURRENT USE OF INSULIN: ICD-10-CM

## 2024-09-12 RX ORDER — BLOOD-GLUCOSE METER
EACH MISCELLANEOUS
Qty: 1 EACH | Refills: 0 | Status: SHIPPED | OUTPATIENT
Start: 2024-09-12

## 2024-09-12 NOTE — TELEPHONE ENCOUNTER
Refill Decision Note   Keyona Sharon  is requesting a refill authorization.  Brief Assessment and Rationale for Refill:  Approve     Medication Therapy Plan:         Comments:     Note composed:4:21 PM 09/12/2024

## 2024-09-27 ENCOUNTER — PATIENT MESSAGE (OUTPATIENT)
Dept: OPHTHALMOLOGY | Facility: CLINIC | Age: 69
End: 2024-09-27
Payer: COMMERCIAL

## 2024-10-02 ENCOUNTER — OFFICE VISIT (OUTPATIENT)
Dept: FAMILY MEDICINE | Facility: CLINIC | Age: 69
End: 2024-10-02
Payer: COMMERCIAL

## 2024-10-02 VITALS
HEART RATE: 80 BPM | WEIGHT: 191.69 LBS | OXYGEN SATURATION: 95 % | DIASTOLIC BLOOD PRESSURE: 82 MMHG | SYSTOLIC BLOOD PRESSURE: 124 MMHG | TEMPERATURE: 98 F | HEIGHT: 63 IN | BODY MASS INDEX: 33.96 KG/M2

## 2024-10-02 DIAGNOSIS — T16.2XXA FOREIGN BODY OF LEFT EAR, INITIAL ENCOUNTER: Primary | ICD-10-CM

## 2024-10-02 PROCEDURE — 3046F HEMOGLOBIN A1C LEVEL >9.0%: CPT | Mod: CPTII,S$GLB,,

## 2024-10-02 PROCEDURE — 3288F FALL RISK ASSESSMENT DOCD: CPT | Mod: CPTII,S$GLB,,

## 2024-10-02 PROCEDURE — 3074F SYST BP LT 130 MM HG: CPT | Mod: CPTII,S$GLB,,

## 2024-10-02 PROCEDURE — 1101F PT FALLS ASSESS-DOCD LE1/YR: CPT | Mod: CPTII,S$GLB,,

## 2024-10-02 PROCEDURE — 1126F AMNT PAIN NOTED NONE PRSNT: CPT | Mod: CPTII,S$GLB,,

## 2024-10-02 PROCEDURE — 69200 CLEAR OUTER EAR CANAL: CPT | Mod: LT,S$GLB,,

## 2024-10-02 PROCEDURE — 3008F BODY MASS INDEX DOCD: CPT | Mod: CPTII,S$GLB,,

## 2024-10-02 PROCEDURE — 99999 PR PBB SHADOW E&M-EST. PATIENT-LVL V: CPT | Mod: PBBFAC,,,

## 2024-10-02 PROCEDURE — 99214 OFFICE O/P EST MOD 30 MIN: CPT | Mod: 25,S$GLB,,

## 2024-10-02 PROCEDURE — 3079F DIAST BP 80-89 MM HG: CPT | Mod: CPTII,S$GLB,,

## 2024-10-02 PROCEDURE — 1159F MED LIST DOCD IN RCRD: CPT | Mod: CPTII,S$GLB,,

## 2024-10-02 NOTE — PROGRESS NOTES
HPI     Chief Complaint:  Chief Complaint   Patient presents with    Ear Fullness       Keyona Herrera is a 68 y.o. female with multiple medical diagnoses as listed in the medical history and problem list that presents for   Chief Complaint   Patient presents with    Ear Fullness   .   Patient is not known to me with her last appointment in this department on 7/12/2024.      HPI    Feels like something is stuck in left ear, uses qtips for earwax removal.     Assessment & Plan       Problem List Items Addressed This Visit    None  Visit Diagnoses       Foreign body of left ear, initial encounter    -  Primary  Foreign body removal    Date/Time: 10/2/2024 2:30 PM    Performed by: Sada Hunt NP  Authorized by: Sada Hunt NP  Body area: ear  Location details: left ear    Patient sedated: no  Removal mechanism: irrigation  Complexity: simple  1 objects recovered.  Objects recovered: 1cm small ball of white cotton  Post-procedure assessment: foreign body removed  Patient tolerance: Patient tolerated the procedure well with no immediate complications      -advised against q-tips  -Use debrox BID as needed and remove wax in shower                --------------------------------------------      Health Maintenance:  Health Maintenance         Date Due Completion Date    Hepatitis C Screening Never done ---    RSV Vaccine (Age 60+ and Pregnant patients) (1 - Risk 60-74 years 1-dose series) Never done ---    DEXA Scan 03/03/2024 3/3/2021    Diabetes Urine Screening 07/13/2024 7/13/2023    Influenza Vaccine (1) Never done ---    COVID-19 Vaccine (1 - 2024-25 season) Never done ---    Hemoglobin A1c 09/25/2024 6/25/2024    Lipid Panel 03/08/2025 3/8/2024    Eye Exam 07/15/2025 7/15/2024    Foot Exam 07/29/2025 7/29/2024 (Done)    Override on 7/29/2024: Done    Low Dose Statin 08/12/2025 8/12/2024    Mammogram 08/12/2025 8/12/2024    Colorectal Cancer Screening 11/14/2027 11/14/2022    TETANUS VACCINE 06/03/2032  "6/3/2022            Health maintenance reviewed    Follow Up:  No follow-ups on file.    Discussed DDx, condition, and treatment.   Education sent to patient portal/included in after visit summary.  ED precautions given.   Notify provider if symptoms do not resolve or increase in severity.   Patient verbalizes understanding and agrees with plan of care.    Exam     Review of Systems:  (as noted above)  Review of Systems    Physical Exam:   Physical Exam  Constitutional:       General: She is not in acute distress.     Appearance: Normal appearance. She is obese. She is not toxic-appearing.   HENT:      Head: Normocephalic and atraumatic.      Right Ear: Tympanic membrane, ear canal and external ear normal.      Left Ear: Tympanic membrane, ear canal and external ear normal.      Nose: Nose normal.   Neurological:      Mental Status: She is alert.       Vitals:    10/02/24 1440   BP: 124/82   Pulse: 80   Temp: 98.1 °F (36.7 °C)   TempSrc: Oral   SpO2: (!) 94%   Weight: 87 kg (191 lb 11.4 oz)   Height: 5' 3" (1.6 m)      Body mass index is 33.96 kg/m².        History     Past Medical History:  Past Medical History:   Diagnosis Date    HTN (hypertension)     Hyperlipidemia     Obesity     Personal history of colonic polyps        Past Surgical History:  Past Surgical History:   Procedure Laterality Date     SECTION      2    CHOLECYSTECTOMY      COLONOSCOPY N/A 2016    Procedure: COLONOSCOPY;  Surgeon: Edy Chanel MD;  Location: [facility];  Service: Endoscopy;  Laterality: N/A;    COLONOSCOPY N/A 2022    Procedure: COLONOSCOPY;  Surgeon: Judy Hernandez MD;  Location: [facility];  Service: Endoscopy;  Laterality: N/A;  case request converted to ambulatory referral     instr portal    HYSTERECTOMY      INCISIONAL BREAST BIOPSY      surgery on foot      TOTAL KNEE ARTHROPLASTY Right 3/4/2019    Procedure: REPLACEMENT-KNEE-TOTAL;  Surgeon: John L. [facility] Jr., MD;  [facility];  " Service: Orthopedics;  Laterality: Right;       Social History:  Social History     Socioeconomic History    Marital status:    Tobacco Use    Smoking status: Never    Smokeless tobacco: Never   Substance and Sexual Activity    Alcohol use: Yes     Comment: socail drinker/ not often    Drug use: No    Sexual activity: Yes     Partners: Male     Social Drivers of Health     Financial Resource Strain: Low Risk  (7/11/2024)    Overall Financial Resource Strain (CARDIA)     Difficulty of Paying Living Expenses: Not very hard   Food Insecurity: No Food Insecurity (7/11/2024)    Hunger Vital Sign     Worried About Running Out of Food in the Last Year: Never true     Ran Out of Food in the Last Year: Never true   Physical Activity: Unknown (7/11/2024)    Exercise Vital Sign     Days of Exercise per Week: 0 days   Stress: No Stress Concern Present (7/11/2024)    [employer] - Occupational Stress Questionnaire     Feeling of Stress : Not at all   Housing Stability: Unknown (7/11/2024)    Housing Stability Vital Sign     Unable to Pay for Housing in the Last Year: No       Family History:  Family History   Problem Relation Name Age of Onset    Dementia Mother      Aneurysm Father          AAA    Hypertension Unknown      Cancer Maternal Grandfather          colon cancer - ? age of onset       Allergies and Medications: (updated and reviewed)  Review of patient's allergies indicates:  No Known Allergies  Current Outpatient Medications   Medication Sig Dispense Refill    alcohol swabs (ALCOHOL PREP PADS) PadM Apply 1 each topically 2 (two) times a day. 100 each 11    ammonium lactate 12 % Crea Apply 1 application  topically once daily. 140 g 5    blood sugar diagnostic Strp TWICE DAILY BLOOD SUGAR TESTING; WHICHE[employer] BRAND COVERED BY INSURANCE 60 strip 11    ciclopirox (PENLAC) 8 % Soln Apply topically nightly. 6.6 mL 3    coenzyme Q10 100 mg capsule Take 100 mg by mouth once daily.       "diclofenac sodium (VOLTAREN) 1 % Gel Apply 2 g topically 4 (four) times daily as needed (arthritis pain). 100 g 1    DM/acetaminophen/doxylamine (CORICIDIN HBP COLD-MULTI SYMPT ORAL) Take by mouth.      flaxseed oil 1,000 mg Cap Take by mouth. 1 Capsule Oral Every day      hydroCHLOROthiazide (HYDRODIURIL) 25 MG tablet TAKE 1 TABLET(25 MG) BY MOUTH EVERY DAY 90 tablet 3    insulin glargine U-100, Lantus, (LANTUS SOLOSTAR U-100 INSULIN) 100 unit/mL (3 mL) InPn pen Inject 12 Units into the skin every evening. 9 mL 2    lancets (LANCETS,THIN) Misc 1 lancet  by Misc.(Non-Drug; Combo Route) route 2 (two) times a day. 100 each 11    metFORMIN (GLUCOPHAGE) 500 MG tablet Take 1 tablet (500 mg total) by mouth 2 (two) times daily with meals. 180 tablet 0    multivitamin capsule Take by mouth. 1 Capsule Oral Every day      omega-3 fatty acids 1,000 mg Cap Take by mouth. 1 Capsule Oral Every day      pen needle, diabetic (MICRODOT INSULIN PEN NEEDLE) 33 gauge x 5/32" Ndle 1 Needle by Misc.(Non-Drug; Combo Route) route nightly. 100 each 11    rosuvastatin (CRESTOR) 10 MG tablet Take 1 tablet (10 mg total) by mouth once daily. 90 tablet 0    semaglutide (OZEMPIC) 0.25 mg or 0.5 mg (2 mg/3 mL) pen injector Inject 0.25 mg into the skin every 7 days. 3 mL 0    TRUE METRIX GLUCOSE METER Misc USE AS DIRECTED 1 each 0    aspirin (ECOTRIN) 325 MG EC tablet Take 1 tablet (325 mg total) by mouth 2 (two) times daily. 60 tablet 0     No current facility-administered medications for this visit.       Patient Care Team:  Cher Palomo MD as PCP - General (Family Medicine)  Pan Horowitz MA as Care Coordinator  Yuko Diane MD as Obstetrician (Obstetrics and Gynecology)         - The patient is given an After Visit Summary that lists all medications with directions, allergies, education, orders placed during this encounter and follow-up instructions.      - I have reviewed the patient's medical information including past " medical, family, and social history sections including the medications and allergies.      - We discussed the patient's current medications.     This note was created by combination of typed  and MModal dictation.  Transcription errors may be present.  If there are any questions, please contact me.

## 2024-10-02 NOTE — PROCEDURES
Foreign body removal    Date/Time: 10/2/2024 2:30 PM    Performed by: Sada uHnt NP  Authorized by: Sada Hunt NP  Body area: ear  Location details: left ear    Patient sedated: no  Removal mechanism: irrigation  Complexity: simple  1 objects recovered.  Objects recovered: 1cm small ball of white cotton  Post-procedure assessment: foreign body removed  Patient tolerance: Patient tolerated the procedure well with no immediate complications

## 2024-10-23 ENCOUNTER — TELEPHONE (OUTPATIENT)
Dept: FAMILY MEDICINE | Facility: CLINIC | Age: 69
End: 2024-10-23
Payer: COMMERCIAL

## 2024-10-29 DIAGNOSIS — E78.5 DYSLIPIDEMIA: ICD-10-CM

## 2024-10-29 RX ORDER — ROSUVASTATIN CALCIUM 10 MG/1
10 TABLET, COATED ORAL
Qty: 90 TABLET | Refills: 0 | Status: SHIPPED | OUTPATIENT
Start: 2024-10-29

## 2024-11-14 ENCOUNTER — PATIENT MESSAGE (OUTPATIENT)
Dept: OPTOMETRY | Facility: CLINIC | Age: 69
End: 2024-11-14
Payer: COMMERCIAL

## 2024-11-15 ENCOUNTER — TELEPHONE (OUTPATIENT)
Dept: WOUND CARE | Facility: HOSPITAL | Age: 69
End: 2024-11-15
Payer: COMMERCIAL

## 2024-11-15 DIAGNOSIS — E11.9 TYPE 2 DIABETES MELLITUS WITHOUT COMPLICATION, WITHOUT LONG-TERM CURRENT USE OF INSULIN: Primary | ICD-10-CM

## 2024-11-15 NOTE — TELEPHONE ENCOUNTER
----- Message from Cuong Love sent at 11/15/2024  9:36 AM CST -----  Type: Lab    Caller is requesting to schedule their Lab appointment prior to annual appointment.    Order is not listed in EPIC.  Please enter order and contact patient to schedule.    Name of Caller:  Pt   Preferred Date and Time of Labs:    Date of Annual Appointment:  11/29  Where would they like the lab performed?  [facility]   Would the patient rather a call back or a response via [facility]?  Callback   Best Call Back Number:  Telephone Information:  Mobile          [phone]     Additional Information:    Thank you.

## 2024-11-23 ENCOUNTER — LAB VISIT (OUTPATIENT)
Dept: LAB | Facility: HOSPITAL | Age: 69
End: 2024-11-23
Attending: FAMILY MEDICINE
Payer: COMMERCIAL

## 2024-11-23 DIAGNOSIS — E11.9 TYPE 2 DIABETES MELLITUS WITHOUT COMPLICATION, UNSPECIFIED WHETHER LONG TERM INSULIN USE: ICD-10-CM

## 2024-11-23 LAB
ALBUMIN/CREAT UR: 9.1 UG/MG (ref 0–30)
CREAT UR-MCNC: 121 MG/DL (ref 15–325)
MICROALBUMIN UR DL<=1MG/L-MCNC: 11 UG/ML

## 2024-11-23 PROCEDURE — 82570 ASSAY OF URINE CREATININE: CPT | Performed by: FAMILY MEDICINE

## 2024-11-29 ENCOUNTER — OFFICE VISIT (OUTPATIENT)
Dept: FAMILY MEDICINE | Facility: CLINIC | Age: 69
End: 2024-11-29
Payer: COMMERCIAL

## 2024-11-29 VITALS
OXYGEN SATURATION: 96 % | WEIGHT: 186.5 LBS | BODY MASS INDEX: 33.04 KG/M2 | SYSTOLIC BLOOD PRESSURE: 120 MMHG | DIASTOLIC BLOOD PRESSURE: 82 MMHG | HEART RATE: 70 BPM | HEIGHT: 63 IN | TEMPERATURE: 98 F

## 2024-11-29 DIAGNOSIS — I10 ESSENTIAL HYPERTENSION: ICD-10-CM

## 2024-11-29 DIAGNOSIS — E78.5 HYPERLIPIDEMIA, UNSPECIFIED HYPERLIPIDEMIA TYPE: ICD-10-CM

## 2024-11-29 DIAGNOSIS — E11.9 TYPE 2 DIABETES MELLITUS WITHOUT COMPLICATION, WITHOUT LONG-TERM CURRENT USE OF INSULIN: Primary | ICD-10-CM

## 2024-11-29 DIAGNOSIS — E66.01 SEVERE OBESITY (BMI 35.0-39.9) WITH COMORBIDITY: ICD-10-CM

## 2024-11-29 PROCEDURE — 99999 PR PBB SHADOW E&M-EST. PATIENT-LVL V: CPT | Mod: PBBFAC,,, | Performed by: FAMILY MEDICINE

## 2024-11-29 RX ORDER — SEMAGLUTIDE 1.34 MG/ML
1 INJECTION, SOLUTION SUBCUTANEOUS
Qty: 3 ML | Refills: 5 | Status: SHIPPED | OUTPATIENT
Start: 2024-11-29 | End: 2025-11-29

## 2024-11-29 RX ORDER — PEN NEEDLE, DIABETIC 32GX 5/32"
NEEDLE, DISPOSABLE MISCELLANEOUS
COMMUNITY
Start: 2024-10-13

## 2024-11-29 RX ORDER — SEMAGLUTIDE 0.68 MG/ML
0.5 INJECTION, SOLUTION SUBCUTANEOUS
Qty: 3 ML | Refills: 0 | Status: SHIPPED | OUTPATIENT
Start: 2024-11-29 | End: 2025-01-24

## 2024-11-29 RX ORDER — LANCETS 33 GAUGE
EACH MISCELLANEOUS 2 TIMES DAILY
COMMUNITY
Start: 2024-06-25

## 2024-11-29 RX ORDER — LANCETS 33 GAUGE
EACH MISCELLANEOUS
COMMUNITY
Start: 2024-11-27

## 2024-11-29 NOTE — PROGRESS NOTES
"Routine Office Visit     Patient Name: Keyona Herrera    : 1955  MRN: [id number]    Subjective     History of Present Illness    CHIEF COMPLAINT:  Patient presents today for diabetes management and follow-up.  Diabetes   Hypertension   Hyperlipidemia     DIABETES MANAGEMENT:  She is on Ozempic 0.25 mg weekly since July, insulin, and metformin. Her A1C has improved significantly from 11 to 6.9. She reports improved blood sugar and weight loss. She is actively tracking her diet and blood sugar.    CARDIOVASCULAR HEALTH:  She is taking Crestor for cholesterol management and medications for blood pressure control. She reports a significant drop in cholesterol levels from 250 to 150, though her HDL cholesterol is noted to be slightly low.    SURGICAL HISTORY:  She had cataract surgery approximately 2-3 years ago. She continues regular follow-ups with her ophthalmologists, with her most recent eye exam on .    LIFESTYLE:  She engages in light physical activity by walking around her workplace building, though this is not a consistent routine.      ROS:  General: no fever, no chills, no fatigue, no weight gain, +weight loss  Eyes: no vision changes, no redness, no discharge  ENT: no ear pain, no nasal congestion, no sore throat  Cardiovascular: no chest pain, no palpitations, no lower extremity edema  Respiratory: no cough, no shortness of breath  Gastrointestinal: no abdominal pain, no nausea, no vomiting, no diarrhea, no constipation, no blood in stool  Genitourinary: no dysuria, no hematuria, no frequency  Musculoskeletal: no joint pain, no muscle pain  Skin: no rash, no lesion  Neurological: no headache, no dizziness, no numbness, no tingling  Psychiatric: no anxiety, no depression, no sleep difficulty           Objective     /82 (BP Location: Left arm, Patient Position: Sitting)   Pulse 70   Temp 98.4 °F (36.9 °C) (Oral)   Ht 5' 3" (1.6 m)   Wt 84.6 kg (186 lb 8.2 oz)   SpO2 96%   BMI 33.04 " kg/m²   Physical Exam  Constitutional:       Appearance: She is well-developed.   HENT:      Head: Normocephalic and atraumatic.   Eyes:      Conjunctiva/sclera: Conjunctivae normal.      Pupils: Pupils are equal, round, and reactive to light.   Cardiovascular:      Rate and Rhythm: Normal rate and regular rhythm.      Heart sounds: Normal heart sounds. No murmur heard.     No friction rub. No gallop.   Pulmonary:      Effort: No respiratory distress.      Breath sounds: Normal breath sounds.   Abdominal:      General: Bowel sounds are normal. There is no distension.      Palpations: Abdomen is soft.      Tenderness: There is no abdominal tenderness.   Musculoskeletal:         General: Normal range of motion.      Cervical back: Normal range of motion and neck supple.   Lymphadenopathy:      Cervical: No cervical adenopathy.   Skin:     General: Skin is warm.   Neurological:      Mental Status: She is alert and oriented to person, place, and time.           Assessment     Assessment & Plan     Assessed patient's diabetes management: A1C improved from 11 to 6.9   Evaluated cholesterol levels: Total cholesterol decreased from 250 to 150, LDL dropped from 163 to 85   Reviewed liver and kidney function: Both within normal limits   Will increase Ozempic dose from 0.25 to 0.5 mg for 1 month, then to 1.0 mg   Planned to potentially decrease Lantus dosage from 12 to 10, then to 8 units based on blood sugar response   Determined patient's weight loss and dietary changes have positively impacted overall health      FOLLOW-UP AND SCREENING:   Comprehensive labs ordered for 4 months (February/March).   Follow up in 4 months (February/March).         Problem List Items Addressed This Visit          Cardiac/Vascular    Essential hypertension  The current medical regimen is effective;  continue present plan and medications.       Hyperlipidemia   Explained importance of good fats in diet: recommended cooking with olive oil, eating  "fish, and avocados to improve HDL levels.   Explained benefits of exercise for cholesterol management and overall health.   Continued Crestor at current dose for cholesterol management.         Endocrine    Severe obesity (BMI 35.0-39.9) with comorbidity   Patient to continue current dietary habits and food tracking.   Recommend increasing daily exercise to at least 20 minutes.   Reviewed low impact cardio and walking exercises as home-based exercise options.        Type 2 diabetes mellitus without complication, without long-term current use of insulin - Primary    Relevant Medications    TRUEPLUS LANCETS 33 gauge Misc    TRUEPLUS LANCETS 33 gauge Misc    BD ECHO 2ND GEN PEN NEEDLE 32 gauge x 5/32" Ndle    semaglutide (OZEMPIC) 0.25 mg or 0.5 mg (2 mg/3 mL) pen injector    semaglutide (OZEMPIC) 1 mg/dose (4 mg/3 mL)   Discussed target blood sugar range: 120-140 max.   Patient to monitor blood sugar daily.   Increased Ozempic from 0.25 mg to 0.5 mg weekly for 1 month.   Continued Lantus insulin twice daily; may decrease dosage based on blood sugar response.   Continued Metformin at current dose.   Contact office when ready for next Ozempic dose increase (to 1.0 mg).      Other Relevant Orders    Comprehensive Metabolic Panel    Hemoglobin A1C         This note was generated with the assistance of ambient listening technology. Verbal consent was obtained by the patient and accompanying visitor(s) for the recording of patient appointment to facilitate this note. I attest to having reviewed and edited the generated note for accuracy, though some syntax or spelling errors may persist. Please contact the author of this note for any clarification.      "

## 2025-01-09 ENCOUNTER — OFFICE VISIT (OUTPATIENT)
Dept: FAMILY MEDICINE | Facility: CLINIC | Age: 70
End: 2025-01-09
Payer: COMMERCIAL

## 2025-01-09 VITALS
SYSTOLIC BLOOD PRESSURE: 124 MMHG | HEART RATE: 92 BPM | OXYGEN SATURATION: 97 % | BODY MASS INDEX: 33.61 KG/M2 | DIASTOLIC BLOOD PRESSURE: 72 MMHG | WEIGHT: 189.69 LBS | HEIGHT: 63 IN | TEMPERATURE: 98 F

## 2025-01-09 DIAGNOSIS — E66.01 SEVERE OBESITY (BMI 35.0-39.9) WITH COMORBIDITY: ICD-10-CM

## 2025-01-09 DIAGNOSIS — J06.9 UPPER RESPIRATORY TRACT INFECTION, UNSPECIFIED TYPE: Primary | ICD-10-CM

## 2025-01-09 DIAGNOSIS — S60.521A BLISTER OF RIGHT HAND, INITIAL ENCOUNTER: ICD-10-CM

## 2025-01-09 DIAGNOSIS — E11.9 TYPE 2 DIABETES MELLITUS WITHOUT COMPLICATION, WITHOUT LONG-TERM CURRENT USE OF INSULIN: ICD-10-CM

## 2025-01-09 DIAGNOSIS — Z02.89 ENCOUNTER FOR COMPLETION OF FORM WITH PATIENT: ICD-10-CM

## 2025-01-09 LAB
CTP QC/QA: YES
FLUAV AG NPH QL: NEGATIVE
FLUBV AG NPH QL: NEGATIVE
SARS-COV-2 RNA RESP QL NAA+PROBE: NOT DETECTED

## 2025-01-09 PROCEDURE — 87804 INFLUENZA ASSAY W/OPTIC: CPT | Mod: QW,S$GLB,,

## 2025-01-09 PROCEDURE — 87635 SARS-COV-2 COVID-19 AMP PRB: CPT

## 2025-01-09 PROCEDURE — 99999 PR PBB SHADOW E&M-EST. PATIENT-LVL V: CPT | Mod: PBBFAC,,,

## 2025-01-09 RX ORDER — DOXYCYCLINE 100 MG/1
100 CAPSULE ORAL EVERY 12 HOURS
Qty: 14 CAPSULE | Refills: 0 | Status: SHIPPED | OUTPATIENT
Start: 2025-01-09 | End: 2025-01-16

## 2025-01-09 RX ORDER — PROMETHAZINE HYDROCHLORIDE AND DEXTROMETHORPHAN HYDROBROMIDE 6.25; 15 MG/5ML; MG/5ML
5 SYRUP ORAL NIGHTLY PRN
Qty: 118 ML | Refills: 0 | Status: SHIPPED | OUTPATIENT
Start: 2025-01-09 | End: 2025-02-02

## 2025-01-09 RX ORDER — BENZONATATE 100 MG/1
100 CAPSULE ORAL 3 TIMES DAILY PRN
Qty: 30 CAPSULE | Refills: 0 | Status: SHIPPED | OUTPATIENT
Start: 2025-01-09 | End: 2025-01-19

## 2025-01-09 NOTE — PROGRESS NOTES
HPI     Keyona Herrera is a 69 y.o. female with multiple medical diagnoses as listed in the medical history and problem list that presents for URI and insect bite on hand. PCP Dr. Palomo with last visit in this clinic on 11/29/24.     Chief Complaint   Patient presents with    URI    Generalized Body Aches    Cough    Insect Bite     Right hand- possible bite    Advice Only     Handicap paper       HPI    History of Present Illness      CHIEF COMPLAINT:  Patient presents today with symptoms of a respiratory illness and flu-like symptoms.    RESPIRATORY ILLNESS:  She reports fatigue, body aches, and sneezing. Her cough is productive with phlegm varying between green and clear in color. She experiences headache and has had two episodes of vomiting with occasional loose bowel movements since illness onset. She denies chest pain and shortness of breath. Nighttime coughing has improved but persists.    DIABETES:  She reports elevated morning blood sugar since becoming ill, which she attributes to her cough medicine and the illness.    SKIN:  She reports a blister on the right hand that began as a small spot and has progressed to become swollen and painful.    CURRENT MEDICATIONS:  She is taking Coricidin, Robitussin DM for diabetics, and regular Tylenol for symptom management.    SOCIAL HISTORY:  She works at the  and is currently staying home to avoid spreading illness.    SURGICAL HISTORY:  She has a history of knee surgeries.         Assessment & Plan     1. Upper respiratory tract infection, unspecified type    Lungs CTA bilaterally. AFVSS in clinic today. Denies shortness of breath, chest pain.  -Mild symptoms, most likely viral etiology.  -based on clinical findings today, does not warrant Abx treatment at this time. D/w pt about the potential risks of inappropriate Abx use and that if patient's Sx worsens, we will re-evaluate to assess the need to change the treatment plan.  -Discussed OTC  remedies, hydration, rest. Follow up with clinic for any worsening symptoms, or if symptoms fail to improve.       - POCT Influenza A/B Rapid Antigen  - COVID-19 Routine Screening  - benzonatate (TESSALON) 100 MG capsule; Take 1 capsule (100 mg total) by mouth 3 (three) times daily as needed for Cough.  Dispense: 30 capsule; Refill: 0  - promethazine-dextromethorphan (PROMETHAZINE-DM) 6.25-15 mg/5 mL Syrp; Take 5 mLs by mouth nightly as needed (cough).  Dispense: 118 mL; Refill: 0    2. Blister of right hand, initial encounter (see picture below)    Tender to touch, erythemic and swollen. Has been there for about 3 weeks. Encouraged warms compresses to help swelling and to express any fluid underneath skin. Follow up with clinic for any worsening symptoms, or if symptoms fail to improve.       - doxycycline (VIBRAMYCIN) 100 MG Cap; Take 1 capsule (100 mg total) by mouth every 12 (twelve) hours. for 7 days  Dispense: 14 capsule; Refill: 0    3. Type 2 diabetes mellitus without complication, without long-term current use of insulin    Stable on Metformin, Lantus, and Ozempic. Slightly elevated readings this past week since cold symptoms started. Will likely resolve once she is feeling better/no longer taking cold medicine. Follow up with clinic for any worsening symptoms, or if symptoms fail to improve.       4. Severe obesity (BMI 35.0-39.9) with comorbidity    Stable, steadily losing weight on Ozempic weekly. The current medical regimen is effective;  continue present plan and medications.     5. Encounter for completion of form with patient    Patient requesting temporary handicap sticker due to difficulty walking long distances due to knee replacements. Form completed. Encouraged to keep walking longer distances as much as possible; exercise regimen and stretching encouraged.       Discussed DDx, condition, and treatment.   Education sent to patient portal/included in after visit summary.  ED precautions given.    Notify provider if symptoms do not resolve or increase in severity.   Patient verbalizes understanding and agrees with plan of care.  --------------------------------------------      Health Maintenance:  Health Maintenance         Date Due Completion Date    Hepatitis C Screening Never done ---    RSV Vaccine (Age 60+ and Pregnant patients) (1 - Risk 60-74 years 1-dose series) Never done ---    DEXA Scan 03/03/2024 3/3/2021    Influenza Vaccine (1) Never done ---    COVID-19 Vaccine (1 - 2024-25 season) Never done ---    Hemoglobin A1c 05/23/2025 11/23/2024    Eye Exam 07/15/2025 7/15/2024    Foot Exam 07/29/2025 7/29/2024 (Done)    Override on 7/29/2024: Done    Mammogram 08/12/2025 8/12/2024    Diabetes Urine Screening 11/23/2025 11/23/2024    Lipid Panel 11/23/2025 11/23/2024    Low Dose Statin 11/29/2025 11/29/2024    Colorectal Cancer Screening 11/14/2027 11/14/2022    TETANUS VACCINE 06/03/2032 6/3/2022            Discussed the importance of overdue vaccines which were offered during this encounter. Patient declined overdue vaccines at this time and Advised patient on the importance of completing overdue health maintenance items    Follow Up:  No follow-ups on file.    Exam     Review of Systems:  (as noted above)  Review of Systems   Constitutional:  Positive for fatigue. Negative for activity change, appetite change and fever.   HENT:  Positive for congestion, rhinorrhea and sneezing. Negative for sore throat and trouble swallowing.    Respiratory:  Positive for cough. Negative for shortness of breath.    Cardiovascular:  Negative for chest pain.   Gastrointestinal:  Positive for diarrhea. Negative for blood in stool and vomiting.   Genitourinary:  Negative for hematuria.   Musculoskeletal:  Positive for myalgias.   Skin:  Negative for rash.   Neurological:  Negative for dizziness and weakness.       Physical Exam:   Physical Exam  Constitutional:       General: She is not in acute distress.      "Appearance: Normal appearance. She is obese. She is ill-appearing (mildly).   HENT:      Head: Normocephalic and atraumatic.      Right Ear: Tympanic membrane normal.      Left Ear: Tympanic membrane normal.      Nose: Congestion and rhinorrhea present.      Mouth/Throat:      Mouth: Mucous membranes are moist.      Pharynx: Posterior oropharyngeal erythema present. No oropharyngeal exudate.   Eyes:      Pupils: Pupils are equal, round, and reactive to light.   Cardiovascular:      Rate and Rhythm: Normal rate and regular rhythm.      Pulses: Normal pulses.      Heart sounds: Normal heart sounds. No murmur heard.  Pulmonary:      Effort: Pulmonary effort is normal. No respiratory distress.      Breath sounds: Normal breath sounds. No wheezing.   Musculoskeletal:      Cervical back: Normal range of motion. No rigidity.   Lymphadenopathy:      Cervical: No cervical adenopathy.   Skin:     General: Skin is warm and dry.      Capillary Refill: Capillary refill takes less than 2 seconds.   Neurological:      General: No focal deficit present.      Mental Status: She is alert and oriented to person, place, and time.      Motor: No weakness.   Psychiatric:         Mood and Affect: Mood normal.         Behavior: Behavior normal.       Vitals:    25 0810   BP: 124/72   BP Location: Right arm   Patient Position: Sitting   Pulse: 92   Temp: 97.9 °F (36.6 °C)   TempSrc: Oral   SpO2: 97%   Weight: 86 kg (189 lb 11.3 oz)   Height: 5' 3" (1.6 m)      Body mass index is 33.6 kg/m².        History     Past Medical History:  Past Medical History:   Diagnosis Date    HTN (hypertension)     Hyperlipidemia     Obesity     Personal history of colonic polyps        Past Surgical History:  Past Surgical History:   Procedure Laterality Date     SECTION      2    CHOLECYSTECTOMY      COLONOSCOPY N/A 2016    Procedure: COLONOSCOPY;  Surgeon: Edy Chanel MD;  Location: [facility];  Service: Endoscopy;  Laterality: N/A;    " COLONOSCOPY N/A 11/14/2022    Procedure: COLONOSCOPY;  Surgeon: Judy Hernandez MD;  Location: [facility];  Service: Endoscopy;  Laterality: N/A;  case request converted to ambulatory referral     instr portal    HYSTERECTOMY      INCISIONAL BREAST BIOPSY      surgery on foot      TOTAL KNEE ARTHROPLASTY Right 3/4/2019    Procedure: REPLACEMENT-KNEE-TOTAL;  Surgeon: John L. Ochsner Jr., MD;  Location: [facility] OR [address] [place] [address];  Service: Orthopedics;  Laterality: Right;       Social History:  Social History     Socioeconomic History    Marital status:    Tobacco Use    Smoking status: Never    Smokeless tobacco: Never   Substance and Sexual Activity    Alcohol use: Yes     Comment: socail drinker/ not often    Drug use: No    Sexual activity: Yes     Partners: Male     Social Drivers of Health     Financial Resource Strain: Low Risk  (7/11/2024)    Overall Financial Resource Strain (CARDIA)     Difficulty of Paying Living Expenses: Not very hard   Food Insecurity: No Food Insecurity (7/11/2024)    Hunger Vital Sign     Worried About Running Out of Food in the Last Year: Never true     Ran Out of Food in the Last Year: Never true   Physical Activity: Unknown (7/11/2024)    Exercise Vital Sign     Days of Exercise per Week: 0 days   Stress: No Stress Concern Present (7/11/2024)    [employer] [place] [employer] - Occupational Stress Questionnaire     Feeling of Stress : Not at all   Housing Stability: Unknown (7/11/2024)    Housing Stability Vital Sign     Unable to Pay for Housing in the Last Year: No       Family History:  Family History   Problem Relation Name Age of Onset    Dementia Mother      Aneurysm Father          AAA    Hypertension Unknown      Cancer Maternal Grandfather          colon cancer - ? age of onset       Allergies and Medications: (updated and reviewed)  Review of patient's allergies indicates:  No Known Allergies  Current Outpatient Medications   Medication Sig Dispense Refill     "alcohol swabs (ALCOHOL PREP PADS) PadM Apply 1 each topically 2 (two) times a day. 100 each 11    ammonium lactate 12 % Crea Apply 1 application  topically once daily. 140 g 5    BD ECHO 2ND GEN PEN NEEDLE 32 gauge x 5/32" Ndle USE EVERY NIGHT AT BEDTIME      blood sugar diagnostic Strp TWICE DAILY BLOOD SUGAR TESTING; WHICHEVER BRAND COVERED BY INSURANCE 60 strip 11    ciclopirox (PENLAC) 8 % Soln Apply topically nightly. 6.6 mL 3    coenzyme Q10 100 mg capsule Take 100 mg by mouth once daily.      diclofenac sodium (VOLTAREN) 1 % Gel Apply 2 g topically 4 (four) times daily as needed (arthritis pain). 100 g 1    DM/acetaminophen/doxylamine (CORICIDIN HBP COLD-MULTI SYMPT ORAL) Take by mouth.      flaxseed oil 1,000 mg Cap Take by mouth. 1 Capsule Oral Every day      hydroCHLOROthiazide (HYDRODIURIL) 25 MG tablet TAKE 1 TABLET(25 MG) BY MOUTH EVERY DAY 90 tablet 3    insulin glargine U-100, Lantus, (LANTUS SOLOSTAR U-100 INSULIN) 100 unit/mL (3 mL) InPn pen Inject 12 Units into the skin every evening. 9 mL 2    lancets (LANCETS,THIN) Misc 1 lancet  by Misc.(Non-Drug; Combo Route) route 2 (two) times a day. 100 each 11    metFORMIN (GLUCOPHAGE) 500 MG tablet Take 1 tablet (500 mg total) by mouth 2 (two) times daily with meals. 180 tablet 0    multivitamin capsule Take by mouth. 1 Capsule Oral Every day      omega-3 fatty acids 1,000 mg Cap Take by mouth. 1 Capsule Oral Every day      pen needle, diabetic (MICRODOT INSULIN PEN NEEDLE) 33 gauge x 5/32" Ndle 1 Needle by Misc.(Non-Drug; Combo Route) route nightly. 100 each 11    rosuvastatin (CRESTOR) 10 MG tablet TAKE 1 TABLET(10 MG) BY MOUTH DAILY 90 tablet 0    semaglutide (OZEMPIC) 0.25 mg or 0.5 mg (2 mg/3 mL) pen injector Inject 0.5 mg into the skin every 7 days. 3 mL 0    semaglutide (OZEMPIC) 1 mg/dose (4 mg/3 mL) Inject 1 mg into the skin every 7 days. 3 mL 5    TRUE METRIX GLUCOSE METER Misc USE AS DIRECTED 1 each 0    TRUEPLUS LANCETS 33 gauge Misc 2 (two) " times daily.      TRUEPLUS LANCETS 33 gauge Misc Apply topically.      aspirin (ECOTRIN) 325 MG EC tablet Take 1 tablet (325 mg total) by mouth 2 (two) times daily. 60 tablet 0    benzonatate (TESSALON) 100 MG capsule Take 1 capsule (100 mg total) by mouth 3 (three) times daily as needed for Cough. 30 capsule 0    doxycycline (VIBRAMYCIN) 100 MG Cap Take 1 capsule (100 mg total) by mouth every 12 (twelve) hours. for 7 days 14 capsule 0    promethazine-dextromethorphan (PROMETHAZINE-DM) 6.25-15 mg/5 mL Syrp Take 5 mLs by mouth nightly as needed (cough). 118 mL 0     No current facility-administered medications for this visit.       Patient Care Team:  Cher Palomo MD as PCP - General (Family Medicine)  Yuko Diane MD as Obstetrician (Obstetrics and Gynecology)         - The patient is given an After Visit Summary that lists all medications with directions, allergies, education, orders placed during this encounter and follow-up instructions.      - I have reviewed the patient's medical information including past medical, family, and social history sections including the medications and allergies.      - We discussed the patient's current medications.     This note was created by combination of typed  and MModal dictation.  Transcription errors may be present.  If there are any questions, please contact me.     This note was generated with the assistance of ambient listening technology. Verbal consent was obtained by the patient and accompanying visitor(s) for the recording of patient appointment to facilitate this note. I attest to having reviewed and edited the generated note for accuracy, though some syntax or spelling errors may persist. Please contact the author of this note for any clarification.                [employer]

## 2025-01-09 NOTE — LETTER
I certify that (Name) Keyona Herrera meets the requirements as outlined in #  (shown on reverse side) and qualifies for a mobility impaired license plate/hang-tag. I further understand that willful and false certification shall subject me to fines/imprisonment as outlined in R.S. 47:463.4 (G) (4). The applicant's information is as follows:    YOB: 1955            Race:White        Gender:Female    Address:  [address] [place] [address]    City:[place]                               State:[place]     Zip Code:[address]     []Permanently Impaired - Applicant has a total or lifelong condition of mobility impairment from which little or no improvement or recovery can reasonably be expected. A medical examiners certification is required on initial application only.      [] Temporarily Impaired - Applicant has a temporary condition of mobility impairment of which improvement or recovery can reasonably be expected. Applicant is entitled to a hangtag, which will be valid for one (1) year. A medical examiners certification is required for the renewal of the hangtag      [] Unable to appear in person at the Office of Motor Vehicles - Applicant must bring facial photo        Medical Examiner's Signature________________________________________ Date:1/9/25_________________________    Printed Name:___________________________________________________    State License #_____________________________    Address: [address] [place] [address]___                                     Phone Number: [phone]                                                                                                                                                                                                                  [facility]__ State: [place] __Zip Code: [address] _______________    TO BE COMPLETED BY MOTOR VEHICLE ANALYST ONLY    [place]   Lic. Plate #      Hangtag Control #   Hangtag ID #      Date Issued:     #:   Office #:

## 2025-01-15 DIAGNOSIS — E11.9 TYPE 2 DIABETES MELLITUS WITHOUT COMPLICATION, WITHOUT LONG-TERM CURRENT USE OF INSULIN: ICD-10-CM

## 2025-01-16 RX ORDER — INSULIN GLARGINE 100 [IU]/ML
INJECTION, SOLUTION SUBCUTANEOUS
Qty: 9 ML | Refills: 2 | Status: SHIPPED | OUTPATIENT
Start: 2025-01-16

## 2025-01-16 NOTE — TELEPHONE ENCOUNTER
Refill Routing Note   Medication(s) are not appropriate for processing by [facility] for the following reason(s):        No active prescription written by provider    [facility] action(s):  Defer               Appointments  past 12m or future 3m with PCP    Date Provider   Last Visit   11/29/2024 Cher Palomo MD   Next Visit   3/11/2025 Cher Palomo MD   ED visits in past 90 days: 0        Note composed:6:34 PM 01/15/2025

## 2025-01-27 DIAGNOSIS — E78.5 DYSLIPIDEMIA: ICD-10-CM

## 2025-01-27 DIAGNOSIS — I10 ESSENTIAL HYPERTENSION: ICD-10-CM

## 2025-01-27 RX ORDER — HYDROCHLOROTHIAZIDE 25 MG/1
TABLET ORAL
Qty: 90 TABLET | Refills: 3 | Status: SHIPPED | OUTPATIENT
Start: 2025-01-27

## 2025-01-27 RX ORDER — ROSUVASTATIN CALCIUM 10 MG/1
10 TABLET, COATED ORAL
Qty: 90 TABLET | Refills: 3 | Status: SHIPPED | OUTPATIENT
Start: 2025-01-27

## 2025-01-27 NOTE — TELEPHONE ENCOUNTER
Refill Decision Note   Keyona Herrera  is requesting a refill authorization.  Brief Assessment and Rationale for Refill:  Approve     Medication Therapy Plan:        Comments:     Note composed:1:56 PM 01/27/2025

## 2025-01-27 NOTE — TELEPHONE ENCOUNTER
No care due was identified.  [facility] Embedded Care Due Messages. Reference number: [id number].   1/27/2025 5:40:40 AM CST

## 2025-01-27 NOTE — TELEPHONE ENCOUNTER
No care due was identified.  [facility] Embedded Care Due Messages. Reference number: [id number].   1/27/2025 5:40:09 AM CST

## 2025-01-27 NOTE — TELEPHONE ENCOUNTER
Refill Decision Note   Keyona Herrera  is requesting a refill authorization.  Brief Assessment and Rationale for Refill:  Approve     Medication Therapy Plan: PCP previously prescribed rx      Comments:     Note composed:1:57 PM 01/27/2025

## 2025-02-07 ENCOUNTER — OFFICE VISIT (OUTPATIENT)
Dept: DERMATOLOGY | Facility: CLINIC | Age: 70
End: 2025-02-07
Payer: COMMERCIAL

## 2025-02-07 DIAGNOSIS — L71.9 ROSACEA: Primary | ICD-10-CM

## 2025-02-07 DIAGNOSIS — B07.8 COMMON WART: ICD-10-CM

## 2025-02-07 PROCEDURE — 1160F RVW MEDS BY RX/DR IN RCRD: CPT | Mod: CPTII,S$GLB,, | Performed by: DERMATOLOGY

## 2025-02-07 PROCEDURE — 17110 DESTRUCTION B9 LES UP TO 14: CPT | Mod: S$GLB,,, | Performed by: DERMATOLOGY

## 2025-02-07 PROCEDURE — 1101F PT FALLS ASSESS-DOCD LE1/YR: CPT | Mod: CPTII,S$GLB,, | Performed by: DERMATOLOGY

## 2025-02-07 PROCEDURE — 1159F MED LIST DOCD IN RCRD: CPT | Mod: CPTII,S$GLB,, | Performed by: DERMATOLOGY

## 2025-02-07 PROCEDURE — 3288F FALL RISK ASSESSMENT DOCD: CPT | Mod: CPTII,S$GLB,, | Performed by: DERMATOLOGY

## 2025-02-07 PROCEDURE — 99214 OFFICE O/P EST MOD 30 MIN: CPT | Mod: 25,S$GLB,, | Performed by: DERMATOLOGY

## 2025-02-07 PROCEDURE — 1126F AMNT PAIN NOTED NONE PRSNT: CPT | Mod: CPTII,S$GLB,, | Performed by: DERMATOLOGY

## 2025-02-07 PROCEDURE — 99999 PR PBB SHADOW E&M-EST. PATIENT-LVL IV: CPT | Mod: PBBFAC,,, | Performed by: DERMATOLOGY

## 2025-02-07 RX ORDER — METRONIDAZOLE 7.5 MG/G
GEL TOPICAL
Qty: 45 G | Refills: 3 | Status: SHIPPED | OUTPATIENT
Start: 2025-02-07

## 2025-02-07 NOTE — PROGRESS NOTES
Subjective:      Patient ID:  Keyona Herrera is a 69 y.o. female who presents for   Chief Complaint   Patient presents with    Lesion     Inside right hand, raised, dark in color, x 1 month      Lesion on right hand for several weeks, sore to touch.  Also breakouts on face and some redness         Review of Systems   Constitutional:  Negative for fever, chills, weight loss, weight gain, fatigue, night sweats and malaise.   Skin:  Positive for daily sunscreen use and activity-related sunscreen use. Negative for wears hat.   Hematologic/Lymphatic: Does not bruise/bleed easily.       Objective:   Physical Exam   Constitutional: She appears well-developed and well-nourished.   Neurological: She is alert and oriented to person, place, and time.   Psychiatric: She has a normal mood and affect.   Skin:   Areas Examined (abnormalities noted in diagram):   Head / Face Inspection Performed  Nails and Digits Inspection Performed                Diagram Legend     Erythematous scaling macule/papule c/w actinic keratosis       Vascular papule c/w angioma      Pigmented verrucoid papule/plaque c/w seborrheic keratosis      Yellow umbilicated papule c/w sebaceous hyperplasia      Irregularly shaped tan macule c/w lentigo     1-2 mm smooth white papules consistent with Milia      Movable subcutaneous cyst with punctum c/w epidermal inclusion cyst      Subcutaneous movable cyst c/w pilar cyst      Firm pink to brown papule c/w dermatofibroma      Pedunculated fleshy papule(s) c/w skin tag(s)      Evenly pigmented macule c/w junctional nevus     Mildly variegated pigmented, slightly irregular-bordered macule c/w mildly atypical nevus      Flesh colored to evenly pigmented papule c/w intradermal nevus       Pink pearly papule/plaque c/w basal cell carcinoma      Erythematous hyperkeratotic cursted plaque c/w SCC      Surgical scar with no sign of skin cancer recurrence      Open and closed comedones      Inflammatory papules  and pustules      Verrucoid papule consistent consistent with wart     Erythematous eczematous patches and plaques     Dystrophic onycholytic nail with subungual debris c/w onychomycosis     Umbilicated papule    Erythematous-base heme-crusted tan verrucoid plaque consistent with inflamed seborrheic keratosis     Erythematous Silvery Scaling Plaque c/w Psoriasis     See annotation      Assessment / Plan:        Rosacea  -     metroNIDAZOLE (METROGEL) 0.75 % gel; Use hs on face  Dispense: 45 g; Refill: 3    Common wart  Cryosurgery procedure note:    Verbal consent from the patient is obtained including, but not limited to, risk of hypopigmentation/hyperpigmentation, scar, recurrence of lesion. Liquid nitrogen cryosurgery is applied to 1 lesion to produce a freeze injury is instructed to call if lesion does not completely resolve.    Tape with 2% sal acid cream hs (cerave supplied)      Call if not resolved

## 2025-02-25 ENCOUNTER — PATIENT MESSAGE (OUTPATIENT)
Dept: DERMATOLOGY | Facility: CLINIC | Age: 70
End: 2025-02-25
Payer: COMMERCIAL

## 2025-02-26 ENCOUNTER — TELEPHONE (OUTPATIENT)
Dept: DERMATOLOGY | Facility: CLINIC | Age: 70
End: 2025-02-26
Payer: COMMERCIAL

## 2025-02-26 ENCOUNTER — PATIENT MESSAGE (OUTPATIENT)
Dept: DERMATOLOGY | Facility: CLINIC | Age: 70
End: 2025-02-26
Payer: COMMERCIAL

## 2025-03-05 ENCOUNTER — LAB VISIT (OUTPATIENT)
Dept: LAB | Facility: HOSPITAL | Age: 70
End: 2025-03-05
Attending: FAMILY MEDICINE
Payer: COMMERCIAL

## 2025-03-05 DIAGNOSIS — E11.9 TYPE 2 DIABETES MELLITUS WITHOUT COMPLICATION, WITHOUT LONG-TERM CURRENT USE OF INSULIN: ICD-10-CM

## 2025-03-05 LAB
ALBUMIN SERPL BCP-MCNC: 3.8 G/DL (ref 3.5–5.2)
ALP SERPL-CCNC: 93 U/L (ref 40–150)
ALT SERPL W/O P-5'-P-CCNC: 19 U/L (ref 10–44)
ANION GAP SERPL CALC-SCNC: 9 MMOL/L (ref 8–16)
AST SERPL-CCNC: 30 U/L (ref 10–40)
BILIRUB SERPL-MCNC: 0.5 MG/DL (ref 0.1–1)
BUN SERPL-MCNC: 21 MG/DL (ref 8–23)
CALCIUM SERPL-MCNC: 9.7 MG/DL (ref 8.7–10.5)
CHLORIDE SERPL-SCNC: 101 MMOL/L (ref 95–110)
CO2 SERPL-SCNC: 27 MMOL/L (ref 23–29)
CREAT SERPL-MCNC: 0.7 MG/DL (ref 0.5–1.4)
EST. GFR  (NO RACE VARIABLE): >60 ML/MIN/1.73 M^2
ESTIMATED AVG GLUCOSE: 146 MG/DL (ref 68–131)
GLUCOSE SERPL-MCNC: 143 MG/DL (ref 70–110)
HBA1C MFR BLD: 6.7 % (ref 4–5.6)
POTASSIUM SERPL-SCNC: 4.6 MMOL/L (ref 3.5–5.1)
PROT SERPL-MCNC: 7.3 G/DL (ref 6–8.4)
SODIUM SERPL-SCNC: 137 MMOL/L (ref 136–145)

## 2025-03-05 PROCEDURE — 80053 COMPREHEN METABOLIC PANEL: CPT | Performed by: FAMILY MEDICINE

## 2025-03-05 PROCEDURE — 83036 HEMOGLOBIN GLYCOSYLATED A1C: CPT | Performed by: FAMILY MEDICINE

## 2025-03-05 PROCEDURE — 36415 COLL VENOUS BLD VENIPUNCTURE: CPT | Mod: PO | Performed by: FAMILY MEDICINE

## 2025-03-11 ENCOUNTER — OFFICE VISIT (OUTPATIENT)
Dept: FAMILY MEDICINE | Facility: CLINIC | Age: 70
End: 2025-03-11
Payer: COMMERCIAL

## 2025-03-11 VITALS
BODY MASS INDEX: 33.25 KG/M2 | HEIGHT: 63 IN | SYSTOLIC BLOOD PRESSURE: 130 MMHG | OXYGEN SATURATION: 97 % | HEART RATE: 76 BPM | DIASTOLIC BLOOD PRESSURE: 82 MMHG | WEIGHT: 187.63 LBS

## 2025-03-11 DIAGNOSIS — E66.01 SEVERE OBESITY (BMI 35.0-39.9) WITH COMORBIDITY: ICD-10-CM

## 2025-03-11 DIAGNOSIS — I10 ESSENTIAL HYPERTENSION: ICD-10-CM

## 2025-03-11 DIAGNOSIS — Z78.0 ASYMPTOMATIC MENOPAUSE: ICD-10-CM

## 2025-03-11 DIAGNOSIS — E11.9 TYPE 2 DIABETES MELLITUS WITHOUT COMPLICATION, WITHOUT LONG-TERM CURRENT USE OF INSULIN: ICD-10-CM

## 2025-03-11 DIAGNOSIS — Z00.00 ANNUAL PHYSICAL EXAM: Primary | ICD-10-CM

## 2025-03-11 DIAGNOSIS — E78.5 HYPERLIPIDEMIA, UNSPECIFIED HYPERLIPIDEMIA TYPE: ICD-10-CM

## 2025-03-11 PROCEDURE — 1101F PT FALLS ASSESS-DOCD LE1/YR: CPT | Mod: CPTII,S$GLB,, | Performed by: FAMILY MEDICINE

## 2025-03-11 PROCEDURE — 3288F FALL RISK ASSESSMENT DOCD: CPT | Mod: CPTII,S$GLB,, | Performed by: FAMILY MEDICINE

## 2025-03-11 PROCEDURE — 3044F HG A1C LEVEL LT 7.0%: CPT | Mod: CPTII,S$GLB,, | Performed by: FAMILY MEDICINE

## 2025-03-11 PROCEDURE — 99999 PR PBB SHADOW E&M-EST. PATIENT-LVL V: CPT | Mod: PBBFAC,,, | Performed by: FAMILY MEDICINE

## 2025-03-11 PROCEDURE — 1160F RVW MEDS BY RX/DR IN RCRD: CPT | Mod: CPTII,S$GLB,, | Performed by: FAMILY MEDICINE

## 2025-03-11 PROCEDURE — 1159F MED LIST DOCD IN RCRD: CPT | Mod: CPTII,S$GLB,, | Performed by: FAMILY MEDICINE

## 2025-03-11 PROCEDURE — 3079F DIAST BP 80-89 MM HG: CPT | Mod: CPTII,S$GLB,, | Performed by: FAMILY MEDICINE

## 2025-03-11 PROCEDURE — 3008F BODY MASS INDEX DOCD: CPT | Mod: CPTII,S$GLB,, | Performed by: FAMILY MEDICINE

## 2025-03-11 PROCEDURE — 3075F SYST BP GE 130 - 139MM HG: CPT | Mod: CPTII,S$GLB,, | Performed by: FAMILY MEDICINE

## 2025-03-11 PROCEDURE — 99397 PER PM REEVAL EST PAT 65+ YR: CPT | Mod: S$GLB,,, | Performed by: FAMILY MEDICINE

## 2025-03-11 NOTE — PROGRESS NOTES
"Routine Office Visit     Patient Name: Keyona Herrera    : 1955  MRN: [id number]    Subjective     History of Present Illness    CHIEF COMPLAINT:  Annual physical   Diabetes   Hypertension  / Hyperlipidemia - doing well on current regimen     DIABETES MANAGEMENT:  She reports her lowest recorded blood sugar reading of 88. She experienced unstable blood sugar for approximately two weeks during her sister-in-law's hospital stay, attributed to missed medications and disrupted routine. She continues Lantus 12 units and Ozempic.    GASTROINTESTINAL HISTORY:  Colonoscopy in  revealed few polyps. Due for repeat colonoscopy in .    FAMILY HISTORY:  Sister-in-law was recently diagnosed with colon cancer presenting initially with constipation. Sister-in-law underwent surgical removal of 10 cm of colon with temporary colostomy bag placement due to proximity to rectum, and will require chemotherapy.      ROS:  General: no fever, no chills, no fatigue, no weight gain, no weight loss  Eyes: no vision changes, no redness, no discharge  ENT: no ear pain, no nasal congestion, no sore throat  Cardiovascular: no chest pain, no palpitations, no lower extremity edema  Respiratory: no cough, no shortness of breath  Gastrointestinal: no abdominal pain, no nausea, no vomiting, no diarrhea, no constipation, no blood in stool  Genitourinary: no dysuria, no hematuria, no frequency  Musculoskeletal: no joint pain, no muscle pain  Skin: no rash, no lesion  Neurological: no headache, no dizziness, no numbness, no tingling  Psychiatric: no anxiety, no depression, no sleep difficulty           Objective     /82 (BP Location: Left arm, Patient Position: Sitting)   Pulse 76   Ht 5' 3" (1.6 m)   Wt 85.1 kg (187 lb 9.8 oz)   SpO2 97%   BMI 33.23 kg/m²   Physical Exam  Constitutional:       Appearance: She is well-developed.   HENT:      Head: Normocephalic and atraumatic.   Eyes:      Conjunctiva/sclera: Conjunctivae " normal.      Pupils: Pupils are equal, round, and reactive to light.   Cardiovascular:      Rate and Rhythm: Normal rate and regular rhythm.      Heart sounds: Normal heart sounds. No murmur heard.     No friction rub. No gallop.   Pulmonary:      Effort: No respiratory distress.      Breath sounds: Normal breath sounds.   Abdominal:      General: Bowel sounds are normal. There is no distension.      Palpations: Abdomen is soft.      Tenderness: There is no abdominal tenderness.   Musculoskeletal:         General: Normal range of motion.      Cervical back: Normal range of motion and neck supple.   Lymphadenopathy:      Cervical: No cervical adenopathy.   Skin:     General: Skin is warm.   Neurological:      Mental Status: She is alert and oriented to person, place, and time.           Assessment     Assessment & Plan    DIABETES:    LIVER AND KIDNEY FUNCTION:      COLON CANCER SCREENING:   Noted that the patient had a colonoscopy in 2022 which revealed a few polyps.   Scheduled the patient for a repeat colonoscopy in 2027.   Noted that sister-in-law was recently diagnosed with colon cancer, which runs in the family.   Emphasized the importance of the patient being aware of family history and getting regular check-ups.    BONE HEALTH:   Ordered bone scan (DEXA).    FOLLOW UP:   Ordered labs for 6-month follow-up.   Scheduled follow-up visit in 6 months.   Instructed the patient to resume regular medication routine after recent disruption due to family medical situation.   Advised the patient to contact the office if pharmacy needs to reach out regarding medication refills.         Problem List Items Addressed This Visit          Cardiac/Vascular    Essential hypertension - Primary    Relevant Orders    CBC Auto Differential    Comprehensive Metabolic Panel    Lipid Panel    Hemoglobin A1C    TSH    Hepatitis C Antibody  The current medical regimen is effective;  continue present plan and medications.        Hyperlipidemia   Confirmed liver and kidney function tests are within normal limits.  The current medical regimen is effective;  continue present plan and medications.          Endocrine    Severe obesity (BMI 35.0-39.9) with comorbidity    Type 2 diabetes mellitus without complication, without long-term current use of insulin    Relevant Orders    CBC Auto Differential    Comprehensive Metabolic Panel    Lipid Panel    Hemoglobin A1C    TSH    Hepatitis C Antibody   Evaluated the patient's labs, which indicates good control of diabetes with current regimen.   Noted improvement in A1C, suggesting potential need for insulin dose adjustment.   Educated the patient on the importance of monitoring blood sugar, especially before bedtime and in the morning.   Explained the relationship between A1C reduction, average blood sugar, and the effects of Ozempic.   Discussed the potential need for insulin dose adjustment as weight loss occurs and blood sugar control improves.   Instructed the patient to monitor blood sugar before bedtime and in the morning.   Advised the patient to be aware of potential hypoglycemia symptoms, especially at night.   Continued Ozempic prescription.   Continued Lantus 12 units, with instructions to potentially decrease dose if nighttime blood sugar drops.   Provided instructions for gradual dose reduction: decrease to 10 units, then 8 units, then 6 units as blood sugar improves.       Other Visit Diagnoses        Annual physical exam  I addressed all major concerns as it related to health maintenance.  All were ordered and scheduled based on the patients wishes.  Any additional health maintenance will be readdressed at the next physical if declined or deferred by the patient.      Asymptomatic menopause        Relevant Orders    DXA Bone Density Axial Skeleton 1 or more sites              This note was generated with the assistance of ambient listening technology. Verbal consent was obtained by the  patient and accompanying visitor(s) for the recording of patient appointment to facilitate this note. I attest to having reviewed and edited the generated note for accuracy, though some syntax or spelling errors may persist. Please contact the author of this note for any clarification.

## 2025-03-13 ENCOUNTER — RESULTS FOLLOW-UP (OUTPATIENT)
Dept: FAMILY MEDICINE | Facility: CLINIC | Age: 70
End: 2025-03-13

## 2025-04-05 DIAGNOSIS — E11.9 TYPE 2 DIABETES MELLITUS WITHOUT COMPLICATION, WITHOUT LONG-TERM CURRENT USE OF INSULIN: ICD-10-CM

## 2025-04-05 NOTE — TELEPHONE ENCOUNTER
No care due was identified.  [facility] Embedded Care Due Messages. Reference number: [id number].   4/05/2025 1:22:33 PM CDT

## 2025-04-06 RX ORDER — SEMAGLUTIDE 0.68 MG/ML
0.25 INJECTION, SOLUTION SUBCUTANEOUS
Qty: 3 ML | Refills: 0 | OUTPATIENT
Start: 2025-04-06 | End: 2025-06-01

## 2025-04-06 NOTE — TELEPHONE ENCOUNTER
Refill Decision Note   Keyona Herrera  is requesting a refill authorization.  Brief Assessment and Rationale for Refill:  Quick Discontinue     Medication Therapy Plan:  PT IS CURRENTLY ON 1 MMG OZEMPIC      Comments:     Note composed:1:49 PM 04/06/2025

## 2025-04-29 DIAGNOSIS — E11.9 TYPE 2 DIABETES MELLITUS WITHOUT COMPLICATION, WITHOUT LONG-TERM CURRENT USE OF INSULIN: ICD-10-CM

## 2025-04-29 RX ORDER — METFORMIN HYDROCHLORIDE 500 MG/1
500 TABLET ORAL 2 TIMES DAILY WITH MEALS
Qty: 180 TABLET | Refills: 1 | Status: SHIPPED | OUTPATIENT
Start: 2025-04-29

## 2025-04-29 NOTE — TELEPHONE ENCOUNTER
Refill Routing Note   Medication(s) are not appropriate for processing by [facility] for the following reason(s):        New or recently adjusted medication    [facility] action(s):  Defer             Appointments  past 12m or future 3m with PCP    Date Provider   Last Visit   3/11/2025 Cher Palomo MD   Next Visit   9/16/2025 Cher Palomo MD   ED visits in past 90 days: 0        Note composed:2:03 PM 04/29/2025                           Will identify 2 coping skills that assist in improving mood Will identify 2 coping skills that assist in improving mood Will identify 2 coping skills that assist in improving mood Will identify 2 coping skills that assist in improving mood Will identify 2 coping skills that assist in improving mood Will identify 2 coping skills that assist in improving mood Will identify 2 coping skills that assist in improving mood Will identify 2 coping skills that assist in improving mood Will identify 2 coping skills that assist in improving mood Will identify 2 coping skills that assist in improving mood Will identify 2 coping skills that assist in improving mood Will identify 2 coping skills that assist in improving mood Will identify 2 coping skills that assist in improving mood Will identify 2 coping skills that assist in improving mood Will identify 2 coping skills that assist in improving mood

## 2025-04-29 NOTE — TELEPHONE ENCOUNTER
No care due was identified.  [facility] Embedded Care Due Messages. Reference number: [id number].   4/29/2025 2:00:06 PM CDT

## 2025-06-26 DIAGNOSIS — E11.9 TYPE 2 DIABETES MELLITUS WITHOUT COMPLICATION, WITHOUT LONG-TERM CURRENT USE OF INSULIN: ICD-10-CM

## 2025-06-27 RX ORDER — CALCIUM CITRATE/VITAMIN D3 200MG-6.25
TABLET ORAL 2 TIMES DAILY
Qty: 200 STRIP | Refills: 3 | Status: SHIPPED | OUTPATIENT
Start: 2025-06-27

## 2025-06-27 NOTE — TELEPHONE ENCOUNTER
Refill Routing Note   Medication(s) are not appropriate for processing by [facility] for the following reason(s):        No active prescription written by provider    [facility] action(s):  Defer   Requires labs : Yes             Appointments  past 12m or future 3m with PCP    Date Provider   Last Visit   3/11/2025 Cher Palomo MD   Next Visit   9/16/2025 Cher Palomo MD   ED visits in past 90 days: 0        Note composed:12:24 AM 06/27/2025

## 2025-06-27 NOTE — TELEPHONE ENCOUNTER
Care Due:                  Date            Visit Type   Department     Provider  --------------------------------------------------------------------------------                                [facility]                              PRIMARY      / INTERNAL MED  Last Visit: 03-      CARE ([facility])   / RODGER Palomo                              [facility]                              PRIMARY      / INTERNAL MED  Next Visit: 09-      CARE ([facility])   / PEDRAQUEL Palomo                                                            Last  Test          Frequency    Reason                     Performed    Due Date  --------------------------------------------------------------------------------    HBA1C.......  6 months...  LANTUS, metFORMIN,         03- 09-                             semaglutide..............    [facility] Embedded Care Due Messages. Reference number: [id number].   6/27/2025 12:15:04 AM CDT

## 2025-07-23 DIAGNOSIS — E11.9 TYPE 2 DIABETES MELLITUS WITHOUT COMPLICATION, UNSPECIFIED WHETHER LONG TERM INSULIN USE: ICD-10-CM

## 2025-07-24 DIAGNOSIS — E11.9 TYPE 2 DIABETES MELLITUS WITHOUT COMPLICATION, WITHOUT LONG-TERM CURRENT USE OF INSULIN: ICD-10-CM

## 2025-07-25 RX ORDER — LANCETS 33 GAUGE
EACH MISCELLANEOUS 2 TIMES DAILY
Qty: 200 EACH | Refills: 3 | Status: SHIPPED | OUTPATIENT
Start: 2025-07-25

## 2025-07-25 NOTE — TELEPHONE ENCOUNTER
Refill Decision Note   Keyona Herrera  is requesting a refill authorization.  Brief Assessment and Rationale for Refill:  Approve     Medication Therapy Plan:       Medication Reconciliation Completed: No   Comments:     No Care Gaps recommended.     Note composed:11:44 AM 07/25/2025

## 2025-07-25 NOTE — TELEPHONE ENCOUNTER
No care due was identified.  [facility] Embedded Care Due Messages. Reference number: [id number].   7/25/2025 11:36:35 AM CDT

## 2025-07-28 ENCOUNTER — PATIENT MESSAGE (OUTPATIENT)
Dept: ADMINISTRATIVE | Facility: HOSPITAL | Age: 70
End: 2025-07-28
Payer: COMMERCIAL

## 2025-07-29 DIAGNOSIS — E11.9 TYPE 2 DIABETES MELLITUS WITHOUT COMPLICATION, WITHOUT LONG-TERM CURRENT USE OF INSULIN: ICD-10-CM

## 2025-07-29 NOTE — TELEPHONE ENCOUNTER
No care due was identified.  [facility] Embedded Care Due Messages. Reference number: [id number].   7/29/2025 5:46:30 PM CDT

## 2025-07-30 RX ORDER — PEN NEEDLE, DIABETIC 32GX 5/32"
NEEDLE, DISPOSABLE MISCELLANEOUS
Qty: 100 EACH | Refills: 3 | Status: SHIPPED | OUTPATIENT
Start: 2025-07-30

## 2025-07-30 NOTE — TELEPHONE ENCOUNTER
Refill Routing Note   Medication(s) are not appropriate for processing by [facility] for the following reason(s):        No active prescription written by provider    [facility] action(s):  Defer             Appointments  past 12m or future 3m with PCP    Date Provider   Last Visit   3/11/2025 Cher Palomo MD   Next Visit   9/16/2025 Cher Palomo MD   ED visits in past 90 days: 0        Note composed:11:41 PM 07/29/2025

## 2025-08-21 DIAGNOSIS — E11.9 TYPE 2 DIABETES MELLITUS WITHOUT COMPLICATION, WITHOUT LONG-TERM CURRENT USE OF INSULIN: ICD-10-CM

## 2025-08-21 RX ORDER — ISOPROPYL ALCOHOL 70 ML/100ML
1 SWAB TOPICAL 2 TIMES DAILY
Qty: 200 EACH | Refills: 3 | Status: SHIPPED | OUTPATIENT
Start: 2025-08-21

## 2025-08-27 DIAGNOSIS — Z12.31 OTHER SCREENING MAMMOGRAM: ICD-10-CM

## (undated) DEVICE — BIT DRILL PIN TROCAR 3.2MM
Type: IMPLANTABLE DEVICE | Site: KNEE | Status: NON-FUNCTIONAL
Removed: 2019-03-04

## (undated) DEVICE — MASK FLYTE HOOD PEEL AWAY

## (undated) DEVICE — SCREW HEX HEAD
Type: IMPLANTABLE DEVICE | Site: KNEE | Status: NON-FUNCTIONAL
Removed: 2019-03-04

## (undated) DEVICE — SEE MEDLINE ITEM 152487

## (undated) DEVICE — SEE MEDLINE ITEM 152529

## (undated) DEVICE — TOURNIQUET SB QC DP 34X4IN

## (undated) DEVICE — BLADE SAG 18.0X1.27X100

## (undated) DEVICE — DRESSING AQUACEL AG ADV 3.5X6

## (undated) DEVICE — DRESSING AQUACEL AG ADV 3.5X12

## (undated) DEVICE — PAD CAST SPECIALIST STRL 6

## (undated) DEVICE — SYR 30CC LUER LOCK

## (undated) DEVICE — KIT IRR SUCTION HND PIECE

## (undated) DEVICE — SOL IRR NACL .9% 3000ML

## (undated) DEVICE — PAD COLD THERAPY KNEE WRAP ON

## (undated) DEVICE — SET CEMENT (SCULP)

## (undated) DEVICE — PUMP COLD THERAPY

## (undated) DEVICE — ADHESIVE DERMABOND ADVANCED

## (undated) DEVICE — SEE MEDLINE ITEM 146271

## (undated) DEVICE — BANDAGE ESMARK 6X12

## (undated) DEVICE — KIT TOTAL KNEE TKOFG

## (undated) DEVICE — SEE MEDLINE ITEM 152515

## (undated) DEVICE — SUT VICRYL 3-0 27 SH

## (undated) DEVICE — TAPE SURG DURAPORE 2 X10YD

## (undated) DEVICE — BLADE RECIP RIBBED

## (undated) DEVICE — SUT MONOCRYL 3-0 SH U/D

## (undated) DEVICE — SEE MEDLINE ITEM 157117

## (undated) DEVICE — SEE MEDLINE ITEM 146298

## (undated) DEVICE — Device

## (undated) DEVICE — BOWL CEMENT

## (undated) DEVICE — DRESSING TRANS 4X4 TEGADERM

## (undated) DEVICE — DRAPE STERI INSTRUMENT 1018

## (undated) DEVICE — SEE MEDLINE ITEM 154981

## (undated) DEVICE — SUT VICRYL BR 1 GEN 27 CT-1

## (undated) DEVICE — BLADE SAG 13.0 X1.27X90

## (undated) DEVICE — BRUSH SURGICAL SCRUB STERILE

## (undated) DEVICE — ELECTRODE REM PLYHSV RETURN 9

## (undated) DEVICE — HOOD T-5 TEAR AWAY STERILE

## (undated) DEVICE — SYR ONLY LUER LOCK 20CC

## (undated) DEVICE — PAD KNEE POLAR XL

## (undated) DEVICE — NDL 18GA X1 1/2 REG BEVEL

## (undated) DEVICE — SCREW HEX HEAD 3.5X38MM
Type: IMPLANTABLE DEVICE | Site: KNEE | Status: NON-FUNCTIONAL
Removed: 2019-03-04

## (undated) DEVICE — SUT CTD VICRYL 0 UND BR CPX

## (undated) DEVICE — SEE MEDLINE ITEM 157131